# Patient Record
Sex: MALE | Race: WHITE
[De-identification: names, ages, dates, MRNs, and addresses within clinical notes are randomized per-mention and may not be internally consistent; named-entity substitution may affect disease eponyms.]

---

## 2021-12-04 ENCOUNTER — HOSPITAL ENCOUNTER (EMERGENCY)
Dept: HOSPITAL 97 - ER | Age: 31
Discharge: HOME | End: 2021-12-04
Payer: SELF-PAY

## 2021-12-04 VITALS — OXYGEN SATURATION: 100 % | TEMPERATURE: 97.9 F

## 2021-12-04 VITALS — SYSTOLIC BLOOD PRESSURE: 116 MMHG | DIASTOLIC BLOOD PRESSURE: 54 MMHG

## 2021-12-04 DIAGNOSIS — F17.210: ICD-10-CM

## 2021-12-04 DIAGNOSIS — R10.10: Primary | ICD-10-CM

## 2021-12-04 LAB
ALBUMIN SERPL BCP-MCNC: 3.3 G/DL (ref 3.4–5)
ALP SERPL-CCNC: 190 U/L (ref 45–117)
ALT SERPL W P-5'-P-CCNC: 154 U/L (ref 12–78)
AST SERPL W P-5'-P-CCNC: 71 U/L (ref 15–37)
BUN BLD-MCNC: 8 MG/DL (ref 7–18)
GLUCOSE SERPLBLD-MCNC: 101 MG/DL (ref 74–106)
HCT VFR BLD CALC: 43.5 % (ref 39.6–49)
LIPASE SERPL-CCNC: 74 U/L (ref 73–393)
LYMPHOCYTES # SPEC AUTO: 1 K/UL (ref 0.7–4.9)
PMV BLD: 7.9 FL (ref 7.6–11.3)
POTASSIUM SERPL-SCNC: 3.7 MMOL/L (ref 3.5–5.1)
RBC # BLD: 4.84 M/UL (ref 4.33–5.43)

## 2021-12-04 PROCEDURE — 80076 HEPATIC FUNCTION PANEL: CPT

## 2021-12-04 PROCEDURE — 83690 ASSAY OF LIPASE: CPT

## 2021-12-04 PROCEDURE — 96374 THER/PROPH/DIAG INJ IV PUSH: CPT

## 2021-12-04 PROCEDURE — 82565 ASSAY OF CREATININE: CPT

## 2021-12-04 PROCEDURE — 74177 CT ABD & PELVIS W/CONTRAST: CPT

## 2021-12-04 PROCEDURE — 36415 COLL VENOUS BLD VENIPUNCTURE: CPT

## 2021-12-04 PROCEDURE — 99284 EMERGENCY DEPT VISIT MOD MDM: CPT

## 2021-12-04 PROCEDURE — 96375 TX/PRO/DX INJ NEW DRUG ADDON: CPT

## 2021-12-04 PROCEDURE — 85025 COMPLETE CBC W/AUTO DIFF WBC: CPT

## 2021-12-04 PROCEDURE — 80048 BASIC METABOLIC PNL TOTAL CA: CPT

## 2021-12-04 NOTE — RAD REPORT
EXAM DESCRIPTION:  CT - Abdomen   Pelvis W Contrast - 12/4/2021 12:36 pm

 

CLINICAL HISTORY:  Abdominal pain

 

COMPARISON:  none.

 

TECHNIQUE:  Computed axial tomography of the abdomen pelvis was obtained. 100 cc Isovue-300 was admin
istered intravenously. Oral contrast was not requested which limits evaluation of bowel.

 

All CT scans are performed using dose optimization technique as appropriate and may include automated
 exposure control or mA/KV adjustment according to patient size.

 

FINDINGS:  Mild fatty infiltration of the liver

 

Spleen, pancreas, adrenal and kidneys appear unremarkable.

 

There is no evidence of diverticulitis. Normal appendix

 

7 millimeter sclerosis right femoral neck. 14 millimeter sclerosis left acetabulum.

 

Bladder is distended

 

IMPRESSION:  Sclerosis right femoral neck and left acetabulum is nonspecific. These may represent bon
e islands. Blastic metastases can also have this appearance. This should be correlated clinically. Fo
llowup x-ray in 3 months recommended to assess stability.

 

Bladder distention
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
## 2021-12-04 NOTE — EDPHYS
Physician Documentation                                                                           

 [facility]                                                                 

Name: Shawn Rocha                                                                               

Age: 31 yrs                                                                                       

Sex: Male                                                                                         

: 1990                                                                                   

MRN: [id number]                                                                                   

Arrival Date: 2021                                                                          

Time: 11:33                                                                                       

Account#: [id number]                                                                            

Bed 14                                                                                            

Private MD:                                                                                       

ED Physician Boo Salinas                                                                         

HPI:                                                                                              

                                                                                             

17:09 This 31 yrs old Male presents to ER via Ambulatory with complaints of Swollen Glands,   kb  

      Abdominal Pain.                                                                             

17:09 The patient presents with abdominal pain. Onset: The symptoms/episode began/occurred 2  kb  

      day(s) ago. The symptoms do not radiate. Associated signs and symptoms: none. The           

      symptoms are described as constant. Modifying factors: The symptoms are alleviated by       

      nothing, the symptoms are aggravated by pressure. Severity of pain: At its worst the        

      pain was moderate in the emergency department the pain is unchanged. The patient has        

      not experienced similar symptoms in the past. The patient has not recently seen a           

      physician. Pt reports LUQ pain that started 2 days ago. Concerned about abscess because     

      he had one on the right side that felt similar.                                             

                                                                                                  

Historical:                                                                                       

- Allergies:                                                                                      

11:43 No Known Allergies;                                                                     vg1 

- Home Meds:                                                                                      

11:43 None [Active];                                                                          vg1 

- PMHx:                                                                                           

11:43 Hep C;                                                                                  vg1 

- PSHx:                                                                                           

11:43 Abscess Drain from RUQ;                                                                 vg1 

                                                                                                  

- Immunization history:: Client reports receiving the 2nd dose of the Covid vaccine.              

- Social history:: Smoking status: Patient reports the use of cigarette tobacco                   

  products, smokes one-half pack cigarettes per day.                                              

                                                                                                  

                                                                                                  

ROS:                                                                                              

17:07 Constitutional: Negative for fever, chills, and weight loss.                            kb  

17:07 Abdomen/GI: Positive for abdominal pain, Negative for nausea, vomiting, and diarrhea.       

17:07 All other systems are negative.                                                             

                                                                                                  

Exam:                                                                                             

17:07 Constitutional:  This is a well developed, well nourished patient who is awake, alert,  kb  

      and in no acute distress. Head/Face:  Normocephalic, atraumatic. ENT:  Moist Mucous         

      membranes Cardiovascular:  Regular rate and rhythm with a normal S1 and S2.  No             

      gallops, murmurs, or rubs.  No pulse deficits. Respiratory:  Respirations even and          

      unlabored. No increased work of breathing, no retractions or nasal flaring. Skin:           

      Warm, dry with normal turgor.  Normal color. MS/ Extremity:  Pulses equal, no cyanosis.     

       Neurovascular intact.  Full, normal range of motion. Neuro:  Awake and alert, GCS 15,      

      oriented to person, place, time, and situation. Moves all extremities. Normal gait.         

      Psych:  Awake, alert, with orientation to person, place and time.  Behavior, mood, and      

      affect are within normal limits.                                                            

17:07 Abdomen/GI: Inspection: abdomen appears normal, Bowel sounds: normal, Palpation: soft,      

      in all quadrants, moderate abdominal tenderness, in the left upper quadrant.                

                                                                                                  

Vital Signs:                                                                                      

11:41  / 98; Pulse 89; Resp 16; Temp 97.9; Pulse Ox 100% ; Weight 62.6 kg; Height 5 ft. vg1 

      9 in. (175.26 cm); Pain 6/10;                                                               

12:43  / 54; Pulse 77; Resp 18; Pulse Ox 100% ; Pain 8/10;                              jh6 

11:41 Body Mass Index 20.38 (62.60 kg, 175.26 cm)                                             vg1 

                                                                                                  

MDM:                                                                                              

11:47 Patient medically screened.                                                             kb  

17:07 Data reviewed: vital signs, nurses notes. Data interpreted: Pulse oximetry: on room air kb  

      is 100 %. Interpretation: normal. Counseling: I had a detailed discussion with the          

      patient and/or guardian regarding: the historical points, exam findings, and any            

      diagnostic results supporting the discharge/admit diagnosis, lab results, radiology         

      results, the need for outpatient follow up, a family practitioner, to return to the         

      emergency department if symptoms worsen or persist or if there are any questions or         

      concerns that arise at home. ED course: Discussed incidental finding of sclerosis and       

      pt given copy of report. Pt educated to follow up with PCP for further evaluation.          

                                                                                                  

                                                                                             

12:00 Order name: Basic Metabolic Panel                                                       kb  

                                                                                             

12:00 Order name: CBC with Diff                                                               kb  

                                                                                             

12:00 Order name: Hepatic Function                                                            kb  

                                                                                             

12:00 Order name: Lipase                                                                      kb  

                                                                                             

12:00 Order name: Basic Metabolic Panel; Complete Time: 13:13                                 EDMS

                                                                                             

12:01 Order name: CBC with Automated Diff; Complete Time: 12:37                               EDMS

                                                                                             

12:00 Order name: IV Saline Lock; Complete Time: 12:23                                        kb  

                                                                                             

12:00 Order name: Labs collected and sent; Complete Time: 12:23                               kb  

                                                                                             

12:00 Order name: CT Abd/Pelvis - IV Contrast Only; Complete Time: 12:54                      kb  

                                                                                             

12:01 Order name: Liver (Hepatic) Function; Complete Time: 13:13                              EDMS

                                                                                             

12:01 Order name: Lipase; Complete Time: 13:13                                                EDMS

                                                                                                  

Administered Medications:                                                                         

13:50 CANCELLED (Patient Refused): morphine 4 mg IVP once; RASS on ADMIN: Combtv4, Very       kb  

      Agttd3, Agttd2, Rstlss1, AlertClm0, Drwsy-1, Lt Sdtn-2, Mod Sdtn-3, Dp Sdtn-4,              

      UnArsble-5                                                                                  

14:00 Drug: Zofran (Ondansetron) 4 mg Route: IVP; Site: right antecubital;                    [facility] 

14:10 Follow up: Response: No adverse reaction                                                [facility] 

14:00 Drug: Ketorolac 15 mg Route: IVP; Site: right antecubital;                              [facility] 

14:10 Follow up: Response: No adverse reaction                                                [facility] 

                                                                                                  

                                                                                                  

Disposition:                                                                                      

17:59 Co-signature as Attending Physician, Boo Salinas MD I agree with the assessment and     rn  

      plan of care. Attestation: The patient's history, exam findings, diagnostics, and a         

      summary of any interventions or procedures was reviewed in detail with Isabel MARTINEZ.                                                                                      

                                                                                                  

Disposition Summary:                                                                              

21 13:49                                                                                    

Discharge Ordered                                                                                 

      Location: Home                                                                          kb  

      Condition: Stable                                                                       kb  

      Diagnosis                                                                                   

        - Upper abdominal pain, unspecified                                                   kb  

      Followup:                                                                               kb  

        - With: Emergency Department                                                               

        - When: As needed                                                                          

        - Reason: Worsening of condition                                                           

      Followup:                                                                               kb  

        - With: Private Physician                                                                  

        - When: 2 - 3 days                                                                         

        - Reason: Recheck today's complaints, Continuance of care, Re-evaluation by your           

      physician                                                                                   

      Discharge Instructions:                                                                     

        - Discharge Summary Sheet                                                             kb  

        - Abdominal Pain, Adult, Easy-to-Read                                                 kb  

      Forms:                                                                                      

        - Medication Reconciliation Form                                                      kb  

        - Thank You Letter                                                                    kb  

        - Antibiotic Education                                                                kb  

        - Prescription Opioid Use                                                             kb  

      Prescriptions:                                                                              

        - Diclofenac Sodium 75 mg Oral tablet,delayed release (DR/EC)                              

            - take 1 tablet by ORAL route 2 times per day As needed; 30 tablet; Refills: 0,   kb  

      Product Selection Permitted                                                                 

Signatures:                                                                                       

Dispatcher [employer]                           Isabel Hernandez FNP-C FNP-Boo Hemphill MD MD rn Garcia, Victoria, RN RN   [facility]                                                  

Courtney Murrieta RN                   RN   jh6                                                  

                                                                                                  

Corrections: (The following items were deleted from the chart)                                    

13:50 13:33 morphine 4 mg IVP once; RASS on ADMIN: Combtv4, Very Agttd3, Agttd2, Rstlss1,     kb  

      AlertClm0, Drwsy-1, Lt Sdtn-2, Mod Sdtn-3, Dp Sdtn-4, UnArsble-5 ordered. kb                

                                                                                                  

**************************************************************************************************

## 2022-01-01 ENCOUNTER — HOSPITAL ENCOUNTER (EMERGENCY)
Dept: HOSPITAL 97 - ER | Age: 32
Discharge: LEFT BEFORE DISCHARGE | End: 2022-01-01
Payer: SELF-PAY

## 2022-01-01 DIAGNOSIS — Z02.89: Primary | ICD-10-CM

## 2022-01-01 NOTE — ER
Nurse's Notes                                                                                     

 [facility]                                                                 

Name: Shawn Rocha                                                                               

Age: 31 yrs                                                                                       

Sex: Male                                                                                         

: 1990                                                                                   

MRN: [id number]                                                                                   

Arrival Date: 2022                                                                          

Time: 03:05                                                                                       

Account#: [id number]                                                                            

Bed Waiting                                                                                       

Private MD:                                                                                       

Diagnosis:                                                                                        

                                                                                                  

ED Course:                                                                                        

                                                                                             

03:05 Patient arrived in ED.                                                                    

04:56 Patient's name was called from ER lobby. No response. Unable to locate patient. Will    bb  

      disposition as left without being seen by a provider.                                       

                                                                                                  

Administered Medications:                                                                         

No medications were administered                                                                  

                                                                                                  

                                                                                                  

Outcome:                                                                                          

04:56 Patient left the ED.                                                                    bb  

                                                                                                  

Signatures:                                                                                       

Kim Bajwa RN                     RN   bb                                                   

Saray Perez                                                                                    

                                                                                                  

**************************************************************************************************

## 2022-01-01 NOTE — XMS REPORT
Continuity of Care Document

                           Created on:2022



Patient:SUSY ALFARO

Sex:Male

:1990

External Reference #:[id number]





Demographics







                          Address                   [address]



                                                    [place] [address]

 

                          Home Phone                [phone]

 

                          Mobile Phone              [phone]

 

                          Email Address             [email or web address]

 

                          Preferred Language        en

 

                          Marital Status            Unknown

 

                          Druze Affiliation     Unknown

 

                          Race                      Unknown

 

                          Additional Race(s)        White

 

                          Ethnic Group              Not  or 









Author







                          Organization              [facility]

t

 

                          Address                   [address]



                                                    [place] [address]

 

                          Phone                     [phone]









Support







                Name            Relationship    Address         Phone

 

                Naveen           Sibling         Unavailable     [phone]









Care Team Providers







                    Name                Role                Phone

 

                    Pcp,  Does Not Have A Primary Care Physician [phone]

 

                    Therapy,  Covid Infusion Attending Clinician Unavailable

 

                    Stan ARNDT,  AYESHA        Attending Clinician [phone]

 

                    AYESHA PIERCE           Attending Clinician Unavailable









Problems

This patient has no known problems.



Allergies, Adverse Reactions, Alerts







       Allergy Allergy Status Severity Reaction(s) Onset  Inactive Treating Comm

ents 

Source



       Name   Type                        Date   Date   Clinician        

 

       NO KNOWN Drug   Active                                           [facility]



       ALLERGIE Class                                                   ity of



       S                                                              [facility]







Social History







           Social Habit Start Date Stop Date  Quantity   Comments   Source

 

           Sex Assigned At 1990                       [facility]



           Birth      00:00:00   00:00:00                         [facility]









                Smoking Status  Start Date      Stop Date       Source

 

                Unknown if ever smoked                                 [facility]







Medications







       Ordered Filled Start  Stop   Current Ordering Indication Dosage Frequency

 Signature

                    Comments            Components          Source



     Medication Medication Date Date Medication? Clinician                (SIG) 

          



     Name Name                                                   

 

     casirivimab      2021- No        [id number] 1200mg      1,200 mg,    

       [facility]



     -imdevimab                                Subcutaneo           it

y of



     (REGEN-COV      23:45: 22:33                          us, ONCE,           T

exas



     (EUA))      00   :00                           1 dose, On           Medical



     injection                                         [facility]



     (CO-FORMULA                                         21           



     TION) 1,200                                         at 1745,           



     mg                                           Routine           







Vital Signs







             Vital Name   Observation Time Observation Value Comments     Source

 

             Diastolic blood 2021 23:18:00 77 mm[Hg]                 Unive

rsity of



             pressure                                            [facility]

 

             Heart rate   2021 23:18:00 89 /min                   [facility]

 

             Body temperature 2021 23:18:00 37.22 Roxana                 [facility]

 

             Respiratory rate 2021 23:18:00 20 /min                   [facility]

 

             Oxygen saturation in 2021 23:18:00 99 /min                   

[facility]



             Arterial blood by                                        [facility]

ped[facility]



             Pulse oximetry                                        [place]

 

             Systolic blood 2021 23:18:00 131 mm[Hg]                Univer

sity of



             pressure                                            [facility]

 

             Body height  2021 22:30:00 170.2 cm                  [facility]

 

             Body weight  2021 22:30:00 61.689 kg                 [facility]

 

             BMI          2021 22:30:00 21.30 kg/m2               [facility]







Procedures

This patient has no known procedures.



Encounters







        Start   End     Encounter Admission Attending Care    Care    Encounter 

Source



        Date/Time Date/Time Type    Type    Clinicians Facility Department ID   

   

 

        2021 Nurse           Therapy, Adc Covid Infusion [facility]  

  [phone] 

[id number]                                [facility]



        16:00:00 17:00:00 Visit           Armand Pierce [id number]   

      cem [facility] [id number]         [facility]

s



                                                SURGICAL [id number]         Med

ical



                                                CENTER  [address]

 

        2021 Outpatient NORMA PIERCE  [facility]    [id number]

087 [facility]



        16:00:00 16:00:00                 ARMAND priest [facility]







Results

This patient has no known results.

## 2022-01-02 ENCOUNTER — HOSPITAL ENCOUNTER (EMERGENCY)
Dept: HOSPITAL 97 - ER | Age: 32
Discharge: HOME | End: 2022-01-02
Payer: SELF-PAY

## 2022-01-02 VITALS — DIASTOLIC BLOOD PRESSURE: 80 MMHG | TEMPERATURE: 98.3 F | OXYGEN SATURATION: 100 % | SYSTOLIC BLOOD PRESSURE: 138 MMHG

## 2022-01-02 DIAGNOSIS — R10.9: Primary | ICD-10-CM

## 2022-01-02 DIAGNOSIS — R59.0: ICD-10-CM

## 2022-01-02 LAB
ALBUMIN SERPL BCP-MCNC: 2.6 G/DL (ref 3.4–5)
ALP SERPL-CCNC: 415 U/L (ref 45–117)
ALT SERPL W P-5'-P-CCNC: 138 U/L (ref 12–78)
AST SERPL W P-5'-P-CCNC: 102 U/L (ref 15–37)
BUN BLD-MCNC: 11 MG/DL (ref 7–18)
GLUCOSE SERPLBLD-MCNC: 91 MG/DL (ref 74–106)
HCT VFR BLD CALC: 36.5 % (ref 39.6–49)
LIPASE SERPL-CCNC: 135 U/L (ref 73–393)
LYMPHOCYTES # SPEC AUTO: 0.7 K/UL (ref 0.7–4.9)
PMV BLD: 7.7 FL (ref 7.6–11.3)
POTASSIUM SERPL-SCNC: 3.8 MMOL/L (ref 3.5–5.1)
RBC # BLD: 4 M/UL (ref 4.33–5.43)

## 2022-01-02 PROCEDURE — 99284 EMERGENCY DEPT VISIT MOD MDM: CPT

## 2022-01-02 PROCEDURE — 70491 CT SOFT TISSUE NECK W/DYE: CPT

## 2022-01-02 PROCEDURE — 80048 BASIC METABOLIC PNL TOTAL CA: CPT

## 2022-01-02 PROCEDURE — 80076 HEPATIC FUNCTION PANEL: CPT

## 2022-01-02 PROCEDURE — 85025 COMPLETE CBC W/AUTO DIFF WBC: CPT

## 2022-01-02 PROCEDURE — 96375 TX/PRO/DX INJ NEW DRUG ADDON: CPT

## 2022-01-02 PROCEDURE — 36415 COLL VENOUS BLD VENIPUNCTURE: CPT

## 2022-01-02 PROCEDURE — 83690 ASSAY OF LIPASE: CPT

## 2022-01-02 PROCEDURE — 96374 THER/PROPH/DIAG INJ IV PUSH: CPT

## 2022-01-02 PROCEDURE — 74177 CT ABD & PELVIS W/CONTRAST: CPT

## 2022-01-02 NOTE — EDPHYS
Physician Documentation                                                                           

 [facility]                                                                 

Name: Shawn Rocha                                                                               

Age: 31 yrs                                                                                       

Sex: Male                                                                                         

: 1990                                                                                   

MRN: [id number]                                                                                   

Arrival Date: 2022                                                                          

Time: 12:15                                                                                       

Account#: [id number]                                                                            

Bed DIS4                                                                                          

Private MD:                                                                                       

ED Physician Jose Manuel Camacho                                                                       

HPI:                                                                                              

                                                                                             

13:15 This 31 yrs old Male presents to ER via Ambulatory with complaints of Throat Swelling.  jmm 

13:15 The patient presents with abdominal pain. Onset: The symptoms/episode began/occurred.   jmm 

      The symptoms do not radiate. Associated signs and symptoms: Pertinent positives:            

      nausea. The symptoms are described as achy. Modifying factors: The symptoms are             

      alleviated by nothing, the symptoms are aggravated by nothing. The patient has              

      experienced similar episodes in the past, chronically.                                      

                                                                                                  

Historical:                                                                                       

- Allergies:                                                                                      

12:50 No Known Allergies;                                                                     iw  

- Home Meds:                                                                                      

12:50 None [Active];                                                                          iw  

- PMHx:                                                                                           

12:50 HEP C;                                                                                  iw  

14:31 Drug abuse;                                                                             iw  

- PSHx:                                                                                           

12:50 Abscess Drain from RUQ;                                                                 iw  

                                                                                                  

- Immunization history:: Client reports receiving the 2nd dose of the Covid vaccine.              

- Social history:: Smoking status: Patient reports the use of cigarette tobacco                   

  products.                                                                                       

                                                                                                  

                                                                                                  

ROS:                                                                                              

13:15 Constitutional: Negative for fever, chills, and weight loss, Eyes: Negative for injury, jmm 

      pain, redness, and discharge, ENT: Negative for injury, pain, and discharge, Neck:          

      Negative for injury, pain, and swelling, Cardiovascular: Negative for chest pain,           

      palpitations, and edema, Respiratory: Negative for shortness of breath, cough,              

      wheezing, and pleuritic chest pain.                                                         

13:15 Skin: Negative for injury, rash, and discoloration, Neuro: Negative for headache,           

      weakness, numbness, tingling, and seizure, Psych: Negative for depression, anxiety,         

      suicide ideation, homicidal ideation, and hallucinations, Allergy/Immunology: Negative      

      for hives, rash, and allergies.                                                             

13:15 Abdomen/GI: Positive for abdominal pain, nausea.                                            

13:15 All other systems are negative.                                                             

                                                                                                  

Exam:                                                                                             

13:15 Constitutional:  This is a well developed, well nourished patient who is awake, alert,  jmm 

      and in no acute distress. Head/Face:  atraumatic. Eyes:  EOMI, no conjunctival erythema     

      appreciated ENT:  Moist Mucus Membranes Neck:  Trachea midline, Supple Chest/axilla:        

      Normal chest wall appearance and motion.   Cardiovascular:  Regular rate and rhythm.        

      No edema appreciated Respiratory:  Normal respirations, no respiratory distress             

      appreciated                                                                                 

13:15 Back:  Normal ROM Skin:  General appearance color normal MS/ Extremity:  Moves all          

      extremities, no obvious deformities appreciated, no edema noted to the lower                

      extremities  Neuro:  Awake and alert, normal gait Psych:  Behavior is normal, Mood is       

      normal, Patient is cooperative and pleasant                                                 

13:15 Abdomen/GI: Inspection: abdomen appears normal, Bowel sounds: normal, Palpation: soft,      

      mild abdominal tenderness, in all quadrants.                                                

                                                                                                  

Vital Signs:                                                                                      

12:48  / 80; Pulse 100; Resp 16; Temp 98.3; Pulse Ox 100% on R/A; Weight 62.14 kg;      iw  

      Height 5 ft. 8 in. (172.72 cm);                                                             

12:48 Body Mass Index 20.83 (62.14 kg, 172.72 cm)                                             iw  

                                                                                                  

MDM:                                                                                              

13:22 Patient medically screened.                                                             petr 

14:38 Data reviewed: vital signs, nurses notes. Counseling: I had a detailed discussion with  petr 

      the patient and/or guardian regarding: the historical points, exam findings, and any        

      diagnostic results supporting the discharge/admit diagnosis, lab results, radiology         

      results, the need for outpatient follow up, to return to the emergency department if        

      symptoms worsen or persist or if there are any questions or concerns that arise at          

      home. ED course: Is alert and nontoxic in appearance in the ED. Pain is decreased in        

      the ED. Patient advised to follow gastroenterology for further evaluation. Lymph nodes      

      could be secondary to immunocompromise state. Patient is otherwise given strict return      

      precautions. Patient understood and agrees plan of care..                                   

                                                                                                  

                                                                                             

13:15 Order name: Basic Metabolic Panel; Complete Time: 13:48                                 [facility] 

                                                                                             

13:15 Order name: CBC with Diff; Complete Time: 13:48                                         [facility] 

                                                                                             

13:15 Order name: Hepatic Function; Complete Time: 13:48                                      [facility] 

                                                                                             

13:15 Order name: Lipase; Complete Time: 13:48                                                [facility] 

                                                                                             

13:15 Order name: CT Soft Tissue Neck W/contr; Complete Time: 14:32                           [facility] 

                                                                                             

13:15 Order name: CT Abd/Pelvis - IV Contrast Only; Complete Time: 14:32                      [facility] 

                                                                                             

13:15 Order name: IV Saline Lock; Complete Time: 13:24                                        [facility] 

                                                                                             

13:15 Order name: Labs collected and sent; Complete Time: 13:24                               [facility] 

                                                                                                  

Administered Medications:                                                                         

14:26 Drug: morphine 4 mg Route: IVP; Site: left forearm;                                     iw  

14:50 Follow up: Response: No adverse reaction                                                iw  

14:26 Drug: Zofran (Ondansetron) 4 mg Route: IVP; Site: left forearm;                         iw  

14:50 Follow up: Response: No adverse reaction                                                iw  

                                                                                                  

                                                                                                  

Disposition:                                                                                      

18:53 Co-signature as Attending Physician, Jose Manuel Camacho MD I agree with the assessment and   kdr 

      plan of care.                                                                               

                                                                                                  

Disposition Summary:                                                                              

22 14:39                                                                                    

Discharge Ordered                                                                                 

      Location: Home                                                                          [facility] 

      Condition: Stable                                                                       [facility] 

      Diagnosis                                                                                   

        - Abdominal pain, unspecified                                                         [facility] 

        - Localized enlarged lymph nodes                                                      [facility] 

      Followup:                                                                               [facility] 

        - With: Lake Combs MD                                                                    

        - When: 2 - 3 days                                                                         

        - Reason: Recheck today's complaints, Continuance of care, Re-evaluation by your           

      physician                                                                                   

      Discharge Instructions:                                                                     

        - Discharge Summary Sheet                                                             [facility] 

        - Abdominal Pain, Adult                                                               jm 

        - Lymphadenopathy                                                                     [facility] 

      Forms:                                                                                      

        - Medication Reconciliation Form                                                      [facility] 

        - Thank You Letter                                                                    [facility] 

        - Antibiotic Education                                                                [facility] 

        - Prescription Opioid Use                                                             [facility] 

      Prescriptions:                                                                              

        - ondansetron 4 mg Oral tablet,disintegrating                                              

            - place 1 tablet by TRANSLINGUAL route every 4-6 hours; 20 tablet; Refills: 0,    [facility] 

      Product Selection Permitted                                                                 

        - Pepcid 20 mg Oral Tablet                                                                 

            - take 1 tablet by ORAL route every 12 hours for 10 days; 20 tablet; Refills: 0,  [facility] 

      Product Selection Permitted                                                                 

        - Augmentin 875-125 mg Oral Tablet                                                         

            - take 1 tablet by ORAL route every 12 hours for 10 days; 20 tablet; Refills: 0,  [facility] 

      Product Selection Permitted                                                                 

        - dicyclomine 20 mg Oral Tablet                                                            

            - take 1 tablet by ORAL route 4 times per day; 30 tablet; Refills: 0, Product     [facility] 

      Selection Permitted                                                                         

Signatures:                                                                                       

Dispatcher [employer]                           Jose Manuel Stafford MD MD kdr Mickail, Joel, PA PA   [facility]                                                  

Alysha Patterson RN                     RN   iw                                                   

                                                                                                  

**************************************************************************************************

## 2022-01-02 NOTE — RAD REPORT
EXAM DESCRIPTION:  CT - Soft Tissue Neck W/Contr

 

CLINICAL HISTORY:  thorat swelling

 

COMPARISON:  No comparisonsAbdomen   Pelvis W Contrast dated 1/2/2022

 

TECHNIQUE  All CT scans are performed using dose optimization technique as appropriate and may includ
e automated exposure control or mA/KV adjustment according to patient size.

 

FINDINGS:  Bilateral enlarged submental lymph nodes. Enlarged cervical chain lymph nodes noted as wel
l. Example, there is a left level 2 cervical chain lymph node measuring 14 millimeters. A left submen
harmeet lymph node measures 15 millimeters. A right lower cervical chain/supraclavicular lymph node measu
res 15 millimeters.

 

Nasopharyngeal tissues are normal in appearance. Fossa Rosenmller are normal.

 

Parapharyngeal fat triangles are symmetric. Tongue base structures are normal.

 

Epiglottis and aryepiglottic folds are normal. Piriform sinuses are well aerated.

 

The vocal cords are normal in appearance.

 

Salivary glands are normal in appearance.

 

Upper lung fields are clear. Included intracranial contents are unremarkable.

 

 

IMPRESSION:  Bilateral cervical chain lymphadenopathy which may refer either infection or inflammatio
n versus a lymphoproliferative process.

## 2022-01-02 NOTE — ER
Nurse's Notes                                                                                     

 [facility]                                                                 

Name: Shawn Rocha                                                                               

Age: 31 yrs                                                                                       

Sex: Male                                                                                         

: 1990                                                                                   

MRN: [id number]                                                                                   

Arrival Date: 2022                                                                          

Time: 12:15                                                                                       

Account#: [id number]                                                                            

Bed DIS4                                                                                          

Private MD:                                                                                       

Diagnosis: Abdominal pain, unspecified;Localized enlarged lymph nodes                             

                                                                                                  

Presentation:                                                                                     

                                                                                             

12:48 Chief complaint: Patient states: swelling to left side of neck started a week ago, is   iw  

      having chills, pain with swallowing, getting bigger , has been septic in past from and      

      abscess under his ribs. Coronavirus screen: At this time, the client does not indicate      

      any symptoms associated with coronavirus-19. Ebola Screen: Patient negative for fever       

      greater than or equal to 101.5 degrees Fahrenheit, and additional compatible Ebola          

      Virus Disease symptoms Patient denies exposure to infectious person. Patient denies         

      travel to an Ebola-affected area in the 21 days before illness onset. No symptoms or        

      risks identified at this time. Initial Sepsis Screen: Does the patient meet any 2           

      criteria? HR > 90 bpm. Does the patient have a suspected source of infection?. Risk         

      Assessment: Do you want to hurt yourself or someone else? Patient reports no desire to      

      harm self or others. Onset of symptoms was 2021.                               

12:48 Method Of Arrival: Ambulatory                                                           iw  

12:48 Acuity: TODD 3                                                                           iw  

                                                                                                  

Historical:                                                                                       

- Allergies:                                                                                      

12:50 No Known Allergies;                                                                     iw  

- Home Meds:                                                                                      

12:50 None [Active];                                                                          iw  

- PMHx:                                                                                           

12:50 HEP C;                                                                                  iw  

14:31 Drug abuse;                                                                             iw  

- PSHx:                                                                                           

12:50 Abscess Drain from RUQ;                                                                 iw  

                                                                                                  

- Immunization history:: Client reports receiving the 2nd dose of the Covid vaccine.              

- Social history:: Smoking status: Patient reports the use of cigarette tobacco                   

  products.                                                                                       

                                                                                                  

                                                                                                  

Screenin:42 Abuse screen: Denies threats or abuse. Denies injuries from another. Nutritional        iw  

      screening: No deficits noted. Tuberculosis screening: No symptoms or risk factors           

      identified. Fall Risk IV access (20 points).                                                

                                                                                                  

Assessment:                                                                                       

13:41 General: Appears in no apparent distress. Behavior is calm, cooperative. Pain:          iw  

      Complains of pain in neck. Neuro: Level of Consciousness is awake, alert, obeys             

      commands, Oriented to person, place, time, situation, Moves all extremities. Full           

      function. Cardiovascular: Patient's skin is warm and dry. Respiratory: Respiratory          

      effort is even, unlabored, Respiratory pattern is regular, symmetrical. Derm: Skin is       

      intact, is healthy with good turgor.                                                        

                                                                                                  

Vital Signs:                                                                                      

12:48  / 80; Pulse 100; Resp 16; Temp 98.3; Pulse Ox 100% on R/A; Weight 62.14 kg;      iw  

      Height 5 ft. 8 in. (172.72 cm);                                                             

12:48 Body Mass Index 20.83 (62.14 kg, 172.72 cm)                                             iw  

                                                                                                  

ED Course:                                                                                        

12:15 Patient arrived in ED.                                                                  mr  

12:28 Francisco He PA is PHCP.                                                              [facility] 

12:28 Jose Manuel Camacho MD is Attending Physician.                                              [facility] 

12:50 Triage completed.                                                                       iw  

12:50 Arm band placed on.                                                                     iw  

13:25 Initial lab(s) drawn, by me, sent to lab. Inserted saline lock: 20 gauge in left        kj1 

      forearm, using aseptic technique. Blood collected.                                          

13:41 Alysha Patterson, RN is Primary Nurse.                                                   iw  

13:41 Patient has correct armband on for positive identification.                             iw  

14:12 CT Soft Tissue Neck W/contr In Process Unspecified.                                     EDMS

14:12 CT Abd/Pelvis - IV Contrast Only In Process Unspecified.                                EDMS

14:38 Lake Combs MD is Referral Physician.                                                  [facility] 

15:08 No provider procedures requiring assistance completed. Patient did not have IV access   iw  

      during this emergency room visit.                                                           

                                                                                                  

Administered Medications:                                                                         

14:26 Drug: morphine 4 mg Route: IVP; Site: left forearm;                                     iw  

14:50 Follow up: Response: No adverse reaction                                                iw  

14:26 Drug: Zofran (Ondansetron) 4 mg Route: IVP; Site: left forearm;                         iw  

14:50 Follow up: Response: No adverse reaction                                                iw  

                                                                                                  

                                                                                                  

Outcome:                                                                                          

14:39 Discharge ordered by MD.                                                                jmm 

15:08 Discharged to home ambulatory, with family.                                             iw  

15:08 Condition: good                                                                             

15:08 Discharge instructions given to patient, Instructed on discharge instructions, follow       

      up and referral plans. Demonstrated understanding of instructions, follow-up care.          

15:09 Patient left the ED.                                                                    iw  

                                                                                                  

Signatures:                                                                                       

Dispatcher [employer]                           EDMS                                                 

Francisco He PA                       PA   Alia Siu                                 mr                                                   

Alysha Patterson, VALERIA                     RN   iw                                                   

Ema Murillo                              kj1                                                  

                                                                                                  

**************************************************************************************************

## 2022-01-02 NOTE — RAD REPORT
EXAM DESCRIPTION:  CTAbdomen   Pelvis W Contrast - 1/2/2022 2:12 pm

 

CLINICAL HISTORY:

abdominal pain

 

COMPARISON:  Abdomen   Pelvis W Contrast dated 12/4/2021

 

TECHNIQUE:  CT of the abdomen and pelvis was performed.

 

All CT scans are performed using dose optimization technique as appropriate and may include automated
 exposure control or mA/KV adjustment according to patient size.

 

FINDINGS:  Lower chest: Small lower lung pulmonary nodules, the largest measuring 4 millimeters in th
e medial aspect of the right lower lobe. These are most certainly benign.

Liver: No acute abnormality or suspicious lesions.

Biliary: No biliary ductal dilatation.

Stomach: No significant focal abnormality.

Duodenum: No significant focal abnormality.

Pancreas: No significant abnormality.

Spleen: Borderline splenomegaly.

Adrenal: No suspicious lesions.

Kidney/ureter: No hydronephrosis. No renal calculi.

Retroperitoneum: No retroperitoneal adenopathy.

Vascular: No aneurysm.

Bowel: No significant focal abnormality. Normal appendix.

Peritoneum: No ascites or free air.

Bladder: Grossly unremarkable.

Reproductive: No adnexal masses.

Bones: No acute fracture.

Other: n/a

 

IMPRESSION:  No acute intra-abdominal or pelvic finding. Normal appendix.

## 2022-01-03 ENCOUNTER — HOSPITAL ENCOUNTER (EMERGENCY)
Dept: HOSPITAL 97 - ER | Age: 32
Discharge: HOME | End: 2022-01-03
Payer: SELF-PAY

## 2022-01-03 VITALS — OXYGEN SATURATION: 100 % | DIASTOLIC BLOOD PRESSURE: 59 MMHG | SYSTOLIC BLOOD PRESSURE: 109 MMHG

## 2022-01-03 VITALS — TEMPERATURE: 99.3 F

## 2022-01-03 DIAGNOSIS — R07.9: Primary | ICD-10-CM

## 2022-01-03 DIAGNOSIS — F17.210: ICD-10-CM

## 2022-01-03 LAB
ALBUMIN SERPL BCP-MCNC: 2.4 G/DL (ref 3.4–5)
ALP SERPL-CCNC: 446 U/L (ref 45–117)
ALT SERPL W P-5'-P-CCNC: 115 U/L (ref 12–78)
AST SERPL W P-5'-P-CCNC: 89 U/L (ref 15–37)
BUN BLD-MCNC: 11 MG/DL (ref 7–18)
GLUCOSE SERPLBLD-MCNC: 142 MG/DL (ref 74–106)
HCT VFR BLD CALC: 33.8 % (ref 39.6–49)
INR BLD: 1.19
LYMPHOCYTES # SPEC AUTO: 0.4 K/UL (ref 0.7–4.9)
MAGNESIUM SERPL-MCNC: 2 MG/DL (ref 1.8–2.4)
NT-PROBNP SERPL-MCNC: 190 PG/ML (ref ?–125)
PMV BLD: 8.1 FL (ref 7.6–11.3)
POTASSIUM SERPL-SCNC: 3.9 MMOL/L (ref 3.5–5.1)
RBC # BLD: 3.71 M/UL (ref 4.33–5.43)
TROPONIN (EMERG DEPT USE ONLY): < 0.02 NG/ML (ref 0–0.04)

## 2022-01-03 PROCEDURE — 96374 THER/PROPH/DIAG INJ IV PUSH: CPT

## 2022-01-03 PROCEDURE — 80076 HEPATIC FUNCTION PANEL: CPT

## 2022-01-03 PROCEDURE — 85610 PROTHROMBIN TIME: CPT

## 2022-01-03 PROCEDURE — 96361 HYDRATE IV INFUSION ADD-ON: CPT

## 2022-01-03 PROCEDURE — 84484 ASSAY OF TROPONIN QUANT: CPT

## 2022-01-03 PROCEDURE — 83735 ASSAY OF MAGNESIUM: CPT

## 2022-01-03 PROCEDURE — 99284 EMERGENCY DEPT VISIT MOD MDM: CPT

## 2022-01-03 PROCEDURE — 83880 ASSAY OF NATRIURETIC PEPTIDE: CPT

## 2022-01-03 PROCEDURE — 85025 COMPLETE CBC W/AUTO DIFF WBC: CPT

## 2022-01-03 PROCEDURE — 80048 BASIC METABOLIC PNL TOTAL CA: CPT

## 2022-01-03 PROCEDURE — 86308 HETEROPHILE ANTIBODY SCREEN: CPT

## 2022-01-03 PROCEDURE — 71045 X-RAY EXAM CHEST 1 VIEW: CPT

## 2022-01-03 PROCEDURE — 36415 COLL VENOUS BLD VENIPUNCTURE: CPT

## 2022-01-03 PROCEDURE — 93005 ELECTROCARDIOGRAM TRACING: CPT

## 2022-01-03 NOTE — XMS REPORT
Continuity of Care Document

                           Created on:January 3, 2022



Patient:SUSY ALFARO

Sex:Male

:1990

External Reference #:[id number]





Demographics







                          Address                   [address]



                                                    [place] [address]

 

                          Home Phone                [phone]

 

                          Mobile Phone              [phone]

 

                          Email Address             [email or web address]

 

                          Preferred Language        en

 

                          Marital Status            Unknown

 

                          Yazidi Affiliation     Unknown

 

                          Race                      Unknown

 

                          Additional Race(s)        White

 

                          Ethnic Group              Not  or 









Author







                          Organization              [facility]

t

 

                          Address                   [address]



                                                    [place] [address]

 

                          Phone                     [phone]









Support







                Name            Relationship    Address         Phone

 

                Naveen           Sibling         Unavailable     [phone]









Care Team Providers







                    Name                Role                Phone

 

                    Pcp,  Does Not Have A Primary Care Physician [phone]

 

                    Therapy,  Covid Infusion Attending Clinician Unavailable

 

                    Stan ARNDT,  AYESHA        Attending Clinician [phone]

 

                    AYESHA PIERCE           Attending Clinician Unavailable









Problems

This patient has no known problems.



Allergies, Adverse Reactions, Alerts







       Allergy Allergy Status Severity Reaction(s) Onset  Inactive Treating Comm

ents 

Source



       Name   Type                        Date   Date   Clinician        

 

       NO KNOWN Drug   Active                                           [facility]



       ALLERGIE Class                                                   ity of



       S                                                              [facility]







Social History







           Social Habit Start Date Stop Date  Quantity   Comments   Source

 

           Sex Assigned At 1990                       [facility]



           Birth      00:00:00   00:00:00                         [facility]









                Smoking Status  Start Date      Stop Date       Source

 

                Unknown if ever smoked                                 [facility]







Medications







       Ordered Filled Start  Stop   Current Ordering Indication Dosage Frequency

 Signature

                    Comments            Components          Source



     Medication Medication Date Date Medication? Clinician                (SIG) 

          



     Name Name                                                   

 

     casirivimab      2021- No        [id number] 1200mg      1,200 mg,    

       [facility]



     -imdevimab                                Subcutaneo           it

y of



     (REGEN-COV      23:45: 22:33                          us, ONCE,           T

exas



     (EUA))      00   :00                           1 dose, On           Medical



     injection                                         [facility]



     (CO-FORMULA                                         21           



     TION) 1,200                                         at 1745,           



     mg                                           Routine           







Vital Signs







             Vital Name   Observation Time Observation Value Comments     Source

 

             Diastolic blood 2021 23:18:00 77 mm[Hg]                 Un[facility]

rsity of



             pressure                                            [facility]

 

             Heart rate   2021 23:18:00 89 /min                   [facility]

 

             Body temperature 2021 23:18:00 37.22 Roxana                 [facility]

 

             Respiratory rate 2021 23:18:00 20 /min                   [facility]

 

             Oxygen saturation in 2021 23:18:00 99 /min                   

[facility]



             Arterial blood by                                        [facility]

[facility]



             Pulse oximetry                                        [place]

 

             Systolic blood 2021 23:18:00 131 mm[Hg]                Univer

sity of



             pressure                                            [facility]

 

             Body height  2021 22:30:00 170.2 cm                  [facility]

 

             Body weight  2021 22:30:00 61.689 kg                 [facility]

 

             BMI          2021 22:30:00 21.30 kg/m2               [facility]







Procedures

This patient has no known procedures.



Encounters







        Start   End     Encounter Admission Attending Care    Care    Encounter 

Source



        Date/Time Date/Time Type    Type    Clinicians Facility Department ID   

   

 

        2021 Nurse           Therapy, [facility] Cov[facility] Infusion [facility]  

  [phone] 

[id number]                                [facility]



        16:00:00 17:00:00 Visit           Armand Pierce [id number]   

      cem [facility] [id number]         [facility]

s



                                                SURGICAL [phone]         Med

ical



                                                CENTER  [address]             [address]

 

        2021 Outpatient NORMA PIERCE  [facility]    [id number]

087 [facility]



        16:00:00 16:00:00                 ARMAND priest [facility]







Results

This patient has no known results.

## 2022-01-03 NOTE — EDPHYS
Physician Documentation                                                                           

 [facility]                                                                 

Name: Shawn Rocha                                                                               

Age: 31 yrs                                                                                       

Sex: Male                                                                                         

: 1990                                                                                   

MRN: [id number]                                                                                   

Arrival Date: 2022                                                                          

Time: 15:35                                                                                       

Account#: [id number]                                                                            

Bed 10                                                                                            

Private MD:                                                                                       

ED Physician Sunil Peng                                                                      

HPI:                                                                                              

                                                                                             

17:19 This 31 yrs old Male presents to ER via Ambulatory with complaints of Abdominal Pain.   pm1 

17:19 The patient presents with abdominal pain in the upper abdomen. Onset: The               pm1 

      symptoms/episode began/occurred 3 day(s) ago. The symptoms do not radiate. Associated       

      signs and symptoms: Pertinent positives: chest pain, Pertinent negatives: nausea,           

      vomiting, and diarrhea, dysuria, fever, shortness of breath. The symptoms are described     

      as sharp. Modifying factors: The symptoms are alleviated by nothing, the symptoms are       

      aggravated by nothing. Severity of pain: in the emergency department the pain is            

      actually worse. The patient has experienced similar episodes in the past, a few times.      

      The patient has been recently seen at the [facility] Emergency       

      Department, yesterday, for similar complaints labs were performed, CT scan was              

      performed, was given a prescription for antibiotics, was given a prescription for pain      

      medications.                                                                                

                                                                                                  

Historical:                                                                                       

- Allergies:                                                                                      

15:59 No Known Allergies;                                                                     iw  

- Home Meds:                                                                                      

15:59 None [Active];                                                                          iw  

- PMHx:                                                                                           

15:59 HEP C; drug abuse;                                                                      iw  

- PSHx:                                                                                           

15:59 Abscess Drain from RUQ;                                                                 iw  

                                                                                                  

- Immunization history:: Adult Immunizations up to date, Client reports receiving the             

  2nd dose of the Covid vaccine.                                                                  

- Social history:: Smoking status: Patient reports the use of cigarette tobacco                   

  products, smokes one-half pack cigarettes per day.                                              

                                                                                                  

                                                                                                  

ROS:                                                                                              

17:19 Constitutional: Negative for fever, chills, and weight loss.                            pm1 

17:19 Respiratory: Negative for shortness of breath, cough, wheezing, and pleuritic chest         

      pain.                                                                                       

17:19 Back: Negative for injury and pain, MS/Extremity: Negative for injury and deformity,        

      Skin: Negative for injury, rash, and discoloration, Neuro: Negative for headache,           

      weakness, numbness, tingling, and seizure.                                                  

17:19 Cardiovascular: Positive for chest pain, Negative for palpitations.                         

17:19 Abdomen/GI: Positive for abdominal pain, of the right upper quadrant and left upper         

      quadrant, Negative for nausea, vomiting, and diarrhea, constipation.                        

17:19 All other systems are negative.                                                             

                                                                                                  

Exam:                                                                                             

17:19 Constitutional:  This is a well developed, well nourished patient who is awake, alert,  pm1 

      and in no acute distress. Head/Face:  Normocephalic, atraumatic. Chest/axilla:  Normal      

      chest wall appearance and motion.  Nontender with no deformity.  No lesions are             

      appreciated. Cardiovascular:  Regular rate and rhythm with a normal S1 and S2.  No          

      gallops, murmurs, or rubs.  Normal PMI, no JVD.  No pulse deficits. Respiratory:  Lungs     

      have equal breath sounds bilaterally, clear to auscultation and percussion.  No rales,      

      rhonchi or wheezes noted.  No increased work of breathing, no retractions or nasal          

      flaring.                                                                                    

17:19 Back:  No spinal tenderness.  No costovertebral tenderness.  Full range of motion.          

      Skin:  Warm, dry with normal turgor.  Normal color with no rashes, no lesions, and no       

      evidence of cellulitis. MS/ Extremity:  Pulses equal, no cyanosis.  Neurovascular           

      intact.  Full, normal range of motion.                                                      

17:19 Abdomen/GI: Inspection: abdomen appears normal, Palpation: abdomen is soft and              

      non-tender, in all quadrants.                                                               

17:19 Neuro: Exam negative for acute changes, Orientation: is normal, Mentation: is normal,       

      Motor: is normal, moves all fours.                                                          

                                                                                                  

Vital Signs:                                                                                      

16:01  / 69; Pulse 118; Resp 20; Temp 99.3; Pulse Ox 100% ; Weight 62.14 kg; Height 5   iw  

      ft. 8 in. (172.72 cm); Pain 8/10;                                                           

19:54  / 56; Pulse 86; Resp 18; Pulse Ox 99% ; Pain 8/10;                               al4 

20:59  / 59; Pulse 87; Resp 18; Pulse Ox 100% ;                                         al4 

16:01 Body Mass Index 20.83 (62.14 kg, 172.72 cm)                                             iw  

                                                                                                  

MDM:                                                                                              

17:10 ED course: Patient was seen in the ER yesterday with abdominal work up, which included  pm1 

      CT abd/pelvis and labs. Patient no acute findings yesterday, elevated LFTs with history     

      of hepatitis C and drug abuse. Patient reports continued abdominal pain and chest pain.     

      Will repeat blood work and add cardiac evaluation.                                          

17:26 Patient medically screened.                                                             pm1 

20:20 Data reviewed: vital signs.                                                             pm1 

20:33 ED course: Patient was discharged home with Bentyl, Pepcid, and Augmentin which are     pm1 

      appropriate medications for the patient to go home with. Patient with history of drug       

      abuse, therefore I will not prescribe the patient any narcotics.                            

                                                                                                  

                                                                                             

17:07 Order name: Basic Metabolic Panel                                                       pm1 

                                                                                             

17:07 Order name: CBC with Diff                                                               pm1 

                                                                                             

17:07 Order name: LFT's                                                                       pm1 

                                                                                             

17:07 Order name: Magnesium; Complete Time: 18:13                                             pm1 

                                                                                             

17:07 Order name: NT PRO-BNP; Complete Time: 18:13                                            pm1 

                                                                                             

17:07 Order name: PT-INR; Complete Time: 18:13                                                pm1 

                                                                                             

17:07 Order name: Troponin (emerg Dept Use Only); Complete Time: 18:13                        pm1 

                                                                                             

17:07 Order name: XRAY Chest (1 view); Complete Time: 18:13                                   pm1 

                                                                                             

17:07 Order name: Mono Screen Profile; Complete Time: 18:39                                   pm1 

                                                                                             

17:08 Order name: Basic Metabolic Panel; Complete Time: 18:13                                 EDMS

                                                                                             

17:08 Order name: CBC with Automated Diff; Complete Time: 18:13                               EDMS

                                                                                             

17:08 Order name: Liver (Hepatic) Function; Complete Time: 18:13                              EDMS

                                                                                             

17:07 Order name: EKG; Complete Time: 17:08                                                   pm1 

                                                                                             

17:07 Order name: EKG - Nurse/Tech; Complete Time: 20:30                                      pm1 

                                                                                             

17:07 Order name: IV Saline Lock; Complete Time: 18:18                                        pm1 

                                                                                             

17:07 Order name: Labs collected and sent; Complete Time: 18:18                               pm1 

                                                                                             

17:07 Order name: O2 Per Protocol; Complete Time: 20:07                                       pm1 

                                                                                             

17:07 Order name: O2 Sat Monitoring; Complete Time: 20:07                                     pm1 

                                                                                                  

Administered Medications:                                                                         

20:06 Drug: NS 0.9% 1000 ml Route: IV; Rate: 1000 ml; Site: right antecubital;                al4 

21:22 Follow up: Response: No adverse reaction; IV Status: Completed infusion                 al4 

20:06 Drug: Ketorolac 30 mg Route: IVP; Site: right antecubital;                              al4 

21:22 Follow up: Response: No adverse reaction                                                al4 

20:59 Drug: GI Cocktail without Donnatal - (Maalox Suspension 30 ml, Lidocaine Liquid 2 % 15  al4 

      ml) Route: PO;                                                                              

21:22 Follow up: Response: No adverse reaction                                                al4 

                                                                                                  

                                                                                                  

Disposition:                                                                                      

                                                                                             

08:48 Co-signature as Attending Physician, Sunil Peng MD I agree with the assessment and  telma 

      plan of care.                                                                               

                                                                                                  

Disposition Summary:                                                                              

22 20:35                                                                                    

Discharge Ordered                                                                                 

      Location: Home                                                                          pm1 

      Problem: new                                                                            pm1 

      Symptoms: have improved                                                                 pm1 

      Condition: Stable                                                                       pm1 

      Diagnosis                                                                                   

        - Abdominal pain, unspecified                                                         pm1 

        - Chest pain, unspecified                                                             pm1 

      Followup:                                                                               pm1 

        - With: Emergency Department                                                               

        - When: As needed                                                                          

        - Reason: Worsening of condition                                                           

      Followup:                                                                               pm1 

        - With: Private Physician                                                                  

        - When: 2 - 3 days                                                                         

        - Reason: Recheck today's complaints, Continuance of care, Re-evaluation by your           

      physician                                                                                   

      Discharge Instructions:                                                                     

        - Discharge Summary Sheet                                                             pm1 

        - Abdominal Pain, Adult                                                               pm1 

        - Nonspecific Chest Pain, Adult                                                       pm1 

      Forms:                                                                                      

        - Medication Reconciliation Form                                                      pm1 

        - Thank You Letter                                                                    pm1 

        - Antibiotic Education                                                                pm1 

        - Prescription Opioid Use                                                             pm1 

Signatures:                                                                                       

Dispatcher [employer]                           Sunil Bradshaw MD MD cha Williams, Irene, RN RN iw Marinas, Patrick, NP                    NP   pm1                                                  

Kyle Gupta                            al4                                                  

                                                                                                  

**************************************************************************************************

## 2022-01-03 NOTE — RAD REPORT
EXAM DESCRIPTION:  RAD - Chest Single View - 1/3/2022 6:00 pm

 

CLINICAL HISTORY:  CHEST PAIN

Chest pain.

 

COMPARISON:  No comparisons

 

FINDINGS:  Portable technique limits examination quality.

 

Mild bilateral interstitial lung markings are seen suggesting a viral pneumonitis. The heart is joao
l in size. No displaced fractures.
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
## 2022-01-04 NOTE — EKG
Test Date:    2022-01-03               Test Time:    20:30:34

Technician:   ALL                                    

                                                     

MEASUREMENT RESULTS:                                       

Intervals:                                           

Rate:         75                                     

CO:           110                                    

QRSD:         76                                     

QT:           360                                    

QTc:          402                                    

Axis:                                                

P:            64                                     

CO:           110                                    

QRS:          78                                     

T:            -4                                     

                                                     

INTERPRETIVE STATEMENTS:                                       

                                                     

Sinus rhythm with short CO

Septal infarct, age undetermined

T wave abnormality, consider inferior ischemia

Abnormal ECG

No previous ECG available for comparison



Electronically Signed On 01-04-22 11:14:09 CST by Charlie Kat

## 2022-04-15 ENCOUNTER — HOSPITAL ENCOUNTER (EMERGENCY)
Dept: HOSPITAL 97 - ER | Age: 32
Discharge: ANOTHER HOSPITAL | End: 2022-04-15
Payer: SELF-PAY

## 2022-04-15 VITALS — DIASTOLIC BLOOD PRESSURE: 106 MMHG | SYSTOLIC BLOOD PRESSURE: 158 MMHG

## 2022-04-15 VITALS — TEMPERATURE: 98 F

## 2022-04-15 VITALS — OXYGEN SATURATION: 100 %

## 2022-04-15 DIAGNOSIS — F17.210: ICD-10-CM

## 2022-04-15 DIAGNOSIS — S06.4X0A: Primary | ICD-10-CM
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
## 2022-04-15 LAB
ALBUMIN SERPL BCP-MCNC: 4.1 G/DL (ref 3.4–5)
ALP SERPL-CCNC: 103 U/L (ref 45–117)
ALT SERPL W P-5'-P-CCNC: 201 U/L (ref 12–78)
AST SERPL W P-5'-P-CCNC: 100 U/L (ref 15–37)
BUN BLD-MCNC: 15 MG/DL (ref 7–18)
GLUCOSE SERPLBLD-MCNC: 99 MG/DL (ref 74–106)
HCT VFR BLD CALC: 45.5 % (ref 39.6–49)
INR BLD: 1.04
LYMPHOCYTES # SPEC AUTO: 2.7 K/UL (ref 0.7–4.9)
PMV BLD: 8 FL (ref 7.6–11.3)
POTASSIUM SERPL-SCNC: 3.8 MMOL/L (ref 3.5–5.1)
RBC # BLD: 5.07 M/UL (ref 4.33–5.43)

## 2022-04-15 PROCEDURE — 99285 EMERGENCY DEPT VISIT HI MDM: CPT

## 2022-04-15 PROCEDURE — 70450 CT HEAD/BRAIN W/O DYE: CPT

## 2022-04-15 PROCEDURE — 85610 PROTHROMBIN TIME: CPT

## 2022-04-15 PROCEDURE — 96367 TX/PROPH/DG ADDL SEQ IV INF: CPT

## 2022-04-15 PROCEDURE — 72125 CT NECK SPINE W/O DYE: CPT

## 2022-04-15 PROCEDURE — 80053 COMPREHEN METABOLIC PANEL: CPT

## 2022-04-15 PROCEDURE — 36415 COLL VENOUS BLD VENIPUNCTURE: CPT

## 2022-04-15 PROCEDURE — 93005 ELECTROCARDIOGRAM TRACING: CPT

## 2022-04-15 PROCEDURE — 96375 TX/PRO/DX INJ NEW DRUG ADDON: CPT

## 2022-04-15 PROCEDURE — 76377 3D RENDER W/INTRP POSTPROCES: CPT

## 2022-04-15 PROCEDURE — 85025 COMPLETE CBC W/AUTO DIFF WBC: CPT

## 2022-04-15 PROCEDURE — 70486 CT MAXILLOFACIAL W/O DYE: CPT

## 2022-04-15 PROCEDURE — 96365 THER/PROPH/DIAG IV INF INIT: CPT
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
## 2022-04-15 NOTE — XMS REPORT
Continuity of Care Document

                            Created on:April 15, 2022



Patient:SUSY ALFARO

Sex:Male

:1990

External Reference #:[id number]





Demographics







                          Address                   [address]



                                                    [place] [address]

 

                          Home Phone                [phone]

 

                          Mobile Phone              [phone]

 

                          Email Address             [address]

 

                          Preferred Language        en

 

                          Marital Status            Unknown

 

                          Denominational Affiliation     Unknown

 

                          Race                      Unknown

 

                          Additional Race(s)        White

 

                          Ethnic Group              Not  or 









Author







                          Organization              [facility]

t

 

                          Address                   [address]



                                                    [place] [address]

 

                          Phone                     [phone]









Support







                Name            Relationship    Address         Phone

 

                Naveen           Sibling         Unavailable     [phone]









Care Team Providers







                    Name                Role                Phone

 

                    Pcp,  Does Not Have A Primary Care Physician [phone]

 

                    Jelani MORAN           Attending Clinician [phone]

 

                    STEFANIE RAMIREZ       Attending Clinician Unavailable

 

                    Sadaf MOYER,  B    Attending Clinician [phone]

 

                    Dalton ARNDT,  C   Attending Clinician [phone]

 

                    Stefanie Ramirez MD    Attending Clinician [phone]

 

                    Kate Wharton MD   Attending Clinician [phone]

 

                    Therapy,  Covid Infusion Attending Clinician Unavailable

 

                    Stan ARNDT,  AYESHA        Attending Clinician [phone]

 

                    AYESHA PIERCE           Attending Clinician Unavailable

 

                    Doctor Unassigned,  Name Attending Clinician Unavailable

 

                    KATE WHARTON      Admitting Clinician Unavailable

 

                    Kate Wharton MD   Admitting Clinician [phone]









Problems







       Condition Condition Condition Status Onset  Resolution Last   Treating Co

mments 

Source



       Name   Details Category        Date   Date   Treatment Clinician        



                                                 Date                 

 

       Lip lesion Lip lesion Disease Active                              U

nivers



                                   1-11                               ity of



                                   00:00:                             [address]

 

       Syphilis, Syphilis, Disease Active                              Uni

vers



       secondary secondary               -07                               ity 

of



                                   00:00:                             [address]

 

       Chronic Chronic Disease Active                              [facility]



       hepatitis hepatitis               -07                               ity 

of



       B virus B virus               00:00:                             [place]



       infection infection               [address] [place] [address]

 

       Acute  Acute  Disease Active                              [facility]



       hyponatrem hyponatrem               -07                               it

y of



       ia     ia                   00:00:                             [address]

 

       Jarisch Jarisch Disease Active                              [facility]



       Herxheimer Herxheimer               1-07                               it

y of



       reaction reaction               00:00:                             [place]



                                   00                                 Medical



                                                                      Branch

 

       Chronic Chronic Disease Active                              [facility]



       hepatitis hepatitis                                              ity 

of



       C without C without               00:00:                             [place]

[place]



       hepatic hepatic                                                Medical



       coma   coma                                                    Branch

 

       Reactive Reactive Disease Active                              Unive

rs



       cervical cervical                                              ity of



       lymphadeno lymphadeno               00:00:                             [place]

[place]



       linda  linda                                                 Medical



                                                                      Branch







Allergies, Adverse Reactions, Alerts







       Allergy Allergy Status Severity Reaction(s) Onset  Inactive Treating Comm

ents 

Source



       Name   Type                        Date   Date   Clinician        

 

       NO KNOWN Drug   Active                                           [facility]



       ALLERGIE Class                                                   ity of



       S                                                              [facility]







Social History







           Social Habit Start Date Stop Date  Quantity   Comments   Source

 

           Exposure to                       Not sure              [facility]

 [place]



           SARS-CoV-2 (event)                                             Medica

l Branch

 

           Sex Assigned At 1990                       [facility]



           Birth      00:00:00   00:00:00                         [facility]









                Smoking Status  Start Date      Stop Date       Source

 

                Unknown if ever smoked                                 [facility]







Medications







       Ordered Filled Start  Stop   Current Ordering Indication Dosage Frequency

 Signature

                    Comments            Components          Source



     Medication Medication Date Date Medication? Clinician                (SIG) 

          



     Name Name                                                   

 

     bisacodyL       No             10mg      10 mg,           Unive

rs



     (DULCOLAX)                                Rectal,           ity o

f



     suppository      20:45: 20:55                          ONCE, 1           Te

xas



     10 mg      00   :00                           dose, On           Medical



                                                  [facility]



                                                  22 at           



                                                  1445,           



                                                  Routine           

 

     enoxaparin            Yes            40mg      40 mg,           Unive

rs



     (LOVENOX)                                     Subcutaneo           ity 

of



     injection      15:00:                               us, DAILY,           Te

xas



     40 mg      00                                 First dose           Medical



                                                  on [facility]



                                                  22 at           



                                                  0900,           



                                                  Until           



                                                  Discontinu           



                                                  ed,            



                                                  Routine           

 

     acetaminoph      2022- No        4647 1{tbl}      Take 1           U

nivers



     en-codeine                                tablet by           ity

 of



     300-30 mg      00:00: 05:59                          mouth           [place]



     tablet      00   :00                           every 8           Medical



                                                  (eight)           Branch



                                                  hours as           



                                                  needed for           



                                                  Pain           



                                                  (scale           



                                                  7-10) for           



                                                  up to 7           



                                                  days.           



                                                  Indication           



                                                  s: acute           



                                                  pain           

 

     acetaminoph      2022- No        4647 1{tbl}      Take 1           U

nivers



     en-codeine                                tablet by           ity

 of



     300-30 mg      00:00: 05:59                          mouth           [place]



     tablet      00   :00                           every 8           Medical



                                                  (eight)           Branch



                                                  hours as           



                                                  needed for           



                                                  Pain           



                                                  (scale           



                                                  7-10) for           



                                                  up to 7           



                                                  days.           



                                                  Indication           



                                                  s: acute           



                                                  pain           

 

     acyclovir      2022- No        [id number] 400mg      Take 2           

[facility]



     200 mg                                capsules           ity of



     capsule      00:00: 05:59                          by mouth           [place]



               00   :00                           every 8           Medical



                                                  (eight)           Branch



                                                  hours for           



                                                  5 days.           

 

     acyclovir      2022- No        [id number] 400mg      Take 2           

[facility]



     200 mg                                capsules           ity of



     capsule      00:00: 05:59                          by mouth           [place]



               00   :00                           every 8           Medical



                                                  (eight)           Branch



                                                  hours for           



                                                  5 days.           

 

     acyclovir      2022- No        [id number] 400mg      Take 2           

[facility]



     200 mg                                capsules           ity of



     capsule      00:00: 00:00                          by mouth           [place]



               00   :00                           every 8           Medical



                                                  (eight)           Branch



                                                  hours for           



                                                  7 days.           

 

     acetaminoph      -0      Yes            650mg      650 mg,           Un

neeraj



     en        1-10                               Oral,           ity of



     (TYLENOL)      22:01:                               Q8HPRN,           [place]



     tablet 650      04                                 Starting           Medic

al



     mg                                           on Mon           Branch



                                                  1/10/22 at           



                                                  1601,           



                                                  Until           



                                                  Discontinu           



                                                  ed,            



                                                  Routine,           



                                                  Pain           



                                                  (scale           



                                                  1-3)           

 

     HYDROcodone      -0      Yes            1{tbl}      1 tablet,          

 [facility]



     -acetaminop      1-10                               Oral,           ity of



     hen (NORCO      22:00:                               [facility]PRN,           [facility]

[facility]



     5) 5-325 mg      00                                 Starting           Medi

pedro



     tablet 1                                         on 



     tablet                                         1/10/22 at           



                                                  1600,           



                                                  Until           



                                                  Discontinu           



                                                  ed,            



                                                  Routine,           



                                                  Pain           



                                                  (scale           



                                                  4-6), Pain           



                                                  (scale           



                                                  7-10)           

 

     morpHINE      -0      Yes            4mg       4 mg, Slow           Uni

vers



     injection 4      1-10                               IV Push,           ity 

of



     mg        22:00:                               Q4HPRN,           [place]



               00                                 Starting           Medical



                                                  on Mon           Branch



                                                  1/10/22 at           



                                                  1600,           



                                                  Until           



                                                  Discontinu           



                                                  ed,            



                                                  Routine,           



                                                  can give w           



                                                  norco if           



                                                  neccesary           



                                                  for            



                                                  breakthrou           



                                                  gh pain           

 

     glycerin/mi      -0      Yes            225mL      225 mL,           Un

neeraj



     neral oil      1-10                               Rectal,           ity of



     (AGLO      21:58:                               PRN,           [place]



     ENEMA)      57                                 Starting           Medical



     (COMPOUNDED                                         on 



     ) Enem 225                                         1/10/22 at           



     mL                                           1558,           



                                                  Until           



                                                  Discontinu           



                                                  ed,            



                                                  Routine,           



                                                  Constipati           



                                                  on             



                                                  unresolved           



                                                  by oral           



                                                  medication           



                                                  s              

 

     magnesium            Yes            30mL      30 mL,           Univer

s



     hydroxide      -10                               Oral, BID,           ity 

of



     (MILK OF      02:00:                               First dose           [facility]

as



     MAGNESIA)      00                                 on [place]           Medical



     400 mg/5 mL                                         22 at           Bra

[facility]



     suspension                                         2000,           



     30 mL                                         Until           



                                                  Discontinu           



                                                  ed,            



                                                  Routine           

 

     bisacodyL      2022- No             10mg      10 mg,           Unive

rs



     (DULCOLAX)      1-10 01-10                          Oral,           ity of



     tablet 10      00:45: 14:11                          DAILY, 2           [facility]

as



     mg        00   :00                           doses,           Medical



                                                  First dose           Branch



                                                  on Sun           



                                                  22 at           



                                                  1845, Last           



                                                  dose on           



                                                  Mon            



                                                  1/10/22 at           



                                                  0900,           



                                                  Routine           

 

     acyclovir            Yes            400mg      400 mg,           Univ

ers



     (ZOVIRAX)      09                               Oral, Q8H,           ity 

of



     capsule 400      12:00:                               First dose           

[place]



     mg        00                                 on [facility]



                                                  22 at           Branch



                                                  0600,           



                                                  Until           



                                                  Discontinu           



                                                  ed, ASAP           

 

     doxycycline       No             100mg      100 mg,           U

nivers



     hyclate      11                          Oral,           ity of



     (Vibramycin      12:00: 08:08                          Q12HA2,           Te

xas



     ) capsule      00   :27                           First dose           Medi

pedro



     100 mg                                         on [facility]



                                                  22 at           



                                                  0600,           



                                                  Until           



                                                  Discontinu           



                                                  ed,            



                                                  ASAP<br>Re           



                                                  ason for           



                                                  Anti-Infec           



                                                  tive:           



                                                  Documented           



                                                  Infection<           



                                                  br>Documen           



                                                  weston            



                                                  Infection           



                                                  Site:           



                                                  Other<br>O           



                                                  ther site:           



                                                  syphillis<           



                                                  br>Duratio           



                                                  n of           



                                                  Therapy:           



                                                  10 days           

 

     nicotine            Yes            1{patch      1 Patch,           Un

neeraj



     (NICODERM)      -09                     }         Topical,           ity o

f



     21 mg/24 hr      01:00:                               Administer           

[place]



     patch 1      00                                 over 24           Medical



     Patch                                         Hours,           Branch



                                                  Q24H,           



                                                  First dose           



                                                  on Sat           



                                                  22 at           



                                                  1900,           



                                                  Until           



                                                  Discontinu           



                                                  ed,            



                                                  Routine           

 

     penicillin      -2022- No             310       3,000,000           U

nivers



     GK 3       01-09                          Units (3           ity of



     million      03:30: 05:37                          Million           [place]



     units in NS      00   :29                           Units), IV           Me

dical



     50 mL IV                                         Piggyback,           Branc

h



     infusion                                         Q4H ABX,           



     (CNR)                                         First dose           



                                                  on Fri           



                                                  22 at           



                                                  2130,           



                                                  Until           



                                                  Discontinu           



                                                  ed,            



                                                  Administer           



                                                  over 60           



                                                  Minutes,           



                                                  50             



                                                  mL<br>Reas           



                                                  on for           



                                                  Anti-Infec           



                                                  tive:           



                                                  Empiric           



                                                  Therapy           



                                                  for            



                                                  Suspected           



                                                  Infection<           



                                                  br>Empiric           



                                                  Therapy           



                                                  Site:           



                                                  Blood<br>D           



                                                  uration of           



                                                  therapy: 7           



                                                  days           

 

     bisacodyL       No             5mg       5 mg,           Univer

s



     (DULCOLAX)                                Oral,           ity of



     tablet 5 mg      02:45: 13:52                          DAILY, 2           [facility]

[facility]



               00   :00                           doses,           Medical



                                                  First dose           [facility]



                                                  on 22 at           



                                                  2045, Last           



                                                  dose on           



                                                  Sat 22           



                                                  at 0900,           



                                                  Routine           

 

     lactated       No             1000mL      at 100           Univ

ers



     ringers IV      -09                          mL/hr,           ity of



     infusion      00:30: 00:29                          1,000 mL,           [facility]

as



     1,000 mL      00   :00                           IV             Medical



                                                  Infusion,           [facility]



                                                  CONTINUOUS           



                                                  , Starting           



                                                  on 22 at           



                                                  1830,           



                                                  Until Sat           



                                                  22 at           



                                                  1829, ASAP           

 

     hydrOXYzine            Yes            25mg      25 mg,           Univ

ers



     (ATARAX)                                     Oral,           ity of



     tablet 25      23:27:                               Q6HPRN,           [place]



     mg        50                                 Starting           Medical



                                                  on Fri           [facility]



                                                  22 at           



                                                  1727,           



                                                  Until           



                                                  Discontinu           



                                                  ed,            



                                                  Routine,           



                                                  Anxiety           

 

     FENTanyl PF       No             50ug      50 mcg,           Un

neeraj



     (SUBLIMAZE                                Slow IV           ity o

f



     (PF))      23:20: 00:14                          Push,           [place]



     injection      00   :00                           ONCE, 1           Medical



     50 mcg                                         dose, On           [facility]



                                                  Fri 22           



                                                  at 1730,           



                                                  Routine           

 

     piperacilli      2022- No             4.5g      4.5 g, IV           

[facility]



     n-tazobacta                                Piggyback,           i

ty of



     m (ZOSYN)      22:45: 23:03                          Q6H ABX,           [facility]

as



     4.5 g in      00   :49                           First dose           Medic

al



     NaCl 0.9%                                         on Fri           [facility]



     (NS) 100 mL                                         22 at           



     MINI-BAG                                         1645,           



                                                  Until           



                                                  Discontinu           



                                                  ed,            



                                                  Administer           



                                                  over 30           



                                                  Minutes,           



                                                  100            



                                                  mL<br>Reas           



                                                  on for           



                                                  Anti-Infec           



                                                  tive:           



                                                  Documented           



                                                  Infection<           



                                                  br>Documen           



                                                  weston            



                                                  Infection           



                                                  Site:           



                                                  Abdominal<           



                                                  br>Duratio           



                                                  n of           



                                                  Therapy: 7           



                                                  days           

 

     sennosides-            Yes            1{tbl}      1 tablet,          

 [facility]



     docusate                                     Oral,           ity of



     sodium      21:45:                               DAILY,           [place]



     (SENOKOT-S)      00                                 First dose           Me

dical



     8.6-50 mg                                         on 



     per tablet                                         22 at           



     1 tablet                                         1545,           



                                                  Until           



                                                  Discontinu           



                                                  ed,            



                                                  Routine           

 

     polyethylen            Yes            17g       17 g,           Unive

rs



     e glycol      07                               Oral, BID,           ity o

f



     3350 powder      21:45:                               First dose           

[place]



     17 g      00                                 on Fri           [facility]



                                                  22 at           [facility]



                                                  1545,           



                                                  Until           



                                                  Discontinu           



                                                  ed,            



                                                  Routine           

 

     morpHINE      2022- No             6mg       6 mg, Slow           Un

neeraj



     injection 6      10                          IV Push,           ity

 of



     mg        21:31: 21:58                          Q3HPRN,           [place]



               05   :01                           Starting           Medical



                                                  on Fri           [facility]



                                                  22 at           



                                                  1531,           



                                                  Until Mon           



                                                  1/10/22 at           



                                                  1558,           



                                                  Routine,           



                                                  Pain           



                                                  (scale           



                                                  4-6), Pain           



                                                  (scale           



                                                  7-10), can           



                                                  give w           



                                                  norco if           



                                                  neccesary           



                                                  for            



                                                  breakthrou           



                                                  gh pain           

 

     ondansetron            Yes            4mg       4 mg, Slow           

[facility]



     (ZOFRAN                                     IV Push,           ity of



     (PF))      21:30:                               Q6HPRN,           [place]



     injection 4      02                                 Starting           Medi

pedro



     mg                                           on Fri           [facility]



                                                  22 at           



                                                  1530,           



                                                  Until           



                                                  Discontinu           



                                                  ed,            



                                                  Routine,           



                                                  Nausea and           



                                                  Vomiting           



                                                  (N/V)           

 

     morpHINE      2022- No                       PRN,           [facility]



     injection                                Starting           ity o

f



               19:16: 21:29                          on Fri           [place]



               10   :46                           22 at           Medical



                                                  1316,           [facility]anch



                                                  Until 22 at           



                                                  1529,           



                                                  Routine           

 

     tc        2022- No        [id number] [facility]      10             [facility] [place] [facility]



     [id number]                                millicurie           i

ty of



     amy       16:15: 17:45                          ,              [place]



     injection      00   :00                           Intravenou           Medi

pedro



     10                                           s, ONCE, 1           Branch



     millicurie                                         dose, On           



                                                  Fri 22           



                                                  at 1015,           



                                                  Routine           

 

     penicillin      2022- No             2.410      2.4            Unive

rs



     g                                   [place]           ity of



     benzathine      06:15: 08:32                          Units,           [place]

[place]



     (BICILLIN      00   :00                           Intramuscu           Medi

pedro



     L-A)                                         lar, ONCE,           [facility]



     injection                                         1 dose, On           



     2.4 Million                                         Fri 22           



     Units                                         at 0015,           



                                                  ASAP<br>Re           



                                                  ason for           



                                                  Anti-Infec           



                                                  tive:           



                                                  Documented           



                                                  Infection<           



                                                  br>Documen           



                                                  weston            



                                                  Infection           



                                                  Site:           



                                                  Blood<br>D           



                                                  uration of           



                                                  Therapy:           



                                                  Other (see           



                                                  Comments)           

 

     HYDROcodone      2022- No             1{tbl}      1 tablet,         

  [facility]



     -acetaminop      1-07 01-10                          Oral, Q6H,           i

ty of



     hen (NORCO      02:45: 21:58                          First dose           

[place]



     5) 5-325 mg      00   :01                           on Thu           Medica

l



     tablet 1                                         22 at           [place]



     tablet                                         ,           



                                                  Until           



                                                  Discontinu           



                                                  ed,            



                                                  Routine           

 

     morpHINE      2022- No             5mg       5 mg, Slow           Un

neeraj



     injection 5                                IV Push,           ity

 of



     mg        02:32: 21:31                          Q3HPRN,           [place]



               20   :17                           Starting           Medical



                                                  on u           [facility]



                                                  22 at           



                                                  203,           



                                                  Until 22 at           



                                                  1531,           



                                                  Routine,           



                                                  Pain           



                                                  (scale           



                                                  4-6), Pain           



                                                  (scale           



                                                  7-10), can           



                                                  give w           



                                                  norco if           



                                                  neccesary           



                                                  for            



                                                  breakthrou           



                                                  gh pain           

 

     ibuprofen            Yes            600mg      600 mg,           Univ

ers



     (IBU)                                     Oral,           ity of



     tablet 600      02:30:                               Q8HPRN,           [place]

[place]



     mg        45                                 Starting           Medical



                                                  on u           [facility]



                                                  22 at           



                                                  2030,           



                                                  Until           



                                                  Discontinu           



                                                  ed,            



                                                  Routine,           



                                                  Temp >           



                                                  38.5 C           

 

     lidocaine      2022- No             10mL      10 mL,           Unive

rs



     PF 2%                                Subcutaneo           ity of



     (XYLOCAINE-      01:15: 01:15                          us, ONCE           T

[facility]



     MPF)      00   :00                           NOW, 1           Medical



     injection                                         dose, On           [facility]



     10 mL                                         u 22           



                                                  at 1915,           



                                                  Routine           

 

     morpHINE      2022- No             4mg       4 mg, Slow           Un

neeraj



     injection 4      07                          IV Push,           ity

 of



     mg        18:47: 02:31                          Q4HPRN,           [place]



               23   :29                           Starting           Medical



                                                  on u           [facility]



                                                  22 at           



                                                  1247,           



                                                  Until Thu           



                                                  22 at           



                                                  2031,           



                                                  Routine,           



                                                  Pain           



                                                  (scale           



                                                  7-10)           

 

     acetaminoph      2022- No             650mg      650 mg,           U

nivers



     en        1-06 01-10                          Oral,           ity of



     (TYLENOL)      18:47: 22:01                          Q8HPRN,           [place]

[place]



     tablet 650      00   :15                           Starting           Medic

al



     mg                                           on u           [facility]



                                                  22 at           



                                                  1247,           



                                                  Until Mon           



                                                  1/10/22 at           



                                                  1601,           



                                                  Routine,           



                                                  Pain           



                                                  (scale           



                                                  1-3), Temp           



                                                  > 38.5 C           

 

     morpHINE      2022- No             2mg       2 mg, Slow           Un

neeraj



     injection 2                                IV Push,           ity

 of



     mg        14:53: 18:47                          Q4HPRN, [address] [place] [address]



               08   :58                           doses,           Medical



                                                  Starting           [facility]



                                                  on u           



                                                  22 at           



                                                  0853,           



                                                  Until Thu           



                                                  22 at           



                                                  1247,           



                                                  Routine,           



                                                  Pain           



                                                  (scale           



                                                  7-10)           

 

     heparin      2022- No             5000U      5,000           [facility]



     (porcine)      1-06 01-10                          Units,           ity of



     injection      14:00: 21:59                          Subcutaneo           T

[facility]



     5,000 Units      00   :38                           us, Q12H,           Med

ical



                                                  First dose           [facility]



                                                  on u           



                                                  22 at           



                                                  0800,           



                                                  Until           



                                                  Discontinu           



                                                  ed,            



                                                  Routine           

 

     ibuprofen      2022- No             400mg      400 mg,           Uni

vers



     (IBU)                                Oral,           ity of



     tablet 400      10:14: 18:47                          Q6HPRN,           [facility]

as



     mg        06   :58                           Starting           Medical



                                                  on u           [facility]anch



                                                  22 at           



                                                  0414,           



                                                  Until Thu           



                                                  22 at           



                                                  1247,           



                                                  Routine,           



                                                  Pain           



                                                  (scale           



                                                  1-3), Temp           



                                                  > 38.5 C           

 

     ampicillin-      2022- No             3g        3 g, IV           Un

neeraj



     sulbactam                                Piggyback,           ity

 of



     (UNASYN) 3      09:30: 04:58                          Q6H ABX,           [facility]

[facility]



     g in NaCl      00   :28                           First dose           Medi

pedro



     0.9% (NS)                                         on Thu           [facility]



     100 mL                                         22 at           



     MINI-BAG                                         0330,           



                                                  Until           



                                                  Discontinu           



                                                  ed,            



                                                  Administer           



                                                  over 30           



                                                  Minutes,           



                                                  100            



                                                  mL<br>Reas           



                                                  on for           



                                                  Anti-Infec           



                                                  tive:           



                                                  Empiric           



                                                  Therapy           



                                                  for            



                                                  Suspected           



                                                  Infection<           



                                                  br>Empiric           



                                                  Therapy           



                                                  Site:           



                                                  HEENT<br>D           



                                                  uration of           



                                                  therapy: 7           



                                                  days           

 

     traMADoL       No             50mg      50 mg,           Univer

s



     (ULTRAM)                                Oral,           ity of



     tablet 50      08:25: 02:31                          [id number],           [place]

s



     mg        50   :29                           Starting           Medical



                                                  on Thu           Branch



                                                  22 at           



                                                  0225,           



                                                  Until Thu           



                                                  22 at           



                                                  2031,           



                                                  Routine,           



                                                  Pain           



                                                  (scale           



                                                  4-6)           

 

     ibuprofen      2022- No             600mg      600 mg,           Uni

vers



     (IBU)                                Oral,           ity of



     tablet 600      04:00: 02:53                          ONCE, 1           Mahesh

as



     mg        00   :00                           dose, On           Medical



                                                  Wed 22           Branch



                                                  at 2200,           



                                                  ASAP           

 

     vancomycin      2022- No             1000mg      1,000 mg,          

 Univers



     (VANCOCIN)                                IV             ity of



     1,000 mg in      03:00: 03:53                          [place]



     NaCl 0.9%      00   :00                           ONCE, 1           Medical



     (NS) 250 mL                                         dose, On           [facility]



     VIAL-MATE                                         Wed 22           



     IV                                           at 2100,           



     piggyback                                         Administer           



                                                  over 60           



                                                  Minutes,           



                                                  250            



                                                  mL<br>Reas           



                                                  on for           



                                                  Anti-Infec           



                                                  tive:           



                                                  Empiric           



                                                  Therapy           



                                                  for            



                                                  Suspected           



                                                  Infection<           



                                                  br>Empiric           



                                                  Therapy           



                                                  Site:           



                                                  Blood<br>D           



                                                  uration of           



                                                  therapy:           



                                                  72 hours           

 

     cefTRIAXone       No             1000mg      1,000 mg,         

  Univers



     (ROCEPHIN)                                IV             ity of



     1,000 mg in      03:00: 03:11                          Piggyback,          

 [place]



     NaCl 0.9%      00   :00                           ONCE, 1           Medical



     (NS) 50 mL                                         dose, On           [facility]

h



     MINI-BAG                                         Wed 22           



                                                  at 2100,           



                                                  Administer           



                                                  over 30           



                                                  Minutes,           



                                                  50             



                                                  mL<br&gt;R           



                                                  gaurav for           



                                                  Anti-Infec           



                                                  tive:           



                                                  Empiric           



                                                  Therapy           



                                                  for            



                                                  Suspected           



                                                  Infection<           



                                                  br>Empiric           



                                                  Therapy           



                                                  Site:           



                                                  Blood<br>D           



                                                  uration of           



                                                  therapy:           



                                                  72 hours           

 

     iopamidol      2022- No        [id number] 100mL      100 mL,         

  Univers



     (ISOVUE                                Intravenou           ity o

f



     370-500 mL)      02:17: 02:18                          s, ONCE, 1          

 [place]



     injection      00   :00                           dose, On           Medica

l



     100 mL                                         22           Branch



                                                  at 2030,           



                                                  Routine           

 

     morpHINE      2022- No             4mg       4 mg, Slow           Un

neearj



     injection 4                                IV Push,           ity

 of



     mg        02:15: 01:38                          ONCE, 1           [place]



               00   :00                           dose, On           Medical



                                                  22           Branch



                                                  at 2015,           



                                                  STAT           

 

     NaCl 0.9%      2022- No             1000mL      at 999           Uni

vers



     (NS) IV                                mL/hr, IV           ity of



     infusion      01:15: 01:46                          Infusion,           Amhesh

as



     1,000 mL      00   :00                           ONCE, 1           Medical



                                                  dose, On           Branch



                                                  22           



                                                  at 1915,           



                                                  Routine           

 

     NaCl 0.9%      2022- No             1000mL      at 999           Uni

vers



     (NS) bolus      06                          mL/hr,           ity of



     infusion      23:45: 00:14                          1,000 mL,           Mahesh

as



     1,000 mL      00   :00                           IV             Medical



                                                  Infusion,           Branch



                                                  ONCE, 1           



                                                  dose, On           



                                                  22           



                                                  at 1745,           



                                                  ASAP           

 

     ketorolac      2022- No             30mg      30 mg,           Unive

rs



     (TORADOL)                                Slow IV           ity of



     injection      23:26: 23:27                          Push,           [place]



     30 mg      00   :00                           ONCE, 1           Medical



                                                  dose, On           Branch



                                                  22           



                                                  at 1730,           



                                                  ASAP           

 

     casirivimab      2021- No        [id number] 1200mg      1,200 mg,    

       Univers



     -imdevimab                                Subcutaneo           it

y of



     (REGEN-COV      23:45: 22:33                          us, ONCE,           T

exas



     (EUA))      00   :00                           1 dose, On           Medical



     injection                                         Thu            Branch



     (CO-FORMULA                                         21           



     TION) 1,200                                         at 1745,           



     mg                                           Routine           







Vital Signs







             Vital Name   Observation Time Observation Value Comments     Source

 

             Systolic blood 2022 18:00:00 113 mm[Hg]                Univer

sity of



             pressure                                            [facility] [place] [facility]

 

             Diastolic blood 2022 18:00:00 69 mm[Hg]                 Unive

rsity of



             pressure                                            [facility] [place] [facility]

 

             Heart rate   2022 18:00:00 83 /min                   Universi

ty of



                                                                 [facility] [place] [facility]

 

             Body temperature 2022 18:00:00 35.83 Roxana                 Univ

ersity of



                                                                 [facility] [place] [facility]

 

             Oxygen saturation in 2022 18:00:00 99 /min                   

University of



             Arterial blood by                                        [place] [facility]

pedro



             Pulse oximetry                                        Branch

 

             Respiratory rate 2022 09:55:00 16 /min                   Univ

ersity of



                                                                 [facility] [place] [facility]

 

             Body height  2022 07:45:00 170.2 cm                  Universi

ty of



                                                                 [facility] [place] [facility]

 

             Body weight  2022 07:45:00 65.318 kg                 Universi

ty of



                                                                 [facility] [place] [facility]

 

             BMI          2022 07:45:00 22.55 kg/m2               Universi

ty of



                                                                 [facility] [place] [facility]

 

             Systolic blood 2021 23:18:00 131 mm[Hg]                Univer

sity of



             pressure                                            [facility] [place] [facility]

 

             Diastolic blood 2021 23:18:00 77 mm[Hg]                 Unive

rsity of



             pressure                                            [facility] [place] [facility]

 

             Heart rate   2021 23:18:00 89 /min                   Universi

ty of



                                                                 [facility] [place] [facility]

 

             Body temperature 2021 23:18:00 37.22 Roxana                 Univ

ersity of



                                                                 [facility] [place] [facility]

 

             Respiratory rate 2021 23:18:00 20 /min                   Univ

ersity of



                                                                 [facility] [place] [facility]

 

             Oxygen saturation in 2021 23:18:00 99 /min                   

University of



             Arterial blood by                                        [place] Medi

pedro



             Pulse oximetry                                        Branch

 

             Body height  2021 22:30:00 170.2 cm                  Universi

ty of



                                                                 [facility] [place] [facility]

 

             Body weight  2021 22:30:00 61.689 kg                 Universi

ty of



                                                                 [facility] [place] [facility]

 

             BMI          2021 22:30:00 21.30 kg/m2               Universi

ty of



                                                                 [facility] [place] [facility]







Procedures







                Procedure       Date / Time     Performing Clinician Source



                                Performed                       

 

                XR KUB          2022 08:53:00 Mya Domingo o

[facility]

 

                GC & CHLAMYDIA AMPLIFIED 2022 16:21:00 Mya Domingo

[facility]

 

                GC & CHLAMYDIA AMPLIFIED 2022 16:19:00 DomingoMya  Uni

[facility] [place]



                ASSAY                                           [facility]

 

                COMP. METABOLIC PANEL 2022 12:36:00 Sanamniraj Kyung  [facility]



                ([id number])                                         [facility] [place]

 

                GC & CHLAMYDIA AMPLIFIED 2022 07:24:00 DomingoEldonah  [facility]

 

                HSV 1&2, VZV NAAT 2022 07:24:00 Mya Domingo  [facility]

 

                US ABDOMEN LIMITED 2022 15:45:14 [facility]

 

                COMP. METABOLIC PANEL 2022 11:36:00 [facility]



                ([id number])                         [facility]

 

                CBC WITH DIFF   2022 11:36:00 [facility]

 

                HEPATITIS B VIRUS (HBV) 2022 11:36:00 [facility]



                BY QUANTITATIVE NAAT                 [facility]

 

                XR ABDOMEN 2 VW 2022 01:05:00 [facility]

 

                HB ECG ROUTINE & RHYTHM 2022 23:18:53 [facility]



                STRIP                           [facility]

 

                NM HEPATOBILIARY W 2022 20:00:00 [facility]



                INTERVENTION                    [facility]

 

                FERRITIN SERUM  2022 11:07:00 [facility]

 

                IRON            2022 11:07:00 [facility]

 

                BILI UNCONJUGATED/BILI 2022 11:07:00 [facility]



                CONJUG                          [facility]

 

                COMP. METABOLIC PANEL 2022 11:07:00 [facility]



                ([id number])                         [facility]

 

                CBC WITH DIFF   2022 11:07:00 Brea   [place]

f [facility]

 

                CEREBROSPINAL FLUID 2022 03:31:00 Gurjit Lux [facility]



                PROTEIN                                         [facility]

 

                CEREBROSPINAL FLUID 2022 03:31:00 Gurjit Lux [facility]



                GLUCOSE                                         [facility]

 

                BODY FLUID DIRECT COUNT 2022 03:31:00 Gurjit Lux [facility]

 

                CSF/ SHUNT CULTURE 2022 03:31:00 Gurjit Lux [facility]

 

                FUNGUS (ROUTINE) CULTURE 2022 03:31:00 Gurjit Lux [facility]

[facility]

 

                CSF CULTURE     2022 03:31:00 Gurjit Lux [facility]

 

                TRANSTHORACIC ECHO (TTE) 2022 21:37:48 [facility]

 

                CMV BY PCR      2022 15:25:00 Gurjit Lux [facility]

 

                GALV ONLY - SYPHILIS 2022 10:00:00 Gurjit Lux [facility]



                IGG/IGM                                         [facility]

 

                HEPATITIS B SURFACE 2022 10:00:00 Gurjit Lux [facility]



                ANTIBODY                                        [facility]

 

                RPR (QUANTITATIVE) 2022 10:00:00 Gurjit Lux [facility]

 

                MRSA / MSSA SCREEN BY 2022 09:25:00 Gurjit Lux [facility]



                PCR, NARES                                      [facility]

 

                THROAT CULTURE  2022 09:16:00 Gurjit Lux [facility]

 

                HAPTOGLOBIN, SERUM 2022 09:13:00 [facility]

 

                C-REACTIVE PROTEIN 2022 09:13:00 Gurjit Lux [facility]

 

                HEPATIC FUNCTION PANEL 2022 09:13:00 Gurjit Lux [facility]



                ([id number]) (ALB,T.PRO,BILI                                 [facility] 

Branch



                T,BU/BC,ALT,AST,ALK PHOS)                                 

 

                BASIC METABOLIC PANEL 2022 09:13:00 Gurjit Lux [facility]



                (NA, K, CL, CO2, GLUCOSE,                                 Medica

l Branch



                BUN, CREATININE, CA)                                 

 

                SEDIMENTATION RATE 2022 09:13:00 Gurjit Lux [facility]

 

                DIFF CONSULT    2022 09:13:00 [facility]

 

                CBC WITH DIFF   2022 09:13:00 [facility]

 

                D-DIMER         2022 09:13:00 Gurjit Lux [facility]

 

                HEPATITIS B SURFACE 2022 09:13:00 Gurjit Lux [facility]



                ANTIGEN                                         [facility]

 

                HCV ANTIBODY    2022 09:13:00 [facility]

 

                HEPATITIS A VIRUS 2022 09:13:00 Gurjit Lux [facility]



                ANTIBODY IGM                                    [facility]

 

                HEPATITIS B CORE ANTIBODY 2022 09:13:00 Gurjit Lux [facility]



                IGM                                             [facility]

 

                HEPATITIS C VIRUS (HCV) 2022 09:13:00 Gurjit Lux [facility]



                BY QUANTITATIVE NAAT                                 [facility]

 

                QUANTIFERON-TB ASSAY 2022 09:13:00 Gurjit Lux [facility]

 

                TYPHUS FEVER AB, IGM 2022 09:13:00 Gurjit Lux [facility]

 

                URINE DRUG (IMMUNOASSAY) 2022 09:00:00 Gurjit Lux [facility]



                - COMPREHENSIVE DRUG                                 [facility]



                SCREEN                                          

 

                URINE DRUG (LCMSMS) - 2022 09:00:00 Gurjit Lux [facility]



                SYNTHETIC OPIATES PANEL                                 Medical 

Branch

 

                CT ABDOMEN PELVIS W 2022 02:22:48 Abdiel Talavera [facility]



                CONTRAST                                        [facility]

 

                CT SOFT TISSUE NECK W 2022 02:22:48 Abdiel Talavera [facility]



                CONTRAST                                        [facility]

 

                CT CHEST PULMONARY 2022 02:22:48 Abdiel Talavera [facility]



                ANGIOGRAM                                       [facility]

 

                US GALL BLADDER 2022 01:24:40 Abdiel Talavera [facility]

 

                XR CHEST 1 VW   2022 00:32:07 Abdiel Talavera [facility]

 

                URINALYSIS      2022 23:30:00 Abdiel Talavera [facility]

 

                URINE CULTURE   2022 23:30:00 Abdiel Talavera [facility]

 

                HB ECG ROUTINE & RHYTHM 2022 23:28:50 Abdiel Talavera [facility]

 

                ASSIGNMENT OF BENEFITS 2022 23:13:59 Doctor Unassigned, Un

[facility]



                                                [place]ivereno         [facility]

 

                LACTIC ACID WHOLE BLOOD 2022 23:08:00 Abdiel Talavera [facility]

 

                BLOOD CULTURE SCREEN 2022 23:07:00 Abdiel [facility]a [facility]

[facility]

 

                CREATINE KINASE 2022 23:07:00 Gurjit Lux [facility]

 

                LIPASE          2022 23:07:00 Garry Anne [place] o

f [facility]

 

                TROPONIN I      2022 23:07:00 Abdiel Talavera [facility]

 

                THYROID STIMULATING 2022 23:07:00 Gurjit Lux [facility]



                HORMONE                                         [facility]

 

                COMP. METABOLIC PANEL 2022 23:07:00 Abdiel [facility]a [facility]

[facility]



                ([id number])                                         [facility]

 

                ACETAMINOPHEN   2022 23:07:00 Gurjit Lux [facility]

 

                ETHANOL         2022 23:07:00 Gurjit Lux [facility]

 

                CBC WITH DIFF   2022 23:07:00 Abdiel Talavera [facility]

 

                EBV-MONONUCLEOSIS SCREEN 2022 23:07:00 Garry Anne [facility]

 

                N-TERMINAL PRO-BNP 2022 23:07:00 Gurjit Lux [facility]

 

                HIV 1/2 AG-AB WITH REFLEX 2022 23:07:00 Gurjit Lux

[facility]

 

                RAPID INFLUENZA A/B 2022 23:06:00 Abdiel Talavera [facility]

 

                COVID-19 (ID NOW RAPID 2022 23:06:00 Abdiel Talavera [facility]

[facility] [place]



                TESTING)                                        [facility] [place]

 

                LAB ONLY COVID  2022 23:06:00 Abdiel Talavera [facility]



                INTERPRETATION                                  [facility]

 

                CONSENT/REFUSAL FOR 2022 22:25:40 Doctor Unassigned, [facility]



                DIAGNOSIS AND TREATMENT                 [place]o         [facility]

 

                NOTICE OF PRIVACY 2022 22:24:32 Doctor Unassigned, [facility]



                PRACTICES                       [place]         [facility]

 

                IMMTRAC2 CONSENT 2021 06:01:00 [facility]



                                                [place]         [facility]







Encounters







        Start   End     Encounter Admission Attending Care    Care    Encounter 

Source



        Date/Time Date/Time Type    Type    Clinicians Facility Department ID   

   

 

        2022 Transition         ANUEL Palomares  [id number]

[id number] [facility]



        00:00:00 00:00:00 of Care         Criss APPIAH   350.1.13.10        

 [facility]   4.2.7.2.686         [place]

s



                                                        [phone]         Medi

pedro



                                                        [address]             Branch

 

        2022 Inpatient X       [facility]    MEGAN     [id number]

[id number] Univers



        16:45:00 16:48:00                                                 [facility]

 

        2022 [facility]         Abdiel Talavera  1.2.840.

[id number] 

Univers



        16:45:00 16:48:00 Encounter         Aakash Hoffman   350.1.1

3.10         [facility] 4.2.7.2.686  

       Chad Valera         [phone]  

       Medical



                                                        096             Branch

 

        2021 Nurse           Therapy, [facility]  

  1.2.840.114 

[id number]                                Univers



        16:00:00 17:00:00 Visit           Armand Pierce 350.1.13.10   

      ity of



                                                [place] 4.2.7.2.686         [place]

s



                                                SURGICAL [phone]         Med

ical



                                                CENTER  [address]             Branch

 

        2021 Outpatient R       STAN  [facility]    [id number]

[id number] Univers



        16:00:00 16:00:00                 ARMAND                           ity of



                                                                        [facility]

 

        2021 Orders          Doctor  CATARINO    [id number]

21 Univers



        00:00:00 00:00:00 Only            Unassigned, GRANT   350.1.13.10       

  ity of



                                        [place] \[facility]\"" 4.2.7.2.686         Mahesh

as



                                                        [phone]         [address]







Results







           Test Description Test Time  Test Comments Results    Result Comments 

Source









                    Blood Culture - Peripheral # 1 2022 00:01:47 









                      Test Item  Value      Reference Range Interpretation Comme

nts









             Blood Culture-Aerobic (test No organisms isolated No growth        

         Previous 

preliminary



             code = 17928-3)                                        verified res

ult was



                                                                 Culture In Prog

ress on



                                                                 2022 at 210

1



                                                                 CSTPrevious pre

liminary



                                                                 verified result

 was No



                                                                 growth at 24 ho

urs on



                                                                 2022 at 180

1



                                                                 CSTPrevious pre

liminary



                                                                 verified result

 was No



                                                                 growth at 48 ho

urs on



                                                                 2022 at 180

1



                                                                 CSTPrevious pre

liminary



                                                                 verified result

 was No



                                                                 growth at 72 ho

urs on



                                                                 2022 at 180

1 CST

 

             Blood Culture-Anaerobic No organisms isolated No growth            

     Previous preliminary



             (test code = 78486-5)                                        verifi

ed result was



                                                                 Culture In Prog

ress on



                                                                 2022 at 210

1



                                                                 CSTPrevious pre

liminary



                                                                 verified result

 was No



                                                                 growth at 24 ho

urs on



                                                                 2022 at 180

1



                                                                 CSTPrevious pre

liminary



                                                                 verified result

 was No



                                                                 growth at 48 ho

urs on



                                                                 2022 at 180

1



                                                                 CSTPrevious pre

liminary



                                                                 verified result

 was No



                                                                 growth at 72 ho

urs on



                                                                 2022 at 180

1 CST

 

             Lab Interpretation (test Normal                                 



             code = 76752-1)                                        



[facility] [id number] 
00:01:47





             Test Item    Value        Reference Range Interpretation Comments

 

             Blood Culture-Aerobic No organisms No growth                 Previo

us



             (test code = 17928-3) isolated                               prelim

inary



                                                                 verified result



                                                                 was Culture In



                                                                 Progress on



                                                                 2022 at 210

1



                                                                 CSTPrevious



                                                                 preliminary



                                                                 verified result



                                                                 was No growth a

t



                                                                 24 hours on



                                                                 2022 at 180

1



                                                                 CSTPrevious



                                                                 preliminary



                                                                 verified result



                                                                 was No growth a

t



                                                                 48 hours on



                                                                 2022 at 180

1



                                                                 CSTPrevious



                                                                 preliminary



                                                                 verified result



                                                                 was No growth a

t



                                                                 72 hours on



                                                                 2022 at 180

1



                                                                 CST

 

             Blood        No organisms No growth                 Previous



             Culture-Anaerobic isolated                               preliminar

y



             (test code = 00148-5)                                        verifi

ed result



                                                                 was Culture In



                                                                 Progress on



                                                                 2022 at 210

1



                                                                 CSTPrevious



                                                                 preliminary



                                                                 verified result



                                                                 was No growth a

t



                                                                 24 hours on



                                                                 2022 at 180

1



                                                                 CSTPrevious



                                                                 preliminary



                                                                 verified result



                                                                 was No growth a

t



                                                                 48 hours on



                                                                 2022 at 180

1



                                                                 CSTPrevious



                                                                 preliminary



                                                                 verified result



                                                                 was No growth a

t



                                                                 72 hours on



                                                                 2022 at 180

1



                                                                 CST

 

             Lab Interpretation Normal                                 



             (test code = 04702-4)                                        



[facility] NAAT
2022 21:17:20





             Test Item    Value        Reference Range Interpretation Comments

 

             HCV Quantitative NAAT <1.00        Not Detected log              



             - log IU/mL (test code              IU/mL                     



             = 84988-0)                                          

 

             HCV Quantitative NAAT <10          Not Detected              



             - IU/mL (test code =              IU/mL                     



             83030-1)                                            

 

             HCV Quantitative Detected, not Not Detected A            



             Interpretation (test Quantifiable                           



             code = 9587243153)                                        

 

             SKYLER (test code = SKYLER) The Aptima HCV Quant                         

  



                          Dx assay is an                           



                          FDA-approved real-time                           



                          transcription-mediated                           



                          amplification (TMA)                           



                          test used for both                           



                          detection and                           



                          quantitation of                           



                          hepatitis C virus                           



                          (HCV) RNA in human                           



                          serum and plasma from                           



                          HCV-infected                           



                          individuals. ?It is                           



                          intended for use as an                           



                          aid in the diagnosis                           



                          of active HCV                           



                          infection and the                           



                          management of                           



                          HCV-infected patients                           



                          undergoing HCV                           



                          antiviral drug                           



                          therapy. ?It is not                           



                          approved for use as a                           



                          screening test for the                           



                          presence of HCV RNA in                           



                          blood or blood                           



                          products. The                           



                          quantitative range of                           



                          this assay is 1.00 -                           



                          8.00 log IU/mL or 10 -                           



                          100,000,000 IU/mL. An                           



                          interpretation of "Not                           



                          Detected" does not                           



                          rule out the presence                           



                          of inhibitors in the                           



                          patient specimen or                           



                          HCV RNA concentration                           



                          below the level of                           



                          detection of the test.                           



                          ?Care should be taken                           



                          when interpreting any                           



                          single viral load                           



                          determination.                           



                          Detected, not                           



                          Quantifiable: HCV RNA                           



                          detected, but at a                           



                          level below 10 IU/mL                           



                          (1.0 log IU/mL). ?HCV                           



                          RNA concentration is                           



                          below the lower limit                           



                          of quantitation of the                           



                          assay. Indeterminate:                           



                          Error indicated in the                           



                          generation of the                           



                          result. ?Please submit                           



                          a new specimen for                           



                          repeat testing if                           



                          clinically indicated.                           

 

             Lab Interpretation Abnormal                               



             (test code = 91229-4)                                        



[facility] [id number] 19:50:52





             Test Item    Value        Reference Range Interpretation Comments

 

             Nil (test code =              IU/mL                     



             62430-3)                                            

 

             TB1 minus Nil (test              IU/mL                     



             code = 64241-4)                                        

 

             TB2 minus Nil (test              IU/mL                     



             code = 3784233918)                                        

 

             Mitogen minus Nil              IU/mL                     



             (test code =                                        



             98277-8)                                            

 

             QFT Gold Plus Negative     Negative                  



             Result (test code =                                        



             14239-4)                                            

 

             SKYLER (test code = The QuantiFERON? TB Gold                          

 



             SKYLER)         Plus (in Tube) assay is                           



                          intended for use as an aid                           



                          in diagnosis of TB                           



                          infection. A qualitative                           



                          result (i.e., Negative,                           



                          Positive, or                           



                          Indeterminate) is based on                           



                          interpretation of the four                           



                          values, Nil, TB1 minus                           



                          Nil, TB2 minus Nil, and                           



                          Mitogen minus Nil. ?The                           



                          Nil value represents                           



                          nonspecific reactivity                           



                          produced by the patient                           



                          specimen. ?The TB1 minus                           



                          Nil value indicates the                           



                          interferon-gamma response                           



                          of CD4+ T lymphocytes,                           



                          specifically stimulated by                           



                          the TB1 antigens. ?The TB2                           



                          minus Nil value indicates                           



                          interferon-gamma response                           



                          of both CD4+ and CD8+ T                           



                          lymphocytes, stimulated by                           



                          TB2 antigens. ?The Mitogen                           



                          minus Nil value serves as                           



                          the positive control,                           



                          demonstrating the                           



                          successful responsiveness                           



                          of the T lymphocytes in                           



                          patient specimen. A                           



                          negative result suggests                           



                          that M. tuberculosis                           



                          infection is unlikely.                           



                          ?However, in patients with                           



                          high suspicion of                           



                          exposure, a negative test                           



                          should be repeated on a                           



                          new sample. A positive                           



                          result indicates an                           



                          interferon-gamma response                           



                          to M. tuberculosis                           



                          antigens, suggesting                           



                          infection with M.                           



                          tuberculosis. Positive                           



                          results in patients at                           



                          low-risk for tuberculosis                           



                          should be interpreted with                           



                          caution and repeat testing                           



                          on a new sample is                           



                          advised. ?If repeat                           



                          testing is positive,                           



                          treatment may be                           



                          indicated. ?Consult                           



                          Infectious Disease                           



                          Services for further                           



                          recommendations. False                           



                          positive results may occur                           



                          in patients with prior                           



                          infection with M. marinum,                           



                          M. szulgai, or M.                           



                          k[place]ii. For an                           



                          indeterminate result, the                           



                          likelihood of determining                           



                          infection with M.                           



                          tuberculosis cannot be                           



                          determined. ?If clinically                           



                          indicated, repeat testing                           



                          on a new sample is                           



                          advised. For further                           



                          information, refer to                           



                          http://www.cdc.gov/mmwr/pd                           



                          f/rr/kd3715.pdf and                           



                          https://doi.org/10.1093/ci                           



                          d/vss872.                              



[facility] (02215)2022 
13:01:28





             Test Item    Value        Reference Range Interpretation Comments

 

             NA (test code = 135 mmol/L   135-145                   



             1225104169)                                         

 

             K (test code = 4.4 mmol/L   3.5-5.0                   



             7496265096)                                         

 

             CL (test code = 102 mmol/L                       



             0975258834)                                         

 

             CO2 TOTAL (test code = 29 mmol/L    23-31                     



             9192800059)                                         

 

             AGAP (test code =              2-16                      



             0627194955)                                         

 

             BUN (test code = 10 mg/dL     7-23                      



             0260059924)                                         

 

             GLUCOSE (test code = 86 mg/dL                         



             2534259494)                                         

 

             CREATININE (test code = 0.67 mg/dL   0.60-1.25                 



             1908818933)                                         

 

             TOTAL BILI (test code = 2.1 mg/dL    0.1-1.1      H            



             3374545360)                                         

 

             CALCIUM (test code = 8.2 mg/dL    8.6-10.6     L            



             7204507750)                                         

 

             T PROTEIN (test code = 7.4 g/dL     6.3-8.2                   



             9354394953)                                         

 

             ALBUMIN (test code = 3.2 g/dL     3.5-5.0      L            



             8720693391)                                         

 

             ALK PHOS (test code = 443 U/L             H            



             4697170974)                                         

 

             ALTv (test code = 120 U/L      5-50         H            



             1742-6)                                             

 

             AST(SGOT) (test code = 107 U/L      13-40        H            



             [id number])                                         

 

             eGFR (test code =              mL/min/1.73m2              



             1856198339)                                         

 

             SKYLER (test code = SKYLER) Association of                           



                          Glomerular Filtration                           



                          Rate (GFR) and Staging                           



                          of Kidney Disease*                           



                          +---------------------                           



                          --+-------------------                           



                          --+-------------------                           



                          ------+| GFR                           



                          (mL/min/1.73 m2) ?|                           



                          With Kidney Damage ?|                           



                          ?Without Kidney                           



                          Damage+---------------                           



                          --------+-------------                           



                          --------+-------------                           



                          ------------+| ?>90 ?                           



                          ? ? ? ? ? ? ? ?|                           



                          ?Stage one ? ? ? ? ?|                           



                          ? Normal ? ? ? ? ? ? ?                           



                          ?+--------------------                           



                          ---+------------------                           



                          ---+------------------                           



                          -------+| ?60-89 ? ? ?                           



                          ? ? ? ? ?| ?Stage two                           



                          ? ? ? ? ?| ? Decreased                           



                          GFR ? ? ? ?                            



                          +---------------------                           



                          --+-------------------                           



                          --+-------------------                           



                          ------+| ?30-59 ? ? ?                           



                          ? ? ? ? ?| ?Stage                           



                          three ? ? ? ?| ? Stage                           



                          three ? ? ? ? ?                           



                          +---------------------                           



                          --+-------------------                           



                          --+-------------------                           



                          ------+| ?15-29 ? ? ?                           



                          ? ? ? ? ?| ?Stage four                           



                          ? ? ? ? | ? Stage four                           



                          ? ? ? ? ?                              



                          ?+--------------------                           



                          ---+------------------                           



                          ---+------------------                           



                          -------+| ?<15 (or                           



                          dialysis) ? ?| ?Stage                           



                          five ? ? ? ? | ? Stage                           



                          five ? ? ? ? ?                           



                          ?+--------------------                           



                          ---+------------------                           



                          ---+------------------                           



                          -------+ *Each stage                           



                          assumes the associated                           



                          GFR level has been in                           



                          effect for at least                           



                          three months. ?Stages                           



                          1 to 5, with or                           



                          without kidney                           



                          disease, indicate                           



                          chronic kidney                           



                          disease. Notes:                           



                          Determination of                           



                          stages one and two                           



                          (with eGFR                             



                          >59mL/min/1.73 m2)                           



                          requires estimation of                           



                          kidney damage for at                           



                          least three months as                           



                          defined by structural                           



                          or functional                           



                          abnormalities of the                           



                          kidney, manifested by                           



                          either:Pathological                           



                          abnormalities or                           



                          Markers of kidney                           



                          damage (including                           



                          abnormalities in the                           



                          composition of the                           



                          blood or urine or                           



                          abnormalities in                           



                          imaging tests).                           

 

             Lab Interpretation Abnormal                               



             (test code = 87309-5)                                        



[id number] 04:58:33





             Test Item    Value        Reference Range Interpretation Comments

 

             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

ETIVE INFORMATION:



             (test code = 5325-6)                                        Typhus 

Fever Antibody, IgM



                                                                 ?Less than 1:64

 ......



                                                                 Negative-No sig

nificant



                                                                 level of ? ? ? 

? ? ? ? ? ? ?



                                                                 ? ?IgM antibody

 detected.



                                                                 ?1:64 or greate

r .....



                                                                 Positive-Presen

ce of IgM



                                                                 antibody ? ? ? 

? ? ? ? ? ? ?



                                                                 ? ?detected, wh

ich may



                                                                 indicate a ? ? 

? ? ? ? ? ? ?



                                                                 ? ? ?current or

 recent



                                                                 infection; ? ? 

? ? ? ? ? ? ?



                                                                 ? ? ?however, l

ow levels of



                                                                 IgM  ? ? ? ? ? 

? ? ? ? ? ?



                                                                 ?antibodies may

 occasionally



                                                                 persist ? ? ? ?

 ? ? ? ? ? ?



                                                                 ? ?for more minda

n 12 months



                                                                 ? ? ? ? ? ? ? ?

 ? ? ?



                                                                 ?post-infection

. Antibody



                                                                 reactivity to R

ickettsia



                                                                 typhi antigen s

hould be



                                                                 considered grou

p-reactive



                                                                 for the Typhus 

Fever group,



                                                                 which includes 

Rickettsia



                                                                 prowazekii.  Se

roconversion



                                                                 between acute a

nd



                                                                 convalescent se

ra is



                                                                 considered stro

ng evidence



                                                                 of recent infec

tion. The



                                                                 best evidence i

s a



                                                                 significant telma

nge (fourfold



                                                                 difference in t

iter) on two



                                                                 appropriately t

imed



                                                                 specimens, wher

e both tests



                                                                 are done in the

 same



                                                                 laboratory at t

he same time.



                                                                 Acute-phase spe

cimens are



                                                                 collected durin

g the first



                                                                 week of illness

 and



                                                                 convalescent-ph

ase samples



                                                                 are generally o

btained 2-4



                                                                 weeks after res

olution of



                                                                 illness. Ideall

y these



                                                                 samples should 

be tested



                                                                 simultaneously 

at the same



                                                                 facility. If th

e sample



                                                                 submitted was c

ollected



                                                                 during the actu

e-phase of



                                                                 illness, submit

 a marked



                                                                 convalescent sa

mple within



                                                                 25 days for rosendo

red



                                                                 testing.Perform

ed By: [id number] [place] [address]



                                                                 [id number]

 Director:



                                                                 Lurdes France MD



                                                                 [Automated mess

age] The



                                                                 system which ge

nerated this



                                                                 result transmit

[place] reference



                                                                 range: <1:64. T

he reference



                                                                 range was not u

sed to



                                                                 interpret this 

result as



                                                                 normal/abnormal

.



[facility] NAAT
2022 22:42:32





             Test Item    Value        Reference Range Interpretation Comments

 

             HBV Quantitative NAAT              Not Detected log              



             - log IU/mL (test code              IU/mL                     



             = [id number])                                        

 

             HBV Quantitative NAAT              Not Detected              



             IU/ml (test code =              IU/mL                     



             96275-1)                                            

 

             HBV Quantitative Detected     Not Detected A            



             Interpretation (test                                        



             code = 29610-3)                                        

 

             SKYLER (test code = SKYLER) The Aptima HBV Quant                         

  



                          assay is an                            



                          FDA-approved in vitro                           



                          nucleic acid                           



                          amplification test for                           



                          the quantitation of                           



                          hepatitis B virus                           



                          (HBV) DNA in human                           



                          plasma and serum. ?It                           



                          is intended for use as                           



                          an aid in the                           



                          management of patients                           



                          with chronic HBV                           



                          infections undergoing                           



                          HBV antiviral drug                           



                          therapy. ?It is not                           



                          approved for use as a                           



                          screening test for the                           



                          presence of HBV DNA in                           



                          blood or blood                           



                          products or as a                           



                          diagnostic test to                           



                          confirm the presence                           



                          of HBV infection. The                           



                          quantitative range of                           



                          this assay is 1.00 -                           



                          9.00 log IU/mL or 10 -                           



                          1,000,000,000 IU/mL.                           



                          An interpretation of                           



                          "Not Detected" does                           



                          not rule out the                           



                          presence of inhibitors                           



                          in the patient                           



                          specimen or HBV DNA                           



                          concentration below                           



                          the level of detection                           



                          of the test. ?Care                           



                          should be taken when                           



                          interpreting any                           



                          single viral load                           



                          determination.                           



                          Detected, not                           



                          Quantifiable: HBV DNA                           



                          detected, but at a                           



                          level below 10 IU/mL                           



                          (1.0 log IU/mL). ?HBV                           



                          DNA concentration is                           



                          below the lower limit                           



                          of quantitation of the                           



                          assay. Indeterminate:                           



                          Error indicated in the                           



                          generation of the                           



                          result. ?Please submit                           



                          a new specimen for                           



                          repeat testing if                           



                          clinically indicated.                           

 

             Lab Interpretation Abnormal                               



             (test code = 05306-1)                                        



[facility] METABOLIC PANEL ([id number])2022 
12:29:46





             Test Item    Value        Reference Range Interpretation Comments

 

             NA (test code = 131 mmol/L   135-145      L            



             0188524641)                                         

 

             K (test code = 4.1 mmol/L   3.5-5.0                   



             9808103895)                                         

 

             CL (test code = 98 mmol/L                        



             5518998222)                                         

 

             CO2 TOTAL (test code = 27 mmol/L    23-31                     



             1262729710)                                         

 

             AGAP (test code =              2-16                      



             1722101139)                                         

 

             BUN (test code = 9 mg/dL      7-23                      



             5857207797)                                         

 

             GLUCOSE (test code = 90 mg/dL                         



             7470111033)                                         

 

             CREATININE (test code = 0.79 mg/dL   0.60-1.25                 



             2856244761)                                         

 

             TOTAL BILI (test code = 2.3 mg/dL    0.1-1.1      H            



             8641917793)                                         

 

             CALCIUM (test code = 8.1 mg/dL    8.6-10.6     L            



             3553910287)                                         

 

             T PROTEIN (test code = 7.7 g/dL     6.3-8.2                   



             2671418613)                                         

 

             ALBUMIN (test code = 3.3 g/dL     3.5-5.0      L            



             8777612027)                                         

 

             ALK PHOS (test code = 508 U/L             H            



             1534306057)                                         

 

             ALTv (test code = 138 U/L      5-50         H            



             1742-6)                                             

 

             AST(SGOT) (test code = 121 U/L      13-40        H            



             7357734428)                                         

 

             eGFR (test code =              mL/min/1.73m2              



             4313820193)                                         

 

             SKYLER (test code = SKYLER) Association of                           



                          Glomerular Filtration                           



                          Rate (GFR) and Staging                           



                          of Kidney Disease*                           



                          +---------------------                           



                          --+-------------------                           



                          --+-------------------                           



                          ------+| GFR                           



                          (mL/min/1.73 m2) ?|                           



                          With Kidney Damage ?|                           



                          ?Without Kidney                           



                          Damage+---------------                           



                          --------+-------------                           



                          --------+-------------                           



                          ------------+| ?>90 ?                           



                          ? ? ? ? ? ? ? ?|                           



                          ?Stage one ? ? ? ? ?|                           



                          ? Normal ? ? ? ? ? ? ?                           



                          ?+--------------------                           



                          ---+------------------                           



                          ---+------------------                           



                          -------+| ?60-89 ? ? ?                           



                          ? ? ? ? ?| ?Stage two                           



                          ? ? ? ? ?| ? Decreased                           



                          GFR ? ? ? ?                            



                          +---------------------                           



                          --+-------------------                           



                          --+-------------------                           



                          ------+| ?30-59 ? ? ?                           



                          ? ? ? ? ?| ?Stage                           



                          three ? ? ? ?| ? Stage                           



                          three ? ? ? ? ?                           



                          +---------------------                           



                          --+-------------------                           



                          --+-------------------                           



                          ------+| ?15-29 ? ? ?                           



                          ? ? ? ? ?| ?Stage four                           



                          ? ? ? ? | ? Stage four                           



                          ? ? ? ? ?                              



                          ?+--------------------                           



                          ---+------------------                           



                          ---+------------------                           



                          -------+| ?<15 (or                           



                          dialysis) ? ?| ?Stage                           



                          five ? ? ? ? | ? Stage                           



                          five ? ? ? ? ?                           



                          ?+--------------------                           



                          ---+------------------                           



                          ---+------------------                           



                          -------+ *Each stage                           



                          assumes the associated                           



                          GFR level has been in                           



                          effect for at least                           



                          three months. ?Stages                           



                          1 to 5, with or                           



                          without kidney                           



                          disease, indicate                           



                          chronic kidney                           



                          disease. Notes:                           



                          Determination of                           



                          stages one and two                           



                          (with eGFR                             



                          >59mL/min/1.73 m2)                           



                          requires estimation of                           



                          kidney damage for at                           



                          least three months as                           



                          defined by structural                           



                          or functional                           



                          abnormalities of the                           



                          kidney, manifested by                           



                          either:Pathological                           



                          abnormalities or                           



                          Markers of kidney                           



                          damage (including                           



                          abnormalities in the                           



                          composition of the                           



                          blood or urine or                           



                          abnormalities in                           



                          imaging tests).                           

 

             Lab Interpretation Abnormal                               



             (test code = 73157-5)                                        



[facility] WITH [id number] 12:04:01





             Test Item    Value        Reference Range Interpretation Comments

 

             WBC (test code =              See_Comment                [Automated



             9690-2)                                             message] The sy

stem



                                                                 which generated



                                                                 this result



                                                                 transmitted



                                                                 reference range

:



                                                                 4.20 - 10.70



                                                                 10*3/?L. The



                                                                 reference range

 was



                                                                 not used to



                                                                 interpret this



                                                                 result as



                                                                 normal/abnormal

.

 

             RBC (test code =              See_Comment  L             [Automated



             949-8)                                              message] The sy

stem



                                                                 which generated



                                                                 this result



                                                                 transmitted



                                                                 reference range

:



                                                                 4.26 - 5.52



                                                                 10*6/?L. The



                                                                 reference range

 was



                                                                 not used to



                                                                 interpret this



                                                                 result as



                                                                 normal/abnormal

.

 

             HGB (test code = 9.7 g/dL     12.2-16.4    L            



             718-7)                                              

 

             HCT (test code = 29.2 %       38.4-49.3    L            



             4544-3)                                             

 

             MCV (test code = 89.6 fL      81.7-95.6                 



             787-2)                                              

 

             MCH (test code = 29.8 pg      26.1-32.7                 



             785-6)                                              

 

             MCHC (test code = 33.2 g/dL    31.2-35.0                 



             786-4)                                              

 

             RDW-SD (test code = 55.9 fL      38.5-51.6    H            



             70182-8)                                            

 

             RDW-CV (test code = 17.0 %       12.1-15.4    H            



             788-0)                                              

 

             PLT (test code =              See_Comment  H             [Automated



             777-3)                                              message] The sy

stem



                                                                 which generated



                                                                 this result



                                                                 transmitted



                                                                 reference range

:



                                                                 150 - 328 10*3/

?L.



                                                                 The reference r

leslie



                                                                 was not used to



                                                                 interpret this



                                                                 result as



                                                                 normal/abnormal

.

 

             MPV (test code = 9.5 fL       9.8-13.0     L            



             18598-5)                                            

 

             NRBC/100 WBC (test              See_Comment                [Automat

ed



             code = 2749499446)                                        message] 

The system



                                                                 which generated



                                                                 this result



                                                                 transmitted



                                                                 reference range

:



                                                                 0.0 - 10.0 /100



                                                                 WBCs. The refer

ence



                                                                 range was not u

sed



                                                                 to interpret th

is



                                                                 result as



                                                                 normal/abnormal

.

 

             NRBC x10^3 (test code <0.01        See_Comment                [Auto

mated



             = [id number])                                        message] The s

ystem



                                                                 which generated



                                                                 this result



                                                                 transmitted



                                                                 reference range

:



                                                                 10*3/?L. The



                                                                 reference range

 was



                                                                 not used to



                                                                 interpret this



                                                                 result as



                                                                 normal/abnormal

.

 

             GRAN MAT (NEUT) % 77.6 %                                 



             (test code = 770-8)                                        

 

             IMM GRAN % (test code 0.90 %                                 



             = 7381510117)                                        

 

             LYMPH % (test code = 9.7 %                                  



             736-9)                                              

 

             MONO % (test code = 9.5 %                                  



             5905-5)                                             

 

             EOS % (test code = 1.8 %                                  



             713-8)                                              

 

             BASO % (test code = 0.5 %                                  



             706-2)                                              

 

             GRAN MAT x10^3(ANC) 5.99 10*3/uL 1.99-6.95                 



             (test code =                                        



             2436955956)                                         

 

             IMM GRAN x10^3 (test 0.07 10*3/uL 0.00-0.06    H            



             code = 8116586545)                                        

 

             LYMPH x10^3 (test code 0.75 10*3/uL 1.09-3.23    L            



             = 731-0)                                            

 

             MONO x10^3 (test code 0.73 10*3/uL 0.36-1.02                 



             = 742-7)                                            

 

             EOS x10^3 (test code = 0.14 10*3/uL 0.06-0.53                 



             711-2)                                              

 

             BASO x10^3 (test code 0.04 10*3/uL 0.01-0.09                 



             = 704-7)                                            

 

             Lab Interpretation Abnormal                               



             (test code = 36991-1)                                        



[id number]





             Test Item    Value        Reference Range Interpretation Comments

 

             Specimen Tested Plasma                                 



             (test code =                                        



             0654471871)                                         

 

             CMV PCR - log <2.5         See_Comment                [Automated



             IU/mL (test                                         message] The



             code = 87289-0)                                        system which



                                                                 generated this



                                                                 result



                                                                 transmitted



                                                                 reference range

:



                                                                 <2.5 log IU/mL.



                                                                 The reference



                                                                 range was not



                                                                 used to interpr

et



                                                                 this result as



                                                                 normal/abnormal

.

 

             CMV PCR - IU/mL <300         See_Comment                [Automated



             (test code =                                        message] The



             69320-5)                                            system which



                                                                 generated this



                                                                 result



                                                                 transmitted



                                                                 reference range

:



                                                                 <300 IU/mL. The



                                                                 reference range



                                                                 was not used to



                                                                 interpret this



                                                                 result as



                                                                 normal/abnormal

.

 

             CMV PCR - log <2.7         See_Comment                [Automated



             copies/mL (test                                        message] The



             code = 74075-9)                                        system which



                                                                 generated this



                                                                 result



                                                                 transmitted



                                                                 reference range

:



                                                                 <2.7 log



                                                                 copies/mL. The



                                                                 reference range



                                                                 was not used to



                                                                 interpret this



                                                                 result as



                                                                 normal/abnormal

.

 

             CMV PCR -    <516         See_Comment                [Automated



             copies/mL (test                                        message] The



             code = 34089-5)                                        system which



                                                                 generated this



                                                                 result



                                                                 transmitted



                                                                 reference range

:



                                                                 <516 copies/mL.



                                                                 The reference



                                                                 range was not



                                                                 used to interpr

et



                                                                 this result as



                                                                 normal/abnormal

.

 

             SKYLER (test code Test Information:                           



             = SKYLER)       Cytomegalovirus (CMV)                           



                          DNA, Quantitation. The                           



                          quantitative range of                           



                          this assay is 2.5 -                           



                          6.5 log IU/mL (300 -                           



                          3,000,000 IU/mL) ?or                           



                          2.7 - 6.7 log                           



                          copies/mL (516 -                           



                          5,160,000 copies/mL).                           



                          ?One IU/mL of CMV DNA                           



                          is approximately 1.72                           



                          copies/mL.  ?A                           



                          negative result (less                           



                          than 2.5 log IU/mL or                           



                          less than 300 IU/mL)                           



                          does not rule out the                           



                          presence of PCR                           



                          inhibitors in the                           



                          patient specimen or                           



                          CMV DNA concentrations                           



                          below the level of                           



                          detection by the                           



                          assay. Inhibition may                           



                          also lead to                           



                          underestimation of                           



                          viral quantitation.                           



                          ?The limit of                           



                          quantification for                           



                          this DNA assay is 2.5                           



                          log IU/mL (300 IU/mL)                           



                          or 2.7 log copies/mL                           



                          (516 copies/mL). ?If                           



                          the assay DID NOT                           



                          DETECT the virus, the                           



                          test result will be                           



                          reported as "<2.5 log                           



                          IU/mL (<300 IU/mL)"                           



                          and "<2.7 log                           



                          copies/mL (<516                           



                          copies/mL)." ?If the                           



                          assay DETECTED the                           



                          presence of the virus                           



                          but was not able to                           



                          accurately quantify                           



                          the number of copies,                           



                          the test result will                           



                          be reported as                           



                          "Detected, not                           



                          quantifiable."                           



                          Indeterminate: ?Unable                           



                          to generate a valid                           



                          test result on this                           



                          specimen. ?Please                           



                          submit a new specimen                           



                          for repeat testing if                           



                          clinically indicated.                           



                          This is a                              



                          laboratory-developed                           



                          test using a                           



                           labeled                           



                          ASR (Analyte Specific                           



                          Reagent) as the                           



                          reagent providing the                           



                          specificity of the                           



                          assay. ?This test was                           



                          developed and its                           



                          performance                            



                          characteristics                           



                          determined by [facility]                           



                          Clinical Microbiology                           



                          Laboratory. It has not                           



                          been cleared or                           



                          approved by the [place]                           



                          Food and Drug                           



                          Administration;                           



                          however, the FDA has                           



                          determined that such                           



                          clearance or approval                           



                          is not necessary. This                           



                          test is used for                           



                          clinical purposes. It                           



                          should not be regarded                           



                          as investigational or                           



                          for research. This                           



                          laboratory is                           



                          certified under the                           



                          Clinical Laboratory                           



                          Improvement Amendments                           



                          of 1988 (CLIA-88) as                           



                          qualified to perform                           



                          high complexity                           



                          clinical laboratory                           



                          testing.                               



[id number] 19[facility]59:34





             Test Item    Value        Reference Range Interpretation Comments

 

             HAPTOGLOB (test code = 3936698978) 234 mg/dL           H     

       

 

             Lab Interpretation (test code = Abnormal                           

    



             65268-3)                                            



[id number] 17:58:12





             Test Item    Value        Reference Range Interpretation Comments

 

             FERRITIN (test code = 135.0 ng/mL  18.0-464.0                



             [id number])                                         

 

             SKYLER (test code = SKYLER) Biotin has been                           



                          reported to cause a                           



                          negative bias,                           



                          interpret results                           



                          relative to                            



                          patient's use of                           



                          biotin.                                

 

             Lab Interpretation (test Normal                                 



             code = 55858-9)                                        



[facility]





             Test Item    Value        Reference Range Interpretation Comments

 

             IRON (test code = 6088883595) 50 ug/dL                       

  

 

             Lab Interpretation (test code = Normal                             

    



             23501-3)                                            



[facility]. METABOLIC PANEL (29586)2022 
11:54:31





             Test Item    Value        Reference Range Interpretation Comments

 

             NA (test code = 132 mmol/L   135-145      L            



             [id number])                                         

 

             K (test code = 3.6 mmol/L   3.5-5.0                   



             2831873869)                                         

 

             CL (test code = 101 mmol/L                       



             1254574216)                                         

 

             CO2 TOTAL (test code = 24 mmol/L    23-31                     



             1855030007)                                         

 

             AGAP (test code =              2-16                      



             1714616457)                                         

 

             BUN (test code = 8 mg/dL      7-23                      



             1258631496)                                         

 

             GLUCOSE (test code = 115 mg/dL           H            



             8846484414)                                         

 

             CREATININE (test code = 0.75 mg/dL   0.60-1.25                 



             1007558476)                                         

 

             TOTAL BILI (test code = 1.8 mg/dL    0.1-1.1      H            



             7271032579)                                         

 

             CALCIUM (test code = 7.9 mg/dL    8.6-10.6     L            



             0991307007)                                         

 

             T PROTEIN (test code = 7.2 g/dL     6.3-8.2                   



             6640889537)                                         

 

             ALBUMIN (test code = 3.1 g/dL     3.5-5.0      L            



             0854956391)                                         

 

             ALK PHOS (test code = 471 U/L             H            



             5534415970)                                         

 

             ALTv (test code = 128 U/L      5-50         H            



             1742-6)                                             

 

             AST(SGOT) (test code = 104 U/L      13-40        H            



             0838062356)                                         

 

             eGFR (test code =              mL/min/1.73m2              



             2620666240)                                         

 

             SKYLER (test code = SKYLER) Association of                           



                          Glomerular Filtration                           



                          Rate (GFR) and Staging                           



                          of Kidney Disease*                           



                          +---------------------                           



                          --+-------------------                           



                          --+-------------------                           



                          ------+| GFR                           



                          (mL/min/1.73 m2) ?|                           



                          With Kidney Damage ?|                           



                          ?Without Kidney                           



                          Damage+---------------                           



                          --------+-------------                           



                          --------+-------------                           



                          ------------+| ?>90 ?                           



                          ? ? ? ? ? ? ? ?|                           



                          ?Stage one ? ? ? ? ?|                           



                          ? Normal ? ? ? ? ? ? ?                           



                          ?+--------------------                           



                          ---+------------------                           



                          ---+------------------                           



                          -------+| ?60-89 ? ? ?                           



                          ? ? ? ? ?| ?Stage two                           



                          ? ? ? ? ?| ? Decreased                           



                          GFR ? ? ? ?                            



                          +---------------------                           



                          --+-------------------                           



                          --+-------------------                           



                          ------+| ?30-59 ? ? ?                           



                          ? ? ? ? ?| ?Stage                           



                          three ? ? ? ?| ? Stage                           



                          three ? ? ? ? ?                           



                          +---------------------                           



                          --+-------------------                           



                          --+-------------------                           



                          ------+| ?15-29 ? ? ?                           



                          ? ? ? ? ?| ?Stage four                           



                          ? ? ? ? | ? Stage four                           



                          ? ? ? ? ?                              



                          ?+--------------------                           



                          ---+------------------                           



                          ---+------------------                           



                          -------+| ?<15 (or                           



                          dialysis) ? ?| ?Stage                           



                          five ? ? ? ? | ? Stage                           



                          five ? ? ? ? ?                           



                          ?+--------------------                           



                          ---+------------------                           



                          ---+------------------                           



                          -------+ *Each stage                           



                          assumes the associated                           



                          GFR level has been in                           



                          effect for at least                           



                          three months. ?Stages                           



                          1 to 5, with or                           



                          without kidney                           



                          disease, indicate                           



                          chronic kidney                           



                          disease. Notes:                           



                          Determination of                           



                          stages one and two                           



                          (with eGFR                             



                          >59mL/min/1.73 m2)                           



                          requires estimation of                           



                          kidney damage for at                           



                          least three months as                           



                          defined by structural                           



                          or functional                           



                          abnormalities of the                           



                          kidney, manifested by                           



                          either:Pathological                           



                          abnormalities or                           



                          Markers of kidney                           



                          damage (including                           



                          abnormalities in the                           



                          composition of the                           



                          blood or urine or                           



                          abnormalities in                           



                          imaging tests).                           

 

             Lab Interpretation Abnormal                               



             (test code = 68354-7)                                        



[id number] 
11:54:31





             Test Item    Value        Reference Range Interpretation Comments

 

             BILI CONJ (test code = 0260412996) 0.1 mg/dL    0.0-0.3            

       

 

             BILI UNCON (test code = 5431416919) 0.5 mg/dL    0.1-1.1           

        

 

             Lab Interpretation (test code = Normal                             

    



             40633-6)                                            



[facility] WITH [id number] 11:27:05





             Test Item    Value        Reference Range Interpretation Comments

 

             WBC (test code =              See_Comment                [Automated



             6190-2)                                             message] The sy

stem



                                                                 which generated



                                                                 this result



                                                                 transmitted



                                                                 reference range

:



                                                                 4.20 - 10.70



                                                                 10*3/?L. The



                                                                 reference range

 was



                                                                 not used to



                                                                 interpret this



                                                                 result as



                                                                 normal/abnormal

.

 

             RBC (test code =              See_Comment  L             [Automated



             589-8)                                              message] The sy

stem



                                                                 which generated



                                                                 this result



                                                                 transmitted



                                                                 reference range

:



                                                                 4.26 - 5.52



                                                                 10*6/?L. The



                                                                 reference range

 was



                                                                 not used to



                                                                 interpret this



                                                                 result as



                                                                 normal/abnormal

.

 

             HGB (test code = 9.6 g/dL     12.2-16.4    L            



             718-7)                                              

 

             HCT (test code = 29.3 %       38.4-49.3    L            



             4544-3)                                             

 

             MCV (test code = 89.1 fL      81.7-95.6                 



             787-2)                                              

 

             MCH (test code = 29.2 pg      26.1-32.7                 



             785-6)                                              

 

             MCHC (test code = 32.8 g/dL    31.2-35.0                 



             786-4)                                              

 

             RDW-SD (test code = 55.4 fL      38.5-51.6    H            



             63017-0)                                            

 

             RDW-CV (test code = 17.0 %       12.1-15.4    H            



             788-0)                                              

 

             PLT (test code =              See_Comment  H             [Automated



             777-3)                                              message] The sy

stem



                                                                 which generated



                                                                 this result



                                                                 transmitted



                                                                 reference range

:



                                                                 150 - 328 10*3/

?L.



                                                                 The reference r

leslie



                                                                 was not used to



                                                                 interpret this



                                                                 result as



                                                                 normal/abnormal

.

 

             MPV (test code = 9.3 fL       9.8-13.0     L            



             50339-3)                                            

 

             NRBC/100 WBC (test              See_Comment                [Automat

ed



             code = 0339975392)                                        message] 

The system



                                                                 which generated



                                                                 this result



                                                                 transmitted



                                                                 reference range

:



                                                                 0.0 - 10.0 /100



                                                                 WBCs. The refer

ence



                                                                 range was not u

sed



                                                                 to interpret th

is



                                                                 result as



                                                                 normal/abnormal

.

 

             NRBC x10^3 (test code <0.01        See_Comment                [Auto

mated



             = 8343370270)                                        message] The s

ystem



                                                                 which generated



                                                                 this result



                                                                 transmitted



                                                                 reference range

:



                                                                 10*3/?L. The



                                                                 reference range

 was



                                                                 not used to



                                                                 interpret this



                                                                 result as



                                                                 normal/abnormal

.

 

             GRAN MAT (NEUT) % 66.5 %                                 



             (test code = 770-8)                                        

 

             IMM GRAN % (test code 1.70 %                                 



             = 6818728666)                                        

 

             LYMPH % (test code = 14.9 %                                 



             736-9)                                              

 

             MONO % (test code = 14.3 %                                 



             5905-5)                                             

 

             EOS % (test code = 1.9 %                                  



             713-8)                                              

 

             BASO % (test code = 0.7 %                                  



             706-2)                                              

 

             GRAN MAT x10^3(ANC) 4.99 10*3/uL 1.99-6.95                 



             (test code =                                        



             9683841116)                                         

 

             IMM GRAN x10^3 (test 0.13 10*3/uL 0.00-0.06    H            



             code = 3676370409)                                        

 

             LYMPH x10^3 (test code 1.12 10*3/uL 1.09-3.23                 



             = 731-0)                                            

 

             MONO x10^3 (test code 1.07 10*3/uL 0.36-1.02    H            



             = 742-7)                                            

 

             EOS x10^3 (test code = 0.14 10*3/uL 0.06-0.53                 



             711-2)                                              

 

             BASO x10^3 (test code 0.05 10*3/uL 0.01-0.09                 



             = 704-7)                                            

 

             Lab Interpretation Abnormal                               



             (test code = 79863-1)                                        



[id number]26





             Test Item    Value        Reference Range Interpretation Comments

 

             CRP (test code = 2703328270) 8.4 mg/dL    <0.8         H           

 

 

             Lab Interpretation (test code = Abnormal                           

    



             16274-9)                                            



[facility] [id number] 20:05:03





             Test Item    Value        Reference Range Interpretation Comments

 

             T. pallidum Particle Reactive     Nonreactive  A            



             Agglutination (test code =                                        



             5699561864)                                         

 

             SKYLER (test code = SKYLER) Based on IgG/IgM,                           



                          RPR, and TPPA                           



                          results, probable                           



                          active T. pallidum                           



                          infection. ?                           

 

             Lab Interpretation (test Abnormal                               



             code = 24635-0)                                        



[facility] 19[facility]56[facility]12





             Test Item    Value        Reference Range Interpretation Comments

 

             RPR (Quantitative) (test code = 1:256        Nonreactive  A        

    



             37374-2)                                            

 

             Lab Interpretation (test code = Abnormal                           

    



             37526-7)                                            



[facility] [id number] 19:03:09
LEUKOCYTES ARE UNREMARKABLE.  FEW REACTIVE LYMPHOCYTES IDENTIFIED. MILD 
NORMOCYTIC NORMOCHROMIC ANEMIA. PLATELETS ARE UNREMARKABLE.[facility] Worth[facility] ONLY - SYPHILIS [id number] 16:58:56





             Test Item    Value        Reference Range Interpretation Comments

 

             Syphilis IgG/IgM (test Reactive     Non-reactive A            



             code = 67840-0)                                        

 

             SKYLER (test code = SKYLER)  Non-reactive - No                           



                          serologic evidence                           



                          of T. pallidum                           



                          infection. Cannot                           



                          exclude incubating                           



                          or early syphilis.                           



                          Submit a second                           



                          specimen in 2-4                           



                          weeks if syphilis is                           



                          clinically                             



                          suspected. Equivocal                           



                          - Further testing to                           



                          follow. Reactive -                           



                          Further testing to                           



                          follow.                                

 

             Lab Interpretation (test Abnormal                               



             code = 41268-7)                                        



[facility] [id number] 15:42:39





             Test Item    Value        Reference Range Interpretation Comments

 

             HCV Ab (test code = Positive                               



             54903-3)                                            

 

             HCV                                                 



             Semi-Quantitative                                        



             (test code =                                        



             30241-3)                                            

 

              APRI (test code =                                        



             3637194755)                                         

 

             SKYLER (test code = Positive for HCV antibody                         

  



             SKYLER)         with a high signal to                           



                          cutoff ratio (s/c).                           



                          ?Supplementary test for                           



                          Hepatitis C Virus RNA                           



                          Real-Time PCR is                           



                          recommended if clinically                           



                          indicated. ?If any                           



                          questions, please contact                           



                          Clinical Chemistry                           



                          Director on call at                           



                          [phone].APRI score <                           



                          0.5: Suggestive of little                           



                          to no fibrosisAPRI score >                           



                          1.5: Suggestive of                           



                          moderate to severe                           



                          fibrosisAPRI score > 2.0:                           



                          Highly suggestive of                           



                          cirrhosis.                             



[facility] WITH [id number] 14:43:40





             Test Item    Value        Reference Range Interpretation Comments

 

             WBC (test code =              See_Comment                [Automated



             6690-2)                                             message] The sy

stem



                                                                 which generated



                                                                 this result



                                                                 transmitted



                                                                 reference range

:



                                                                 4.20 - 10.70



                                                                 10*3/?L. The



                                                                 reference range

 was



                                                                 not used to



                                                                 interpret this



                                                                 result as



                                                                 normal/abnormal

.

 

             RBC (test code =              See_Comment  L             [Automated



             789-8)                                              message] The sy

stem



                                                                 which generated



                                                                 this result



                                                                 transmitted



                                                                 reference range

:



                                                                 4.26 - 5.52



                                                                 10*6/?L. The



                                                                 reference range

 was



                                                                 not used to



                                                                 interpret this



                                                                 result as



                                                                 normal/abnormal

.

 

             HGB (test code = 11.8 g/dL    12.2-16.4    L            



             718-7)                                              

 

             HCT (test code = 34.7 %       38.4-49.3    L            



             4544-3)                                             

 

             MCV (test code = 89.0 fL      81.7-95.6                 



             787-2)                                              

 

             MCH (test code = 30.3 pg      26.1-32.7                 



             785-6)                                              

 

             MCHC (test code = 34.0 g/dL    31.2-35.0                 



             786-4)                                              

 

             RDW-SD (test code = 57.9 fL      38.5-51.6    H            



             12463-8)                                            

 

             RDW-CV (test code = 18.0 %       12.1-15.4    H            



             788-0)                                              

 

             PLT (test code =              See_Comment                [Automated



             777-3)                                              message] The sy

stem



                                                                 which generated



                                                                 this result



                                                                 transmitted



                                                                 reference range

:



                                                                 150 - 328 10*3/

?L.



                                                                 The reference r

leslie



                                                                 was not used to



                                                                 interpret this



                                                                 result as



                                                                 normal/abnormal

.

 

             MPV (test code = 10.8 fL      9.8-13.0                  



             47125-3)                                            

 

             NRBC/100 WBC (test              See_Comment                [Automat

ed



             code = 4432218944)                                        message] 

The system



                                                                 which generated



                                                                 this result



                                                                 transmitted



                                                                 reference range

:



                                                                 0.0 - 10.0 /100



                                                                 WBCs. The refer

ence



                                                                 range was not u

sed



                                                                 to interpret th

is



                                                                 result as



                                                                 normal/abnormal

.

 

             NRBC x10^3 (test code <0.01        See_Comment                [Auto

mated



             = 8743125546)                                        message] The s

ystem



                                                                 which generated



                                                                 this result



                                                                 transmitted



                                                                 reference range

:



                                                                 10*3/?L. The



                                                                 reference range

 was



                                                                 not used to



                                                                 interpret this



                                                                 result as



                                                                 normal/abnormal

.

 

             GRAN MAT (NEUT) % 68.8 %                                 



             (test code = 770-8)                                        

 

             IMM GRAN % (test code 1.20 %                                 



             = 0190232036)                                        

 

             LYMPH % (test code = 16.6 %                                 



             736-9)                                              

 

             MONO % (test code = 11.0 %                                 



             5905-5)                                             

 

             EOS % (test code = 1.6 %                                  



             713-8)                                              

 

             BASO % (test code = 0.8 %                                  



             706-2)                                              

 

             GRAN MAT x10^3(ANC) 5.06 10*3/uL 1.99-6.95                 



             (test code =                                        



             3826448269)                                         

 

             IMM GRAN x10^3 (test 0.09 10*3/uL 0.00-0.06    H            



             code = 2282596277)                                        

 

             LYMPH x10^3 (test code 1.22 10*3/uL 1.09-3.23                 



             = 731-0)                                            

 

             MONO x10^3 (test code 0.81 10*3/uL 0.36-1.02                 



             = 742-7)                                            

 

             EOS x10^3 (test code = 0.12 10*3/uL 0.06-0.53                 



             711-2)                                              

 

             BASO x10^3 (test code 0.06 10*3/uL 0.01-0.09                 



             = 704-7)                                            

 

             Lab Interpretation Abnormal                               



             (test code = 56004-5)                                        



[facility] [id number] 
11:33:57





             Test Item    Value        Reference Range Interpretation Comments

 

             HBsAB (test code = Negative                               



             9409891645)                                         

 

             HBsAb                     mIU/mL                    



             Semi-Quantitative                                        



             (test code =                                        



             8107393602)                                         

 

             SKYLER (test code = Interpretation:                           



             SKYLER)         ?Hepatitis B Surface                           



                          Antibody ? ?  ? ?                           



                          Negative - Patient is                           



                          considered to be not                           



                          immune to infection with                           



                          HBV. ? ? Positive -                           



                          Anti-HBs detected at                           



                          greater than or equal to                           



                          12 mIU/mL. ?Patient is                           



                          considered to be immune                           



                          to infection with HBV. ?                           



[facility] [id number] 
10:57:13





             Test Item    Value        Reference Range Interpretation Comments

 

             HBCM         Negative                               



             Semi-Quantitative                                        



             (test code =                                        



             40158-6)                                            

 

             SKYLER (test code = Biotin has been reported                          

 



             SKYLER)         to cause a negative bias,                           



                          interpret results                           



                          relative to patient's use                           



                          of biotin.                             



[facility] [id number] 
10:57:13





             Test Item    Value        Reference Range Interpretation Comments

 

             HAVM         Negative                               



             Semi-Quantitative                                        



             (test code =                                        



             19356-9)                                            

 

             SKYLER (test code = HAVAb IgM Interpretative                          

 



             SKYLER)         Information: Reactive                           



                          greater than or equal to                           



                          1.2 Biotin has been                           



                          reported to cause a                           



                          negative bias, interpret                           



                          results relative to                           



                          patient's use of biotin.                           



[facility] B SURFACE [id number] 10:52:05





             Test Item    Value        Reference Range Interpretation Comments

 

             HBsAg Semi-Quantitative (test code = Positive     Negative     A   

         



             5195-3)                                             

 

             Lab Interpretation (test code = Abnormal                           

    



             88725-8)                                            



[facility]ED[facility] [id number] 10:30:25





             Test Item    Value        Reference Range Interpretation Comments

 

             ESR (test code =              See_Comment  H             [Automated

 message]



             4204854826)                                         The system [email or web address]



                                                                 generated this 

result



                                                                 transmitted ref

erence



                                                                 range: 0 - 10 m

m/HR.



                                                                 The reference r

leslie



                                                                 was not used to



                                                                 interpret this 

result



                                                                 as normal/abnor

mal.

 

             Lab Interpretation (test Abnormal                               



             code = 63751-4)                                        



[facility] WITH [id number] 10:27:44





             Test Item    Value        Reference Range Interpretation Comments

 

             HIV          Negative     Negative                  



             Semi-quantitative                                        



             (test code =                                        



             88824-5)                                            

 

             SKYLER (test code = Non-reactive for HIV-1                           



             SKYLER)         antigen and HIV-1/HIV-2                           



                          antibodies. ?No                           



                          laboratory evidence of                           



                          HIV infection. ?Repeat in                           



                          2-4 weeks if acute HIV                           



                          infection is suspected.                           



[facility] (ALB,T.PRO,BILI
T,BU/BC,ALT,AST,ALK PHOS)2022 10:06:02





             Test Item    Value        Reference Range Interpretation Comments

 

             TOTAL BILI (test code = 4111818572) 2.1 mg/dL    0.1-1.1      H    

        

 

             BILI UNCON (test code = 2645065130) 0.5 mg/dL    0.1-1.1           

        

 

             BILI CONJ (test code = 6390641534) 0.4 mg/dL    0.0-0.3      H     

       

 

             T PROTEIN (test code = 3653822841) 7.7 g/dL     6.3-8.2            

       

 

             ALBUMIN (test code = 8832784424) 3.3 g/dL     3.5-5.0      L       

     

 

             ALK PHOS (test code = 2831280048) 544 U/L             H      

      

 

             ALTv (test code = 1742-6) 150 U/L      5-50         H            

 

             AST(SGOT) (test code = 2314136392) 153 U/L      13-40        H     

       

 

             Lab Interpretation (test code = Abnormal                           

    



             47688-7)                                            



[facility] METABOLIC PANEL (NA, K, CL, CO2, 
GLUCOSE, BUN, CREATININE, CA)2022 10:06:02





             Test Item    Value        Reference Range Interpretation Comments

 

             NA (test code = 136 mmol/L   135-145                   



             8544602035)                                         

 

             K (test code = 4.2 mmol/L   3.5-5.0                   



             0612334474)                                         

 

             CL (test code = 106 mmol/L                       



             8657256493)                                         

 

             CO2 TOTAL (test code = 23 mmol/L    23-31                     



             5151674926)                                         

 

             AGAP (test code =              2-16                      



             6481474801)                                         

 

             BUN (test code = 10 mg/dL     7-23                      



             3207237855)                                         

 

             GLUCOSE (test code = 74 mg/dL                         



             9455964390)                                         

 

             CREATININE (test code = 0.78 mg/dL   0.60-1.25                 



             9408034913)                                         

 

             CALCIUM (test code = 7.9 mg/dL    8.6-10.6     L            



             7810159742)                                         

 

             eGFR (test code =              mL/min/1.73m2              



             6079956567)                                         

 

             SKYLER (test code = SKYLER) Association of                           



                          Glomerular Filtration                           



                          Rate (GFR) and Staging                           



                          of Kidney Disease*                           



                          +---------------------                           



                          --+-------------------                           



                          --+-------------------                           



                          ------+| GFR                           



                          (mL/min/1.73 m2) ?|                           



                          With Kidney Damage ?|                           



                          ?Without Kidney                           



                          Damage+---------------                           



                          --------+-------------                           



                          --------+-------------                           



                          ------------+| ?>90 ?                           



                          ? ? ? ? ? ? ? ?|                           



                          ?Stage one ? ? ? ? ?|                           



                          ? Normal ? ? ? ? ? ? ?                           



                          ?+--------------------                           



                          ---+------------------                           



                          ---+------------------                           



                          -------+| ?60-89 ? ? ?                           



                          ? ? ? ? ?| ?Stage two                           



                          ? ? ? ? ?| ? Decreased                           



                          GFR ? ? ? ?                            



                          +---------------------                           



                          --+-------------------                           



                          --+-------------------                           



                          ------+| ?30-59 ? ? ?                           



                          ? ? ? ? ?| ?Stage                           



                          three ? ? ? ?| ? Stage                           



                          three ? ? ? ? ?                           



                          +---------------------                           



                          --+-------------------                           



                          --+-------------------                           



                          ------+| ?15-29 ? ? ?                           



                          ? ? ? ? ?| ?Stage four                           



                          ? ? ? ? | ? Stage four                           



                          ? ? ? ? ?                              



                          ?+--------------------                           



                          ---+------------------                           



                          ---+------------------                           



                          -------+| ?<15 (or                           



                          dialysis) ? ?| ?Stage                           



                          five ? ? ? ? | ? Stage                           



                          five ? ? ? ? ?                           



                          ?+--------------------                           



                          ---+------------------                           



                          ---+------------------                           



                          -------+ *Each stage                           



                          assumes the associated                           



                          GFR level has been in                           



                          effect for at least                           



                          three months. ?Stages                           



                          1 to 5, with or                           



                          without kidney                           



                          disease, indicate                           



                          chronic kidney                           



                          disease. Notes:                           



                          Determination of                           



                          stages one and two                           



                          (with eGFR                             



                          >59mL/min/1.73 m2)                           



                          requires estimation of                           



                          kidney damage for at                           



                          least three months as                           



                          defined by structural                           



                          or functional                           



                          abnormalities of the                           



                          kidney, manifested by                           



                          either:Pathological                           



                          abnormalities or                           



                          Markers of kidney                           



                          damage (including                           



                          abnormalities in the                           



                          composition of the                           



                          blood or urine or                           



                          abnormalities in                           



                          imaging tests).                           

 

             Lab Interpretation Abnormal                               



             (test code = 36229-9)                                        



[facility] Worth[facility]-[id number] 09:54:02





             Test Item    Value        Reference    Interpretation Comments



                                       Range                     

 

             D-DIMER (test code =              See_Comment  H             [Autom

ated



             8298004162)                                         message] The



                                                                 system which



                                                                 generated this



                                                                 result



                                                                 transmitted



                                                                 reference range

:



                                                                 <0.50 ?g/mL



                                                                 (FEU). The



                                                                 reference range



                                                                 was not used to



                                                                 interpret this



                                                                 result as



                                                                 normal/abnormal

.

 

             SKYLER (test code = This test may be                           



             SKYLER)         used in conjunction                           



                          with a clinical                           



                          pretest probability                           



                          (PTP) assessment                           



                          model to exclude                           



                          venous                                 



                          thromboembolism                           



                          (VTE) in patients                           



                          suspected of deep                           



                          venous thrombosis                           



                          (DVT) and pulmonary                           



                          embolism (PE)  A                           



                          D-Dimer value less                           



                          than 0.50 ?g/ml                           



                          (FEU) has a negative                           



                          predicative value of                           



                          96 to 100% (95%                           



                          CI)and 97 to 100%                           



                          (95% CI) as an aid                           



                          in the diagnosis of                           



                          deep vein thrombosis                           



                          (DVT) and pulmonary                           



                          embolism when there                           



                          is low or moderate                           



                          pretest probability                           



                          of PE or DVT.                           



                          D-Dimer values are                           



                          expressed in initial                           



                          fibrinogen                             



                          equivalent units                           



                          (FEU)" The assay                           



                          results should be                           



                          used with other                           



                          information,                           



                          including the                           



                          clinical context, in                           



                          forming a diagnosis.                           



                                                                 

 

             Lab Interpretation Abnormal                               



             (test code =                                        



             93859-1)                                            



[id number]





             Test Item    Value        Reference Range Interpretation Comments

 

             CK (test code = 6382829559) <20                 L            

 

             Lab Interpretation (test code = Abnormal                           

    



             02151-9)                                            



[facility] [id number] 08:58:33





             Test Item    Value        Reference Range Interpretation Comments

 

             TSH (test code =              See_Comment                [Automated

 message]



             5470408320)                                         The system [employer]



                                                                 generated this 

result



                                                                 transmitted ref

erence



                                                                 range: 0.45 - 4

.70



                                                                 mIU/L. The refe

rence



                                                                 range was not u

sed to



                                                                 interpret this 

result



                                                                 as normal/abnor

mal.

 

             Lab Interpretation (test Normal                                 



             code = 35120-7)                                        



[id number] 08:37:09





             Test Item    Value        Reference Range Interpretation Comments

 

             NT-proBNP (test code 373 pg/mL    See_Comment  H             [Autom

ated



             = 6977282128)                                        message] The



                                                                 system which



                                                                 generated this



                                                                 result



                                                                 transmitted



                                                                 reference range

:



                                                                 <=125. The



                                                                 reference range



                                                                 was not used to



                                                                 interpret this



                                                                 result as



                                                                 normal/abnormal

.

 

             SKYLER (test code = SKYLER) Biotin has been                           



                          reported to                            



                          cause a negative                           



                          bias, interpret                           



                          results relative                           



                          to patient's use                           



                          of biotin.                             

 

             Lab Interpretation Abnormal                               



             (test code = 46666-7)                                        



[facility]12





             Test Item    Value        Reference Range Interpretation Comments

 

             ALCOHOL (test code = <10          mg/dL                     



             5369065737)                                         

 

             SKYLER (test code = Toxic Greater than or                           



             SKYLER)         equal to 80 mg/dL. NOTE:                           



                          Whole blood values are                           



                          approximately 10% to 15%                           



                          lower than serum and                           



                          plasma.                                



[facility]:02





             Test Item    Value        Reference Range Interpretation Comments

 

             ACETAMINOP (test code = <10.0        10.0-30.0    L            



             3651653435)                                         

 

             SKYLER (test code = SKYLER) Toxic: Greater than                          

 



                          200 ug/mL @ 4 hour                           



                          post ingestion or                           



                          greater than 50                           



                          ug/mL @ 12 hour post                           



                          ingestion                              

 

             Lab Interpretation (test Abnormal                               



             code = 04738-5)                                        



[facility]35





             Test Item    Value        Reference Range Interpretation Comments

 

             LIPASE (test code = 7557217064) 160 U/L      0-220                 

    

 

             Lab Interpretation (test code = Normal                             

    



             18028-3)                                            



[facility] [id number] 02:03:45





             Test Item    Value        Reference Range Interpretation Comments

 

             EBV Mononucleosis Screen (test code Negative     Negative          

        



             = 0922217135)                                        

 

             Lab Interpretation (test code = Normal                             

    



             11451-8)                                            



[id number] 23:56:57





             Test Item    Value        Reference    Interpretation Comments



                                       Range                     

 

             TROPONIN I (test 0.001 ng/mL  See_Comment                [Automated



             code = 5124432379)                                        message] 

The



                                                                 system which



                                                                 generated this



                                                                 result



                                                                 transmitted



                                                                 reference range

:



                                                                 <=0.034. The



                                                                 reference range



                                                                 was not used to



                                                                 interpret this



                                                                 result as



                                                                 normal/abnormal

.

 

             SKYLER (test code = Reference (Normal)                           



             SKYLER)         Range (defined by                           



                          the 99th percentile                           



                          reference limit): <=                           



                          0.034 ng/mL Note:                           



                          Cardiac troponin                           



                          begins to rise 3-4                           



                          hours after the                           



                          onset of ischemia.                           



                          Repeat in 4-6 hours                           



                          if the sample was                           



                          drawn within 3-4                           



                          hours of the onset                           



                          of the symptom and                           



                          found normal.                           



                          Diagnosis of                           



                          myocardial injury is                           



                          made with acute                           



                          changes in cTn                           



                          concentrations with                           



                          at least one serial                           



                          sample above the                           



                          99th percentile                           



                          upper reference                           



                          limit (URL), taken                           



                          together with the                           



                          patient's clinical                           



                          presentation.                           



                          Biotin has been                           



                          reported to cause a                           



                          negative bias,                           



                          interpret results                           



                          relative to                            



                          patient's use of                           



                          biotin.                                

 

             Lab Interpretation Normal                                 



             (test code =                                        



             93039-6)                                            



[facility]. METABOLIC PANEL ([id number])2022 
23:45:32





             Test Item    Value        Reference Range Interpretation Comments

 

             NA (test code = 133 mmol/L   135-145      L            



             0326291351)                                         

 

             K (test code = 4.3 mmol/L   3.5-5.0                   



             5675607385)                                         

 

             CL (test code = 102 mmol/L                       



             8389765026)                                         

 

             CO2 TOTAL (test code = 23 mmol/L    23-31                     



             9389497606)                                         

 

             AGAP (test code =              2-16                      



             4903488140)                                         

 

             BUN (test code = 14 mg/dL     7-23                      



             3685714955)                                         

 

             GLUCOSE (test code = 112 mg/dL           H            



             8200583159)                                         

 

             CREATININE (test code = 0.74 mg/dL   0.60-1.25                 



             9118637239)                                         

 

             TOTAL BILI (test code = 1.2 mg/dL    0.1-1.1      H            



             3493851254)                                         

 

             CALCIUM (test code = 7.9 mg/dL    8.6-10.6     L            



             3465138093)                                         

 

             T PROTEIN (test code = 7.4 g/dL     6.3-8.2                   



             2722189954)                                         

 

             ALBUMIN (test code = 3.2 g/dL     3.5-5.0      L            



             3383782840)                                         

 

             ALK PHOS (test code = 602 U/L             H            



             1380813924)                                         

 

             ALTv (test code = 154 U/L      5-50         H            



             1742-6)                                             

 

             AST(SGOT) (test code = 207 U/L      13-40        H            



             2595405974)                                         

 

             eGFR (test code =              mL/min/1.73m2              



             7682396644)                                         

 

             SKYLER (test code = SKYLER) Association of                           



                          Glomerular Filtration                           



                          Rate (GFR) and Staging                           



                          of Kidney Disease*                           



                          +---------------------                           



                          --+-------------------                           



                          --+-------------------                           



                          ------+| GFR                           



                          (mL/min/1.73 m2) ?|                           



                          With Kidney Damage ?|                           



                          ?Without Kidney                           



                          Damage+---------------                           



                          --------+-------------                           



                          --------+-------------                           



                          ------------+| ?>90 ?                           



                          ? ? ? ? ? ? ? ?|                           



                          ?Stage one ? ? ? ? ?|                           



                          ? Normal ? ? ? ? ? ? ?                           



                          ?+--------------------                           



                          ---+------------------                           



                          ---+------------------                           



                          -------+| ?60-89 ? ? ?                           



                          ? ? ? ? ?| ?Stage two                           



                          ? ? ? ? ?| ? Decreased                           



                          GFR ? ? ? ?                            



                          +---------------------                           



                          --+-------------------                           



                          --+-------------------                           



                          ------+| ?30-59 ? ? ?                           



                          ? ? ? ? ?| ?Stage                           



                          three ? ? ? ?| ? Stage                           



                          three ? ? ? ? ?                           



                          +---------------------                           



                          --+-------------------                           



                          --+-------------------                           



                          ------+| ?15-29 ? ? ?                           



                          ? ? ? ? ?| ?Stage four                           



                          ? ? ? ? | ? Stage four                           



                          ? ? ? ? ?                              



                          ?+--------------------                           



                          ---+------------------                           



                          ---+------------------                           



                          -------+| ?<15 (or                           



                          dialysis) ? ?| ?Stage                           



                          five ? ? ? ? | ? Stage                           



                          five ? ? ? ? ?                           



                          ?+--------------------                           



                          ---+------------------                           



                          ---+------------------                           



                          -------+ *Each stage                           



                          assumes the associated                           



                          GFR level has been in                           



                          effect for at least                           



                          three months. ?Stages                           



                          1 to 5, with or                           



                          without kidney                           



                          disease, indicate                           



                          chronic kidney                           



                          disease. Notes:                           



                          Determination of                           



                          stages one and two                           



                          (with eGFR                             



                          >59mL/min/1.73 m2)                           



                          requires estimation of                           



                          kidney damage for at                           



                          least three months as                           



                          defined by structural                           



                          or functional                           



                          abnormalities of the                           



                          kidney, manifested by                           



                          either:Pathological                           



                          abnormalities or                           



                          Markers of kidney                           



                          damage (including                           



                          abnormalities in the                           



                          composition of the                           



                          blood or urine or                           



                          abnormalities in                           



                          imaging tests).                           

 

             Lab Interpretation Abnormal                               



             (test code = 87871-9)                                        



[facility] WITH [id number] 23:28:46





             Test Item    Value        Reference Range Interpretation Comments

 

             WBC (test code =              See_Comment                [Automated



             6690-2)                                             message] The sy

stem



                                                                 which generated



                                                                 this result



                                                                 transmitted



                                                                 reference range

:



                                                                 4.20 - 10.70



                                                                 10*3/?L. The



                                                                 reference range

 was



                                                                 not used to



                                                                 interpret this



                                                                 result as



                                                                 normal/abnormal

.

 

             RBC (test code =              See_Comment  L             [Automated



             789-8)                                              message] The sy

stem



                                                                 which generated



                                                                 this result



                                                                 transmitted



                                                                 reference range

:



                                                                 4.26 - 5.52



                                                                 10*6/?L. The



                                                                 reference range

 was



                                                                 not used to



                                                                 interpret this



                                                                 result as



                                                                 normal/abnormal

.

 

             HGB (test code = 11.3 g/dL    12.2-16.4    L            



             718-7)                                              

 

             HCT (test code = 34.0 %       38.4-49.3    L            



             4544-3)                                             

 

             MCV (test code = 90.9 fL      81.7-95.6                 



             787-2)                                              

 

             MCH (test code = 30.2 pg      26.1-32.7                 



             785-6)                                              

 

             MCHC (test code = 33.2 g/dL    31.2-35.0                 



             786-4)                                              

 

             RDW-SD (test code = 59.5 fL      38.5-51.6    H            



             84477-1)                                            

 

             RDW-CV (test code = 17.9 %       12.1-15.4    H            



             788-0)                                              

 

             PLT (test code =              See_Comment  H             [Automated



             777-3)                                              message] The sy

stem



                                                                 which generated



                                                                 this result



                                                                 transmitted



                                                                 reference range

:



                                                                 150 - 328 10*3/

?L.



                                                                 The reference r

leslie



                                                                 was not used to



                                                                 interpret this



                                                                 result as



                                                                 normal/abnormal

.

 

             MPV (test code = 9.8 fL       9.8-13.0                  



             52636-8)                                            

 

             NRBC/100 WBC (test              See_Comment                [Automat

ed



             code = [id number])                                        message] 

The system



                                                                 which generated



                                                                 this result



                                                                 transmitted



                                                                 reference range

:



                                                                 0.0 - 10.0 /100



                                                                 WBCs. The refer

ence



                                                                 range was not u

sed



                                                                 to interpret th

is



                                                                 result as



                                                                 normal/abnormal

.

 

             NRBC x10^3 (test code <0.01        See_Comment                [Auto

mated



             = [id number])                                        message] The s

ystem



                                                                 which generated



                                                                 this result



                                                                 transmitted



                                                                 reference range

:



                                                                 10*3/?L. The



                                                                 reference range

 was



                                                                 not used to



                                                                 interpret this



                                                                 result as



                                                                 normal/abnormal

.

 

             GRAN MAT (NEUT) % 74.8 %                                 



             (test code = 770-8)                                        

 

             IMM GRAN % (test code 1.20 %                                 



             = [id number])                                        

 

             LYMPH % (test code = 13.3 %                                 



             736-9)                                              

 

             MONO % (test code = 8.4 %                                  



             5905-5)                                             

 

             EOS % (test code = 1.5 %                                  



             713-8)                                              

 

             BASO % (test code = 0.8 %                                  



             706-2)                                              

 

             GRAN MAT x10^3(ANC) 5.58 10*3/uL 1.99-6.95                 



             (test code =                                        



             [id number])                                         

 

             IMM GRAN x10^3 (test 0.09 10*3/uL 0.00-0.06    H            



             code = [id number])                                        

 

             LYMPH x10^3 (test code 0.99 10*3/uL 1.09-3.23    L            



             = 731-0)                                            

 

             MONO x10^3 (test code 0.63 10*3/uL 0.36-1.02                 



             = 742-7)                                            

 

             EOS x10^3 (test code = 0.11 10*3/uL 0.06-0.53                 



             711-2)                                              

 

             BASO x10^3 (test code 0.06 10*3/uL 0.01-0.09                 



             = 704-7)                                            

 

             Lab Interpretation Abnormal                               



             (test code = 53337-0)                                        



[facility]
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
## 2022-04-19 ENCOUNTER — HOSPITAL ENCOUNTER (EMERGENCY)
Dept: HOSPITAL 97 - ER | Age: 32
Discharge: HOME | End: 2022-04-19
Payer: SELF-PAY

## 2022-04-19 VITALS — OXYGEN SATURATION: 99 % | DIASTOLIC BLOOD PRESSURE: 75 MMHG | SYSTOLIC BLOOD PRESSURE: 127 MMHG

## 2022-04-19 VITALS — TEMPERATURE: 98.9 F

## 2022-04-19 DIAGNOSIS — F17.210: ICD-10-CM

## 2022-04-19 DIAGNOSIS — I80.8: Primary | ICD-10-CM

## 2022-04-19 LAB
BUN BLD-MCNC: 7 MG/DL (ref 7–18)
GLUCOSE SERPLBLD-MCNC: 137 MG/DL (ref 74–106)
HCT VFR BLD CALC: 37.7 % (ref 39.6–49)
INR BLD: 1.13
LYMPHOCYTES # SPEC AUTO: 0.4 K/UL (ref 0.7–4.9)
MORPHOLOGY BLD-IMP: (no result)
PMV BLD: 7.8 FL (ref 7.6–11.3)
POTASSIUM SERPL-SCNC: 4.1 MMOL/L (ref 3.5–5.1)
RBC # BLD: 4.25 M/UL (ref 4.33–5.43)

## 2022-04-19 PROCEDURE — 85025 COMPLETE CBC W/AUTO DIFF WBC: CPT

## 2022-04-19 PROCEDURE — 99284 EMERGENCY DEPT VISIT MOD MDM: CPT

## 2022-04-19 PROCEDURE — 80048 BASIC METABOLIC PNL TOTAL CA: CPT

## 2022-04-19 PROCEDURE — 70450 CT HEAD/BRAIN W/O DYE: CPT

## 2022-04-19 PROCEDURE — 36415 COLL VENOUS BLD VENIPUNCTURE: CPT

## 2022-04-19 PROCEDURE — 85610 PROTHROMBIN TIME: CPT

## 2022-04-19 PROCEDURE — 93971 EXTREMITY STUDY: CPT

## 2022-04-19 PROCEDURE — 72125 CT NECK SPINE W/O DYE: CPT

## 2022-04-19 PROCEDURE — 96374 THER/PROPH/DIAG INJ IV PUSH: CPT

## 2022-04-19 PROCEDURE — 96375 TX/PRO/DX INJ NEW DRUG ADDON: CPT

## 2022-04-19 NOTE — XMS REPORT
Continuity of Care Document

                            Created on:2022



Patient:SUSY ALFARO

Sex:Male

:1990

External Reference #:[id number]





Demographics







                          Address                   [address]



                                                    [place] [address]

 

                          Home Phone                [phone]

 

                          Mobile Phone              [phone]

 

                          Email Address             [address]

 

                          Preferred Language        en

 

                          Marital Status            Unknown

 

                          Scientologist Affiliation     Unknown

 

                          Race                      Unknown

 

                          Additional Race(s)        White

 

                          Ethnic Group              Not  or 









Author







                          Organization              [facility]

t

 

                          Address                   [address]



                                                    [place] [address]

 

                          Phone                     [phone]









Support







                Name            Relationship    Address         Phone

 

                Naveen           Sibling         Unavailable     [phone]









Care Team Providers







                    Name                Role                Phone

 

                    Pcp,  Does Not Have A Primary Care Physician [phone]

 

                    Jelani MORAN           Attending Clinician [phone]

 

                    STEFANIE RAMIREZ       Attending Clinician Unavailable

 

                    Sadaf MOYER,  B    Attending Clinician [phone]

 

                    Dalton ARNDT,  C   Attending Clinician [phone]

 

                    Stefanie Ramirez MD    Attending Clinician [phone]

 

                    Kate Wharton MD   Attending Clinician [phone]

 

                    Therapy,  Covid Infusion Attending Clinician Unavailable

 

                    Stan ARNDT,  AYESHA        Attending Clinician [phone]

 

                    AYESHA PIERCE           Attending Clinician Unavailable

 

                    Doctor Unassigned,  Name Attending Clinician Unavailable

 

                    KATE WHARTON      Admitting Clinician Unavailable

 

                    Kate Wharton MD   Admitting Clinician [phone]









Problems







       Condition Condition Condition Status Onset  Resolution Last   Treating Co

mments 

Source



       Name   Details Category        Date   Date   Treatment Clinician        



                                                 Date                 

 

       Lip lesion Lip lesion Disease Active                              U

nivers



                                   1-11                               ity of



                                   00:00:                             [address]

 

       Syphilis, Syphilis, Disease Active                              Uni

vers



       secondary secondary               -07                               ity 

of



                                   00:00:                             [address]

 

       Chronic Chronic Disease Active                              [facility]



       hepatitis hepatitis               -07                               ity 

of



       B virus B virus               00:00:                             [place]



       infection infection               [address] [place] [address]

 

       Acute  Acute  Disease Active                              [facility]



       hyponatrem hyponatrem               -07                               it

y of



       ia     ia                   00:00:                             [address]

 

       Jarisch Jarisch Disease Active                              [facility]



       Herxheimer Herxheimer               1-07                               it

y of



       reaction reaction               00:00:                             [place]



                                   00                                 Medical



                                                                      Branch

 

       Chronic Chronic Disease Active                              [facility]



       hepatitis hepatitis                                              ity 

of



       C without C without               00:00:                             [place]

[place]



       hepatic hepatic                                                Medical



       coma   coma                                                    Branch

 

       Reactive Reactive Disease Active                              Unive

rs



       cervical cervical                                              ity of



       lymphadeno lymphadeno               00:00:                             [facility]

[place]



       linda  linda                                                 Medical



                                                                      Branch







Allergies, Adverse Reactions, Alerts







       Allergy Allergy Status Severity Reaction(s) Onset  Inactive Treating Comm

ents 

Source



       Name   Type                        Date   Date   Clinician        

 

       NO KNOWN Drug   Active                                           [facility]



       ALLERGIE Class                                                   ity of



       S                                                              [facility]







Social History







           Social Habit Start Date Stop Date  Quantity   Comments   Source

 

           Exposure to                       Not sure              [facility]

 [place]



           SARS-CoV-2 (event)                                             Medica

l Branch

 

           Sex Assigned At 1990                       [facility]



           Birth      00:00:00   00:00:00                         [facility]









                Smoking Status  Start Date      Stop Date       Source

 

                Unknown if ever smoked                                 [facility]







Medications







       Ordered Filled Start  Stop   Current Ordering Indication Dosage Frequency

 Signature

                    Comments            Components          Source



     Medication Medication Date Date Medication? Clinician                (SIG) 

          



     Name Name                                                   

 

     bisacodyL       No             10mg      10 mg,           Unive

rs



     (DULCOLAX)                                Rectal,           ity o

f



     suppository      20:45: 20:55                          ONCE, 1           Te

xas



     10 mg      00   :00                           dose, On           Medical



                                                  [facility]



                                                  22 at           



                                                  1445,           



                                                  Routine           

 

     enoxaparin            Yes            40mg      40 mg,           Unive

rs



     (LOVENOX)                                     Subcutaneo           ity 

of



     injection      15:00:                               us, DAILY,           Te

xas



     40 mg      00                                 First dose           Medical



                                                  on [facility]



                                                  22 at           



                                                  0900,           



                                                  Until           



                                                  Discontinu           



                                                  ed,            



                                                  Routine           

 

     acetaminoph      2022- No        4647 1{tbl}      Take 1           U

nivers



     en-codeine                                tablet by           ity

 of



     300-30 mg      00:00: 05:59                          mouth           [place]



     tablet      00   :00                           every 8           Medical



                                                  (eight)           Branch



                                                  hours as           



                                                  needed for           



                                                  Pain           



                                                  (scale           



                                                  7-10) for           



                                                  up to 7           



                                                  days.           



                                                  Indication           



                                                  s: acute           



                                                  pain           

 

     acetaminoph      2022- No        4647 1{tbl}      Take 1           U

nivers



     en-codeine                                tablet by           ity

 of



     300-30 mg      00:00: 05:59                          mouth           [place]



     tablet      00   :00                           every 8           Medical



                                                  (eight)           Branch



                                                  hours as           



                                                  needed for           



                                                  Pain           



                                                  (scale           



                                                  7-10) for           



                                                  up to 7           



                                                  days.           



                                                  Indication           



                                                  s: acute           



                                                  pain           

 

     acyclovir      2022- No        [id number] 400mg      Take 2           

[facility]



     200 mg                                capsules           ity of



     capsule      00:00: 05:59                          by mouth           [place]



               00   :00                           every 8           Medical



                                                  (eight)           Branch



                                                  hours for           



                                                  5 days.           

 

     acyclovir      2022- No        [id number] 400mg      Take 2           

[facility]



     200 mg                                capsules           ity of



     capsule      00:00: 05:59                          by mouth           [place]



               00   :00                           every 8           Medical



                                                  (eight)           Branch



                                                  hours for           



                                                  5 days.           

 

     acyclovir      2022- No        [id number] 400mg      Take 2           

[facility]



     200 mg                                capsules           ity of



     capsule      00:00: 00:00                          by mouth           [place]



               00   :00                           every 8           Medical



                                                  (eight)           Branch



                                                  hours for           



                                                  7 days.           

 

     acetaminoph      -0      Yes            650mg      650 mg,           Un

neeraj



     en        1-10                               Oral,           ity of



     (TYLENOL)      22:01:                               Q8HPRN,           [place]



     tablet 650      04                                 Starting           Medic

al



     mg                                           on Mon           Branch



                                                  1/10/22 at           



                                                  1601,           



                                                  Until           



                                                  Discontinu           



                                                  ed,            



                                                  Routine,           



                                                  Pain           



                                                  (scale           



                                                  1-3)           

 

     HYDROcodone      -0      Yes            1{tbl}      1 tablet,          

 [facility]



     -acetaminop      1-10                               Oral,           ity of



     hen (NORCO      22:00:                               Q6HPRN,           [facility]

s



     5) 5-325 mg      00                                 Starting           Medi

pedro



     tablet 1                                         on 



     tablet                                         1/10/22 at           



                                                  1600,           



                                                  Until           



                                                  Discontinu           



                                                  ed,            



                                                  Routine,           



                                                  Pain           



                                                  (scale           



                                                  4-6), Pain           



                                                  (scale           



                                                  7-10)           

 

     morpHINE      -0      Yes            4mg       4 mg, Slow           Uni

vers



     injection 4      1-10                               IV Push,           ity 

of



     mg        22:00:                               Q4HPRN,           [place]



               00                                 Starting           Medical



                                                  on Mon           Branch



                                                  1/10/22 at           



                                                  1600,           



                                                  Until           



                                                  Discontinu           



                                                  ed,            



                                                  Routine,           



                                                  can give w           



                                                  norco if           



                                                  neccesary           



                                                  for            



                                                  breakthrou           



                                                  gh pain           

 

     glycerin/mi      -0      Yes            225mL      225 mL,           Un

neeraj



     neral oil      1-10                               Rectal,           ity of



     (AGLO      21:58:                               PRN,           [place]



     ENEMA)      57                                 Starting           Medical



     (COMPOUNDED                                         on 



     ) Enem 225                                         1/10/22 at           



     mL                                           1558,           



                                                  Until           



                                                  Discontinu           



                                                  ed,            



                                                  Routine,           



                                                  Constipati           



                                                  on             



                                                  unresolved           



                                                  by oral           



                                                  medication           



                                                  s              

 

     magnesium            Yes            30mL      30 mL,           Univer

s



     hydroxide      -10                               Oral, BID,           ity 

of



     (MILK OF      02:00:                               First dose           [facility]

as



     MAGNESIA)      00                                 on [place]           Medical



     400 mg/5 mL                                         22 at           Bra

[facility]



     suspension                                         2000,           



     30 mL                                         Until           



                                                  Discontinu           



                                                  ed,            



                                                  Routine           

 

     bisacodyL      2022- No             10mg      10 mg,           Unive

rs



     (DULCOLAX)      1-10 01-10                          Oral,           ity of



     tablet 10      00:45: 14:11                          DAILY, 2           [facility]

as



     mg        00   :00                           doses,           Medical



                                                  First dose           Branch



                                                  on Sun           



                                                  22 at           



                                                  1845, Last           



                                                  dose on           



                                                  Mon            



                                                  1/10/22 at           



                                                  0900,           



                                                  Routine           

 

     acyclovir            Yes            400mg      400 mg,           Univ

ers



     (ZOVIRAX)      09                               Oral, Q8H,           ity 

of



     capsule 400      12:00:                               First dose           

[place]



     mg        00                                 on [facility]



                                                  22 at           Branch



                                                  0600,           



                                                  Until           



                                                  Discontinu           



                                                  ed, ASAP           

 

     doxycycline       No             100mg      100 mg,           U

nivers



     hyclate      11                          Oral,           ity of



     (Vibramycin      12:00: 08:08                          Q12HA2,           Te

xas



     ) capsule      00   :27                           First dose           Medi

pedro



     100 mg                                         on [facility] to [facility]



                                                  22 at           



                                                  0600,           



                                                  Until           



                                                  Discontinu           



                                                  ed,            



                                                  ASAP<br>Re           



                                                  ason for           



                                                  Anti-Infec           



                                                  tive:           



                                                  Documented           



                                                  Infection<           



                                                  br>Documen           



                                                  weston            



                                                  Infection           



                                                  Site:           



                                                  Other<br>O           



                                                  ther site:           



                                                  syphillis<           



                                                  br>Duratio           



                                                  n of           



                                                  Therapy:           



                                                  10 days           

 

     nicotine            Yes            1{patch      1 Patch,           Un

neeraj



     (NICODERM)      -09                     }         Topical,           ity o

f



     21 mg/24 hr      01:00:                               Administer           

[place]



     patch 1      00                                 over 24           Medical



     Patch                                         Hours,           Branch



                                                  Q24H,           



                                                  First dose           



                                                  on Sat           



                                                  22 at           



                                                  1900,           



                                                  Until           



                                                  Discontinu           



                                                  ed,            



                                                  Routine           

 

     penicillin      -2022- No             310       3,000,000           U

nivers



     GK 3       01-09                          Units (3           ity of



     million      03:30: 05:37                          Million           [place]



     units in NS      00   :29                           Units), IV           Me

dical



     50 mL IV                                         Piggyback,           Branc

h



     infusion                                         Q4H ABX,           



     (CNR)                                         First dose           



                                                  on Fri           



                                                  22 at           



                                                  2130,           



                                                  Until           



                                                  Discontinu           



                                                  ed,            



                                                  Administer           



                                                  over 60           



                                                  Minutes,           



                                                  50             



                                                  mL<br>Reas           



                                                  on for           



                                                  Anti-Infec           



                                                  tive:           



                                                  Empiric           



                                                  Therapy           



                                                  for            



                                                  Suspected           



                                                  Infection<           



                                                  br>Empiric           



                                                  Therapy           



                                                  Site:           



                                                  Blood<br>D           



                                                  uration of           



                                                  therapy: 7           



                                                  days           

 

     bisacodyL       No             5mg       5 mg,           Univer

s



     (DULCOLAX)                                Oral,           ity of



     tablet 5 mg      02:45: 13:52                          DAILY, 2           [facility]

[facility]



               00   :00                           doses,           Medical



                                                  First dose           [facility]



                                                  on 22 at           



                                                  2045, Last           



                                                  dose on           



                                                  Sat 22           



                                                  at 0900,           



                                                  Routine           

 

     lactated       No             1000mL      at 100           Univ

ers



     ringers IV      -09                          mL/hr,           ity of



     infusion      00:30: 00:29                          1,000 mL,           [facility]

as



     1,000 mL      00   :00                           IV             Medical



                                                  Infusion,           [facility]



                                                  CONTINUOUS           



                                                  , Starting           



                                                  on 22 at           



                                                  1830,           



                                                  Until Sat           



                                                  22 at           



                                                  1829, ASAP           

 

     hydrOXYzine            Yes            25mg      25 mg,           Univ

ers



     (ATARAX)                                     Oral,           ity of



     tablet 25      23:27:                               Q6HPRN,           [place]



     mg        50                                 Starting           Medical



                                                  on Fri           [facility]



                                                  22 at           



                                                  1727,           



                                                  Until           



                                                  Discontinu           



                                                  ed,            



                                                  Routine,           



                                                  Anxiety           

 

     FENTanyl PF       No             50ug      50 mcg,           Un

neeraj



     (SUBLIMAZE                                Slow IV           ity o

f



     (PF))      23:20: 00:14                          Push,           [place]



     injection      00   :00                           ONCE, 1           Medical



     50 mcg                                         dose, On           [facility]



                                                  Fri 22           



                                                  at 1730,           



                                                  Routine           

 

     piperacilli      2022- No             4.5g      4.5 g, IV           

[facility]



     n-tazobacta                                Piggyback,           i

ty of



     m (ZOSYN)      22:45: 23:03                          Q6H ABX,           [facility]

as



     4.5 g in      00   :49                           First dose           Medic

al



     NaCl 0.9%                                         on Fri           [facility]



     (NS) 100 mL                                         22 at           



     MINI-BAG                                         1645,           



                                                  Until           



                                                  Discontinu           



                                                  ed,            



                                                  Administer           



                                                  over 30           



                                                  Minutes,           



                                                  100            



                                                  mL<br>Reas           



                                                  on for           



                                                  Anti-Infec           



                                                  tive:           



                                                  Documented           



                                                  Infection<           



                                                  br>Documen           



                                                  weston            



                                                  Infection           



                                                  Site:           



                                                  Abdominal<           



                                                  br>Duratio           



                                                  n of           



                                                  Therapy: 7           



                                                  days           

 

     sennosides-            Yes            1{tbl}      1 tablet,          

 [facility]



     docusate                                     Oral,           ity of



     sodium      21:45:                               DAILY,           [place]



     (SENOKOT-S)      00                                 First dose           Me

dical



     8.6-50 mg                                         on 



     per tablet                                         22 at           



     1 tablet                                         1545,           



                                                  Until           



                                                  Discontinu           



                                                  ed,            



                                                  Routine           

 

     polyethylen            Yes            17g       17 g,           Unive

rs



     e glycol      07                               Oral, BID,           ity o

f



     3350 powder      21:45:                               First dose           

[place]



     17 g      00                                 on Fri           [facility]



                                                  22 at           [facility]



                                                  1545,           



                                                  Until           



                                                  Discontinu           



                                                  ed,            



                                                  Routine           

 

     morpHINE      2022- No             6mg       6 mg, Slow           Un

neeraj



     injection 6      10                          IV Push,           ity

 of



     mg        21:31: 21:58                          Q3HPRN,           [place]



               05   :01                           Starting           Medical



                                                  on Fri           [facility]



                                                  22 at           



                                                  1531,           



                                                  Until Mon           



                                                  1/10/22 at           



                                                  1558,           



                                                  Routine,           



                                                  Pain           



                                                  (scale           



                                                  4-6), Pain           



                                                  (scale           



                                                  7-10), can           



                                                  give w           



                                                  norco if           



                                                  neccesary           



                                                  for            



                                                  breakthrou           



                                                  gh pain           

 

     ondansetron            Yes            4mg       4 mg, Slow           

[facility]



     (ZOFRAN                                     IV Push,           ity of



     (PF))      21:30:                               Q6HPRN,           [place]



     injection 4      02                                 Starting           Medi

pedro



     mg                                           on Fri           [facility]



                                                  22 at           



                                                  1530,           



                                                  Until           



                                                  Discontinu           



                                                  ed,            



                                                  Routine,           



                                                  Nausea and           



                                                  Vomiting           



                                                  (N/V)           

 

     morpHINE      2022- No                       PRN,           [facility]



     injection                                Starting           ity o

f



               19:16: 21:29                          on Fri           [place]



               10   :46                           22 at           Medical



                                                  1316,           Branch



                                                  Until 22 at           



                                                  1529,           



                                                  Routine           

 

     tc        2022- No        [id number] [facility]      10             [facility]



     [id number]                                millicurie           i

ty of



     amy       16:15: 17:45                          ,              [place]



     injection      00   :00                           Intravenou           Medi

pedro



     10                                           s, ONCE, 1           Branch



     millicurie                                         dose, On           



                                                  Fri 22           



                                                  at 1015,           



                                                  Routine           

 

     penicillin      2022- No             2.410      2.4            Unive

rs



     g                                   [place]           ity of



     benzathine      06:15: 08:32                          Units,           [place]

[place]



     (BICILLIN      00   :00                           Intramuscu           Medi

pedro



     L-A)                                         lar, ONCE,           [facility]



     injection                                         1 dose, On           



     2.4 Million                                         Fri 22           



     Units                                         at 0015,           



                                                  ASAP<br>Re           



                                                  ason for           



                                                  Anti-Infec           



                                                  tive:           



                                                  Documented           



                                                  Infection<           



                                                  br>Documen           



                                                  weston            



                                                  Infection           



                                                  Site:           



                                                  Blood<br>D           



                                                  uration of           



                                                  Therapy:           



                                                  Other (see           



                                                  Comments)           

 

     HYDROcodone      2022- No             1{tbl}      1 tablet,         

  [facility]



     -acetaminop      1-07 01-10                          Oral, Q6H,           i

ty of



     hen (NORCO      02:45: 21:58                          First dose           

[place]



     5) 5-325 mg      00   :01                           on Thu           Medica

l



     tablet 1                                         22 at           [place]



     tablet                                         ,           



                                                  Until           



                                                  Discontinu           



                                                  ed,            



                                                  Routine           

 

     morpHINE      2022- No             5mg       5 mg, Slow           Un

[facility]aj



     injection 5                                IV Push,           ity

 of



     mg        02:32: 21:31                          Q3HPRN,           [place]



               20   :17                           Starting           Medical



                                                  on u           [facility]



                                                  22 at           



                                                  203,           



                                                  Until 22 at           



                                                  1531,           



                                                  Routine,           



                                                  Pain           



                                                  (scale           



                                                  4-6), Pain           



                                                  (scale           



                                                  7-10), can           



                                                  give w           



                                                  norco if           



                                                  neccesary           



                                                  for            



                                                  breakthrou           



                                                  gh pain           

 

     ibuprofen            Yes            600mg      600 mg,           Univ

ers



     (IBU)                                     Oral,           ity of



     tablet 600      02:30:                               Q8HPRN,           [place]

[place]



     mg        45                                 Starting           Medical



                                                  on u           [facility]



                                                  22 at           



                                                  2030,           



                                                  Until           



                                                  Discontinu           



                                                  ed,            



                                                  Routine,           



                                                  Temp >           



                                                  38.5 C           

 

     lidocaine      2022- No             10mL      10 mL,           Unive

rs



     PF 2%                                Subcutaneo           ity of



     (XYLOCAINE-      01:15: 01:15                          us, ONCE           T

[facility]



     MPF)      00   :00                           NOW, 1           Medical



     injection                                         dose, On           [facility]



     10 mL                                         u 22           



                                                  at 1915,           



                                                  Routine           

 

     morpHINE      2022- No             4mg       4 mg, Slow           Un

neeraj



     injection 4      07                          IV Push,           ity

 of



     mg        18:47: 02:31                          Q4HPRN,           [place]



               23   :29                           Starting           Medical



                                                  on u           [facility]



                                                  22 at           



                                                  1247,           



                                                  Until Thu           



                                                  22 at           



                                                  2031,           



                                                  Routine,           



                                                  Pain           



                                                  (scale           



                                                  7-10)           

 

     acetaminoph      2022- No             650mg      650 mg,           U

nivers



     en        1-06 01-10                          Oral,           ity of



     (TYLENOL)      18:47: 22:01                          Q8HPRN,           [place]

[place]



     tablet 650      00   :15                           Starting           Medic

al



     mg                                           on u           [facility]



                                                  22 at           



                                                  1247,           



                                                  Until Mon           



                                                  1/10/22 at           



                                                  1601,           



                                                  Routine,           



                                                  Pain           



                                                  (scale           



                                                  1-3), Temp           



                                                  > 38.5 C           

 

     morpHINE      2022- No             2mg       2 mg, Slow           Un

neeraj



     injection 2                                IV Push,           ity

 of



     mg        14:53: 18:47                          Q4HPRN, [address] [place] [address]



               08   :58                           doses,           Medical



                                                  Starting           [facility]



                                                  on u           



                                                  22 at           



                                                  0853,           



                                                  Until Thu           



                                                  22 at           



                                                  1247,           



                                                  Routine,           



                                                  Pain           



                                                  (scale           



                                                  7-10)           

 

     heparin      2022- No             5000U      5,000           [facility]



     (porcine)      1-06 01-10                          Units,           ity of



     injection      14:00: 21:59                          Subcutaneo           T

[facility]



     5,000 Units      00   :38                           us, Q12H,           Med

ical



                                                  First dose           [facility]



                                                  on u           



                                                  22 at           



                                                  0800,           



                                                  Until           



                                                  Discontinu           



                                                  ed,            



                                                  Routine           

 

     ibuprofen      2022- No             400mg      400 mg,           Uni

vers



     (IBU)                                Oral,           ity of



     tablet 400      10:14: 18:47                          Q6HPRN,           [facility]

as



     mg        06   :58                           Starting           Medical



                                                  on u           [facility]anch



                                                  22 at           



                                                  0414,           



                                                  Until Thu           



                                                  22 at           



                                                  1247,           



                                                  Routine,           



                                                  Pain           



                                                  (scale           



                                                  1-3), Temp           



                                                  > 38.5 C           

 

     ampicillin-      2022- No             3g        3 g, IV           Un

neeraj



     sulbactam                                Piggyback,           ity

 of



     (UNASYN) 3      09:30: 04:58                          Q6H ABX,           [facility]

[facility]



     g in NaCl      00   :28                           First dose           Medi

pedro



     0.9% (NS)                                         on Thu           [facility]anch



     100 mL                                         22 at           



     MINI-BAG                                         0330,           



                                                  Until           



                                                  Discontinu           



                                                  ed,            



                                                  Administer           



                                                  over 30           



                                                  Minutes,           



                                                  100            



                                                  mL<br>Reas           



                                                  on for           



                                                  Anti-Infec           



                                                  tive:           



                                                  Empiric           



                                                  Therapy           



                                                  for            



                                                  Suspected           



                                                  Infection<           



                                                  br>Empiric           



                                                  Therapy           



                                                  Site:           



                                                  HEENT<br>D           



                                                  uration of           



                                                  therapy: 7           



                                                  days           

 

     traMADoL       No             50mg      50 mg,           Univer

s



     (ULTRAM)                                Oral,           ity of



     tablet 50      08:25: 02:31                          [id number],           [place]

s



     mg        50   :29                           Starting           Medical



                                                  on Thu           Branch



                                                  22 at           



                                                  0225,           



                                                  Until Thu           



                                                  22 at           



                                                  2031,           



                                                  Routine,           



                                                  Pain           



                                                  (scale           



                                                  4-6)           

 

     ibuprofen      2022- No             600mg      600 mg,           Uni

vers



     (IBU)                                Oral,           ity of



     tablet 600      04:00: 02:53                          ONCE, 1           Mahesh

as



     mg        00   :00                           dose, On           Medical



                                                  Wed 22           Branch



                                                  at 2200,           



                                                  ASAP           

 

     vancomycin      2022- No             1000mg      1,000 mg,          

 Univers



     (VANCOCIN)                                IV             ity of



     1,000 mg in      03:00: 03:53                          [place]



     NaCl 0.9%      00   :00                           ONCE, 1           Medical



     (NS) 250 mL                                         dose, On           [facility]



     VIAL-MATE                                         Wed 22           



     IV                                           at 2100,           



     piggyback                                         Administer           



                                                  over 60           



                                                  Minutes,           



                                                  250            



                                                  mL<br>Reas           



                                                  on for           



                                                  Anti-Infec           



                                                  tive:           



                                                  Empiric           



                                                  Therapy           



                                                  for            



                                                  Suspected           



                                                  Infection<           



                                                  br>Empiric           



                                                  Therapy           



                                                  Site:           



                                                  Blood<br>D           



                                                  uration of           



                                                  therapy:           



                                                  72 hours           

 

     cefTRIAXone       No             1000mg      1,000 mg,         

  Univers



     (ROCEPHIN)                                IV             ity of



     1,000 mg in      03:00: 03:11                          [place],          

 [place]



     NaCl 0.9%      00   :00                           ONCE, 1           Medical



     (NS) 50 mL                                         dose, On           [facility]

h



     MINI-BAG                                         Wed 22           



                                                  at 2100,           



                                                  Administer           



                                                  over 30           



                                                  Minutes,           



                                                  50             



                                                  mL<br&gt;R           



                                                  gaurav for           



                                                  Anti-Infec           



                                                  tive:           



                                                  Empiric           



                                                  Therapy           



                                                  for            



                                                  Suspected           



                                                  Infection<           



                                                  br>Empiric           



                                                  Therapy           



                                                  Site:           



                                                  Blood<br>D           



                                                  uration of           



                                                  therapy:           



                                                  72 hours           

 

     iopamidol      2022- No        [id number] 100mL      100 mL,         

  Univers



     (ISOVUE                                Intravenou           ity o

f



     370-500 mL)      02:17: 02:18                          s, ONCE, 1          

 [place]



     injection      00   :00                           dose, On           Medica

l



     100 mL                                         22           Branch



                                                  at 2030,           



                                                  Routine           

 

     morpHINE      2022- No             4mg       4 mg, Slow           Un

neeraj



     injection 4                                IV Push,           ity

 of



     mg        02:15: 01:38                          ONCE, 1           [place]



               00   :00                           dose, On           Medical



                                                  22           Branch



                                                  at 2015,           



                                                  STAT           

 

     NaCl 0.9%      2022- No             1000mL      at 999           Uni

vers



     (NS) IV                                mL/hr, IV           ity of



     infusion      01:15: 01:46                          Infusion,           Mahesh

as



     1,000 mL      00   :00                           ONCE, 1           Medical



                                                  dose, On           Branch



                                                  22           



                                                  at 1915,           



                                                  Routine           

 

     NaCl 0.9%      2022- No             1000mL      at 999           Uni

vers



     (NS) bolus      06                          mL/hr,           ity of



     infusion      23:45: 00:14                          1,000 mL,           Mahesh

as



     1,000 mL      00   :00                           IV             Medical



                                                  Infusion,           Branch



                                                  ONCE, 1           



                                                  dose, On           



                                                  22           



                                                  at 1745,           



                                                  ASAP           

 

     ketorolac      2022- No             30mg      30 mg,           Unive

rs



     (TORADOL)                                Slow IV           ity of



     injection      23:26: 23:27                          Push,           [place]



     30 mg      00   :00                           ONCE, 1           Medical



                                                  dose, On           Branch



                                                  22           



                                                  at 1730,           



                                                  ASAP           

 

     casirivimab      2021- No        [id number] 1200mg      1,200 mg,    

       Univers



     -imdevimab                                Subcutaneo           it

y of



     (REGEN-COV      23:45: 22:33                          us, ONCE,           T

exas



     (EUA))      00   :00                           1 dose, On           Medical



     injection                                         Thu            Branch



     (CO-FORMULA                                         21           



     TION) 1,200                                         at 1745,           



     mg                                           Routine           







Vital Signs







             Vital Name   Observation Time Observation Value Comments     Source

 

             Systolic blood 2022 18:00:00 113 mm[Hg]                Univer

sity of



             pressure                                            [facility]

 

             Diastolic blood 2022 18:00:00 69 mm[Hg]                 Unive

rsity of



             pressure                                            [facility]

 

             Heart rate   2022 18:00:00 83 /min                   Universi

ty of



                                                                 [facility]

 

             Body temperature 2022 18:00:00 35.83 Roxana                 Univ

ersity of



                                                                 [facility]

 

             Oxygen saturation in 2022 18:00:00 99 /min                   

University of



             Arterial blood by                                        [place] Medi

pedro



             Pulse oximetry                                        Branch

 

             Respiratory rate 2022 09:55:00 16 /min                   Univ

ersity of



                                                                 [facility]

 

             Body height  2022 07:45:00 170.2 cm                  Universi

ty of



                                                                 [facility]

 

             Body weight  2022 07:45:00 65.318 kg                 Universi

ty of



                                                                 [facility]

 

             BMI          2022 07:45:00 22.55 kg/m2               Universi

ty of



                                                                 [facility]

 

             Systolic blood 2021 23:18:00 131 mm[Hg]                Univer

sity of



             pressure                                            [facility]

 

             Diastolic blood 2021 23:18:00 77 mm[Hg]                 Unive

rsity of



             pressure                                            [facility]

 

             Heart rate   2021 23:18:00 89 /min                   Universi

ty of



                                                                 [facility]

 

             Body temperature 2021 23:18:00 37.22 Roxana                 Univ

ersity of



                                                                 [facility]

 

             Respiratory rate 2021 23:18:00 20 /min                   Univ

ersity of



                                                                 [facility]

 

             Oxygen saturation in 2021 23:18:00 99 /min                   

University of



             Arterial blood by                                        [place] Medi

pedro



             Pulse oximetry                                        Branch

 

             Body height  2021 22:30:00 170.2 cm                  Universi

ty of



                                                                 [facility]

 

             Body weight  2021 22:30:00 61.689 kg                 Universi

ty of



                                                                 [facility]

 

             BMI          2021 22:30:00 21.30 kg/m2               Universi

ty of



                                                                 [facility]







Procedures







                Procedure       Date / Time     Performing Clinician Source



                                Performed                       

 

                XR KUB          2022 08:53:00 Mya Domingo o

[facility]

 

                GC & CHLAMYDIA AMPLIFIED 2022 16:21:00 Mya Domingo

[facility]

 

                GC & CHLAMYDIA AMPLIFIED 2022 16:19:00 DomingoMya  Uni

[facility] [place]



                ASSAY                                           [facility]

 

                COMP. METABOLIC PANEL 2022 12:36:00 Sanamniraj Kyung  [facility]



                ([id number])                                         [facility] [place]

 

                GC & CHLAMYDIA AMPLIFIED 2022 07:24:00 DomingoEldonah  [facility]

 

                HSV 1&2, VZV NAAT 2022 07:24:00 Mya Domingo  [facility]

 

                US ABDOMEN LIMITED 2022 15:45:14 [facility]

 

                COMP. METABOLIC PANEL 2022 11:36:00 [facility]



                ([id number])                         [facility]

 

                CBC WITH DIFF   2022 11:36:00 [facility]

 

                HEPATITIS B VIRUS (HBV) 2022 11:36:00 [facility]



                BY QUANTITATIVE NAAT                 [facility]

 

                XR ABDOMEN 2 VW 2022 01:05:00 [facility]

 

                HB ECG ROUTINE & RHYTHM 2022 23:18:53 [facility]



                STRIP                           [facility]

 

                NM HEPATOBILIARY W 2022 20:00:00 [facility]



                INTERVENTION                    [facility]

 

                FERRITIN SERUM  2022 11:07:00 [facility]

 

                IRON            2022 11:07:00 [facility]

 

                BILI UNCONJUGATED/BILI 2022 11:07:00 [facility]



                CONJUG                          [facility]

 

                COMP. METABOLIC PANEL 2022 11:07:00 [facility]



                ([id number])                         [facility]

 

                CBC WITH DIFF   2022 11:07:00 Brea   [place]

f [facility]

 

                CEREBROSPINAL FLUID 2022 03:31:00 Gurjit Lux [facility]



                PROTEIN                                         [facility]

 

                CEREBROSPINAL FLUID 2022 03:31:00 Gurjit Lux [facility]



                GLUCOSE                                         [facility]

 

                BODY FLUID DIRECT COUNT 2022 03:31:00 Gurjit Lux [facility]

 

                CSF/ SHUNT CULTURE 2022 03:31:00 Gurjit Lux [facility]

 

                FUNGUS (ROUTINE) CULTURE 2022 03:31:00 [facility] [place]

 

                CSF CULTURE     2022 03:31:00 Gurjit Lux [facility]

 

                TRANSTHORACIC ECHO (TTE) 2022 21[facility]37:48 [facility]

 

                CMV BY PCR      2022 15:25:00 Gurjit Lux [facility]

 

                GALV ONLY - SYPHILIS 2022 10:00:00 Gurjit Lux [facility]



                IGG/IGM                                         [facility]

 

                HEPATITIS B SURFACE 2022 10:00:00 Gurjit Lux [facility]



                ANTIBODY                                        [facility]

 

                RPR (QUANTITATIVE) 2022 10:00:00 Gurjit Lux [facility]

 

                MRSA / MSSA SCREEN BY 2022 09:25:00 Gurjit Lux [facility]



                PCR, NARES                                      [facility]

 

                THROAT CULTURE  2022 09:16:00 Gurjit Lux [facility]

 

                HAPTOGLOBIN, SERUM 2022 09:13:00 [facility]

 

                C-REACTIVE PROTEIN 2022 09:13:00 Gurjit Lux [facility]

 

                HEPATIC FUNCTION PANEL 2022 09:13:00 Gurjit Lux [facility]



                ([id number]) (ALB,T.PRO,BILI                                 [facility] 

Branch



                T,BU/BC,ALT,AST,ALK PHOS)                                 

 

                BASIC METABOLIC PANEL 2022 09:13:00 Gurjit Lux [facility]



                (NA, K, CL, CO2, GLUCOSE,                                 Medica

l Branch



                BUN, CREATININE, CA)                                 

 

                SEDIMENTATION RATE 2022 09:13:00 Gurjit Lux [facility]

 

                DIFF CONSULT    2022 09:13:00 [facility]

 

                CBC WITH DIFF   2022 09:13:00 [facility]

 

                D-DIMER         2022 09:13:00 Gurjit Lux [facility]

 

                HEPATITIS B SURFACE 2022 09:13:00 Gurjit Lux [facility]



                ANTIGEN                                         [facility]

 

                HCV ANTIBODY    2022 09:13:00 [facility]

 

                HEPATITIS A VIRUS 2022 09:13:00 Gurjit Lux [facility]



                ANTIBODY IGM                                    [facility]

 

                HEPATITIS B CORE ANTIBODY 2022 09:13:00 Gurjit Lux [facility]



                IGM                                             [facility]

 

                HEPATITIS C VIRUS (HCV) 2022 09:13:00 Gurjit Lux [facility]



                BY QUANTITATIVE NAAT                                 [facility]

 

                QUANTIFERON-TB ASSAY 2022 09:13:00 Gurjit Lux [facility]

 

                TYPHUS FEVER AB, IGM 2022 09:13:00 Gurjit Lux [facility]

 

                URINE DRUG (IMMUNOASSAY) 2022 09:00:00 Gurjit Lux [facility]



                - COMPREHENSIVE DRUG                                 [facility]



                SCREEN                                          

 

                URINE DRUG (LCMSMS) - 2022 09:00:00 Gurjit Lux [facility]



                SYNTHETIC OPIATES PANEL                                 Medical 

Branch

 

                CT ABDOMEN PELVIS W 2022 02:22:48 Abdiel Talavera [facility]



                CONTRAST                                        [facility]

 

                CT SOFT TISSUE NECK W 2022 02:22:48 Abdiel Talavera [facility]



                CONTRAST                                        [facility]

 

                CT CHEST PULMONARY 2022 02:22:48 Abdiel Talavera [facility]



                ANGIOGRAM                                       [facility]

 

                US GALL BLADDER 2022 01:24:40 Abdiel Talavera [facility]

 

                XR CHEST 1 VW   2022 00:32:07 Abdiel Talavera [facility]

 

                URINALYSIS      2022 23:30:00 Abdiel Talavera [facility]

 

                URINE CULTURE   2022 23:30:00 Abdiel Talavera [facility]

 

                HB ECG ROUTINE & RHYTHM 2022 23:28:50 Abdiel Talavera [facility]

 

                ASSIGNMENT OF BENEFITS 2022 23:13:59 Doctor Unassigned, Un

[facility]



                                                [place]         [facility]

 

                LACTIC ACID WHOLE BLOOD 2022 23:08:00 Abdiel Talavera [facility]

 

                BLOOD CULTURE SCREEN 2022 23:07:00 [facility]iel [facility]

[facility]

 

                CREATINE KINASE 2022 23:07:00 Gurjit Lux [facility]

 

                LIPASE          2022 23:07:00 Garry Anne [place]

f [facility]

 

                TROPONIN I      2022 23:07:00 Abdiel Talavera [facility]

 

                THYROID STIMULATING 2022 23:07:00 Gurjit Lux [facility]



                HORMONE                                         [facility]

 

                COMP. METABOLIC PANEL 2022 23:07:00 Abdiel [facility]

[facility]



                ([id number])                                         [facility]

 

                ACETAMINOPHEN   2022 23:07:00 Gurjit Lux [facility]

 

                ETHANOL         2022 23:07:00 Gurjit Lux [facility]

 

                CBC WITH DIFF   2022 23:07:00 Abdiel Talavera [facility]

 

                EBV-MONONUCLEOSIS SCREEN 2022 23:07:00 Garry Anne [facility]

 

                N-TERMINAL PRO-BNP 2022 23:07:00 Gurjit Lux [facility]

 

                HIV 1/2 AG-AB WITH REFLEX 2022 23:07:00 Gurjit Lux

[facility]

 

                RAPID INFLUENZA A/B 2022 23:06:00 Abdiel Talavera [facility]

 

                COVID-19 (ID NOW RAPID 2022 23:06:00 Abdiel [facility]alavera [facility]

[facility] [place]



                TESTING)                                        [facility] [place]

 

                LAB ONLY COVID  2022 23:06:00 Abdiel Talavera [facility]



                INTERPRETATION                                  [facility]

 

                CONSENT/REFUSAL FOR 2022 22:25:40 Doctor Unassigned, [facility]



                DIAGNOSIS AND TREATMENT                 [place]         [facility]

 

                NOTICE OF PRIVACY 2022 22:24:32 Doctor Unassigned, [facility]



                PRACTICES                       [place]         [facility]

 

                IMMTRAC2 CONSENT 2021 06:01:00 Doctor Unassigned, [facility]



                                                [place]         [facility]







Encounters







        Start   End     Encounter Admission Attending Care    Care    Encounter 

Source



        Date/Time Date/Time Type    Type    Clinicians Facility Department ID   

   

 

        2022 Transition         ANUEL Palomares  [id number]

[id number] Univers



        00:00:00 00:00:00 of Care         Criss APPIAH   350.1.13.10        

 [facility]   4.2.7.2.686         [place]

s



                                                        [phone]         Medi

pedro



                                                        [address]             Branch

 

        2022 Inpatient X       OLEGARIO RAMIREZ [facility]    MEGAN     [id number]

[id number] Univers



        16:45:00 16:48:00                                                 [facility]

 

        2022 [facility]         Abdiel Talavera  1.2.840.

[id number] 

Univers



        16:45:00 16:48:00 Encounter         Aakash Hoffman   350.1.1

3.10         [facility] 4.2.7.2.686  

       Chad Valera         [phone]  

       Medical



                                                        [address]             Branch

 

        2021 Nurse           Therapy, Adc Covid Infusion [facility]  

  1.2.840.114 

[id number]                                Univers



        16:00:00 17:00:00 Visit           Armand Pierce 350.1.13.10   

      ity of



                                                [place] 4.2.7.2.686         [place]

s



                                                SURGICAL [phone]         Med

ical



                                                CENTER  [address]             Branch

 

        2021 Outpatient R       STAN  [facility]    [id number]

[id number] Univers



        16:00:00 16:00:00                 ARMAND                           ity of



                                                                        [facility]

 

        2021 Orders          Doctor  CATARINO    1.2.840.114 [id number]

[id number] Univers



        00:00:00 00:00:00 Only            Unassigned, GRANT   350.1.13.10       

  ity of



                                        [facility] 4.2.7.2.686         Mahesh

as



                                                        [phone]         [address]







Results







           Test Description Test Time  Test Comments Results    Result Comments 

Source









                    Blood Culture - Peripheral # 1 2022 00:01:47 









                      Test Item  Value      Reference Range Interpretation Comme

nts









             Blood Culture-Aerobic (test No organisms isolated No growth        

         Previous 

preliminary



             code = 17928-3)                                        verified res

ult was



                                                                 Culture In Prog

ress on



                                                                 2022 at 210

1



                                                                 CSTPrevious pre

liminary



                                                                 verified result

 was No



                                                                 growth at 24 ho

urs on



                                                                 2022 at 180

1



                                                                 CSTPrevious pre

liminary



                                                                 verified result

 was No



                                                                 growth at 48 ho

urs on



                                                                 2022 at 180

1



                                                                 CSTPrevious pre

liminary



                                                                 verified result

 was No



                                                                 growth at 72 ho

urs on



                                                                 2022 at 180

1 CST

 

             Blood Culture-Anaerobic No organisms isolated No growth            

     Previous preliminary



             (test code = 31457-0)                                        verifi

ed result was



                                                                 Culture In Prog

ress on



                                                                 2022 at 210

1



                                                                 CSTPrevious pre

liminary



                                                                 verified result

 was No



                                                                 growth at 24 ho

urs on



                                                                 2022 at 180

1



                                                                 CSTPrevious pre

liminary



                                                                 verified result

 was No



                                                                 growth at 48 ho

urs on



                                                                 2022 at 180

1



                                                                 CSTPrevious pre

liminary



                                                                 verified result

 was No



                                                                 growth at 72 ho

urs on



                                                                 2022 at 180

1 CST

 

             Lab Interpretation (test Normal                                 



             code = 75054-9)                                        



[facility] [id number] 
00:01:47





             Test Item    Value        Reference Range Interpretation Comments

 

             Blood Culture-Aerobic No organisms No growth                 Previo

us



             (test code = 17928-3) isolated                               prelim

inary



                                                                 verified result



                                                                 was Culture In



                                                                 Progress on



                                                                 2022 at 210

1



                                                                 CSTPrevious



                                                                 preliminary



                                                                 verified result



                                                                 was No growth a

t



                                                                 24 hours on



                                                                 2022 at 180

1



                                                                 CSTPrevious



                                                                 preliminary



                                                                 verified result



                                                                 was No growth a

t



                                                                 48 hours on



                                                                 2022 at 180

1



                                                                 CSTPrevious



                                                                 preliminary



                                                                 verified result



                                                                 was No growth a

t



                                                                 72 hours on



                                                                 2022 at 180

1



                                                                 CST

 

             Blood        No organisms No growth                 Previous



             Culture-Anaerobic isolated                               preliminar

y



             (test code = 59986-5)                                        verifi

ed result



                                                                 was Culture In



                                                                 Progress on



                                                                 2022 at 210

1



                                                                 CSTPrevious



                                                                 preliminary



                                                                 verified result



                                                                 was No growth a

t



                                                                 24 hours on



                                                                 2022 at 180

1



                                                                 CSTPrevious



                                                                 preliminary



                                                                 verified result



                                                                 was No growth a

t



                                                                 48 hours on



                                                                 2022 at 180

1



                                                                 CSTPrevious



                                                                 preliminary



                                                                 verified result



                                                                 was No growth a

t



                                                                 72 hours on



                                                                 2022 at 180

1



                                                                 CST

 

             Lab Interpretation Normal                                 



             (test code = 23279-7)                                        



[facility] NAAT
2022 21:17:20





             Test Item    Value        Reference Range Interpretation Comments

 

             HCV Quantitative NAAT <1.00        Not Detected log              



             - log IU/mL (test code              IU/mL                     



             = 70205-7)                                          

 

             HCV Quantitative NAAT <10          Not Detected              



             - IU/mL (test code =              IU/mL                     



             18263-2)                                            

 

             HCV Quantitative Detected, not Not Detected A            



             Interpretation (test Quantifiable                           



             code = 2518092103)                                        

 

             SKYLER (test code = SKYLER) The Aptima HCV Quant                         

  



                          Dx assay is an                           



                          FDA-approved real-time                           



                          transcription-mediated                           



                          amplification (TMA)                           



                          test used for both                           



                          detection and                           



                          quantitation of                           



                          hepatitis C virus                           



                          (HCV) RNA in human                           



                          serum and plasma from                           



                          HCV-infected                           



                          individuals. ?It is                           



                          intended for use as an                           



                          aid in the diagnosis                           



                          of active HCV                           



                          infection and the                           



                          management of                           



                          HCV-infected patients                           



                          undergoing HCV                           



                          antiviral drug                           



                          therapy. ?It is not                           



                          approved for use as a                           



                          screening test for the                           



                          presence of HCV RNA in                           



                          blood or blood                           



                          products. The                           



                          quantitative range of                           



                          this assay is 1.00 -                           



                          8.00 log IU/mL or 10 -                           



                          100,000,000 IU/mL. An                           



                          interpretation of "Not                           



                          Detected" does not                           



                          rule out the presence                           



                          of inhibitors in the                           



                          patient specimen or                           



                          HCV RNA concentration                           



                          below the level of                           



                          detection of the test.                           



                          ?Care should be taken                           



                          when interpreting any                           



                          single viral load                           



                          determination.                           



                          Detected, not                           



                          Quantifiable: HCV RNA                           



                          detected, but at a                           



                          level below 10 IU/mL                           



                          (1.0 log IU/mL). ?HCV                           



                          RNA concentration is                           



                          below the lower limit                           



                          of quantitation of the                           



                          assay. Indeterminate:                           



                          Error indicated in the                           



                          generation of the                           



                          result. ?Please submit                           



                          a new specimen for                           



                          repeat testing if                           



                          clinically indicated.                           

 

             Lab Interpretation Abnormal                               



             (test code = 01923-2)                                        



[facility] [id number] 19:50:52





             Test Item    Value        Reference Range Interpretation Comments

 

             Nil (test code =              IU/mL                     



             14552-9)                                            

 

             TB1 minus Nil (test              IU/mL                     



             code = 78165-9)                                        

 

             TB2 minus Nil (test              IU/mL                     



             code = 5809678773)                                        

 

             Mitogen minus Nil              IU/mL                     



             (test code =                                        



             40423-7)                                            

 

             QFT Gold Plus Negative     Negative                  



             Result (test code =                                        



             25266-9)                                            

 

             SKYLER (test code = The QuantiFERON? TB Gold                          

 



             SKYLER)         Plus (in Tube) assay is                           



                          intended for use as an aid                           



                          in diagnosis of TB                           



                          infection. A qualitative                           



                          result (i.e., Negative,                           



                          Positive, or                           



                          Indeterminate) is based on                           



                          interpretation of the four                           



                          values, Nil, TB1 minus                           



                          Nil, TB2 minus Nil, and                           



                          Mitogen minus Nil. ?The                           



                          Nil value represents                           



                          nonspecific reactivity                           



                          produced by the patient                           



                          specimen. ?The TB1 minus                           



                          Nil value indicates the                           



                          interferon-gamma response                           



                          of CD4+ T lymphocytes,                           



                          specifically stimulated by                           



                          the TB1 antigens. ?The TB2                           



                          minus Nil value indicates                           



                          interferon-gamma response                           



                          of both CD4+ and CD8+ T                           



                          lymphocytes, stimulated by                           



                          TB2 antigens. ?The Mitogen                           



                          minus Nil value serves as                           



                          the positive control,                           



                          demonstrating the                           



                          successful responsiveness                           



                          of the T lymphocytes in                           



                          patient specimen. A                           



                          negative result suggests                           



                          that M. tuberculosis                           



                          infection is unlikely.                           



                          ?However, in patients with                           



                          high suspicion of                           



                          exposure, a negative test                           



                          should be repeated on a                           



                          new sample. A positive                           



                          result indicates an                           



                          interferon-gamma response                           



                          to M. tuberculosis                           



                          antigens, suggesting                           



                          infection with M.                           



                          tuberculosis. Positive                           



                          results in patients at                           



                          low-risk for tuberculosis                           



                          should be interpreted with                           



                          caution and repeat testing                           



                          on a new sample is                           



                          advised. ?If repeat                           



                          testing is positive,                           



                          treatment may be                           



                          indicated. ?Consult                           



                          Infectious Disease                           



                          Services for further                           



                          recommendations. False                           



                          positive results may occur                           



                          in patients with prior                           



                          infection with M. marinum,                           



                          M. szulgai, or M.                           



                          k[place]ii. For an                           



                          indeterminate result, the                           



                          likelihood of determining                           



                          infection with M.                           



                          tuberculosis cannot be                           



                          determined. ?If clinically                           



                          indicated, repeat testing                           



                          on a new sample is                           



                          advised. For further                           



                          information, refer to                           



                          http://www.cdc.gov/mmwr/pd                           



                          f/rr/nb3224.pdf and                           



                          https://doi.org/10.1093/ci                           



                          d/csz224.                              



[facility] (06667)2022 
13:01:28





             Test Item    Value        Reference Range Interpretation Comments

 

             NA (test code = 135 mmol/L   135-145                   



             [id number])                                         

 

             K (test code = 4.4 mmol/L   3.5-5.0                   



             8093152821)                                         

 

             CL (test code = 102 mmol/L                       



             6635733872)                                         

 

             CO2 TOTAL (test code = 29 mmol/L    23-31                     



             3439694221)                                         

 

             AGAP (test code =              2-16                      



             1713772963)                                         

 

             BUN (test code = 10 mg/dL     7-23                      



             3404031093)                                         

 

             GLUCOSE (test code = 86 mg/dL                         



             8929966221)                                         

 

             CREATININE (test code = 0.67 mg/dL   0.60-1.25                 



             8172400127)                                         

 

             TOTAL BILI (test code = 2.1 mg/dL    0.1-1.1      H            



             2120392888)                                         

 

             CALCIUM (test code = 8.2 mg/dL    8.6-10.6     L            



             9969598355)                                         

 

             T PROTEIN (test code = 7.4 g/dL     6.3-8.2                   



             6399919673)                                         

 

             ALBUMIN (test code = 3.2 g/dL     3.5-5.0      L            



             3867247987)                                         

 

             ALK PHOS (test code = 443 U/L             H            



             [id number])                                         

 

             ALTv (test code = 120 U/L      5-50         H            



             1742-6)                                             

 

             AST(SGOT) (test code = 107 U/L      13-40        H            



             0976441282)                                         

 

             eGFR (test code =              mL/min/1.73m2              



             0739079469)                                         

 

             SKYLER (test code = SKYLER) Association of                           



                          Glomerular Filtration                           



                          Rate (GFR) and Staging                           



                          of Kidney Disease*                           



                          +---------------------                           



                          --+-------------------                           



                          --+-------------------                           



                          ------+| GFR                           



                          (mL/min/1.73 m2) ?|                           



                          With Kidney Damage ?|                           



                          ?Without Kidney                           



                          Damage+---------------                           



                          --------+-------------                           



                          --------+-------------                           



                          ------------+| ?>90 ?                           



                          ? ? ? ? ? ? ? ?|                           



                          ?Stage one ? ? ? ? ?|                           



                          ? Normal ? ? ? ? ? ? ?                           



                          ?+--------------------                           



                          ---+------------------                           



                          ---+------------------                           



                          -------+| ?60-89 ? ? ?                           



                          ? ? ? ? ?| ?Stage two                           



                          ? ? ? ? ?| ? Decreased                           



                          GFR ? ? ? ?                            



                          +---------------------                           



                          --+-------------------                           



                          --+-------------------                           



                          ------+| ?30-59 ? ? ?                           



                          ? ? ? ? ?| ?Stage                           



                          three ? ? ? ?| ? Stage                           



                          three ? ? ? ? ?                           



                          +---------------------                           



                          --+-------------------                           



                          --+-------------------                           



                          ------+| ?15-29 ? ? ?                           



                          ? ? ? ? ?| ?Stage four                           



                          ? ? ? ? | ? Stage four                           



                          ? ? ? ? ?                              



                          ?+--------------------                           



                          ---+------------------                           



                          ---+------------------                           



                          -------+| ?<15 (or                           



                          dialysis) ? ?| ?Stage                           



                          five ? ? ? ? | ? Stage                           



                          five ? ? ? ? ?                           



                          ?+--------------------                           



                          ---+------------------                           



                          ---+------------------                           



                          -------+ *Each stage                           



                          assumes the associated                           



                          GFR level has been in                           



                          effect for at least                           



                          three months. ?Stages                           



                          1 to 5, with or                           



                          without kidney                           



                          disease, indicate                           



                          chronic kidney                           



                          disease. Notes:                           



                          Determination of                           



                          stages one and two                           



                          (with eGFR                             



                          >59mL/min/1.73 m2)                           



                          requires estimation of                           



                          kidney damage for at                           



                          least three months as                           



                          defined by structural                           



                          or functional                           



                          abnormalities of the                           



                          kidney, manifested by                           



                          either:Pathological                           



                          abnormalities or                           



                          Markers of kidney                           



                          damage (including                           



                          abnormalities in the                           



                          composition of the                           



                          blood or urine or                           



                          abnormalities in                           



                          imaging tests).                           

 

             Lab Interpretation Abnormal                               



             (test code = 39821-6)                                        



[id number] 04:58:33





             Test Item    Value        Reference Range Interpretation Comments

 

             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

ETIVE INFORMATION:



             (test code = 5325-6)                                        Typhus 

Fever Antibody, IgM



                                                                 ?Less than 1:64

 ......



                                                                 Negative-No sig

nificant



                                                                 level of ? ? ? 

? ? ? ? ? ? ?



                                                                 ? ?IgM antibody

 detected.



                                                                 ?1:64 or greate

r .....



                                                                 Positive-Presen

ce of IgM



                                                                 antibody ? ? ? 

? ? ? ? ? ? ?



                                                                 ? ?detected, wh

ich may



                                                                 indicate a ? ? 

? ? ? ? ? ? ?



                                                                 ? ? ?current or

 recent



                                                                 infection; ? ? 

? ? ? ? ? ? ?



                                                                 ? ? ?however, l

ow levels of



                                                                 IgM  ? ? ? ? ? 

? ? ? ? ? ?



                                                                 ?antibodies may

 occasionally



                                                                 persist ? ? ? ?

 ? ? ? ? ? ?



                                                                 ? ?for more minda

n 12 months



                                                                 ? ? ? ? ? ? ? ?

 ? ? ?



                                                                 ?post-infection

. Antibody



                                                                 reactivity to R

ickettsia



                                                                 typhi antigen s

hould be



                                                                 considered grou

p-reactive



                                                                 for the Typhus 

Fever group,



                                                                 which includes 

Rickettsia



                                                                 prowazekii.  Se

roconversion



                                                                 between acute a

nd



                                                                 convalescent se

ra is



                                                                 considered stro

ng evidence



                                                                 of recent infec

tion. The



                                                                 best evidence i

s a



                                                                 significant telma

nge (fourfold



                                                                 difference in t

iter) on two



                                                                 appropriately t

imed



                                                                 specimens, wher

e both tests



                                                                 are done in the

 same



                                                                 laboratory at t

he same time.



                                                                 Acute-phase spe

cimens are



                                                                 collected durin

g the first



                                                                 week of illness

 and



                                                                 convalescent-ph

ase samples



                                                                 are generally o

btained 2-4



                                                                 weeks after res

olution of



                                                                 illness. Ideall

y these



                                                                 samples should 

be tested



                                                                 simultaneously 

at the same



                                                                 facility. If th

e sample



                                                                 submitted was c

ollected



                                                                 during the actu

e-phase of



                                                                 illness, submit

 a marked



                                                                 convalescent sa

mple within



                                                                 25 days for rosendo

red



                                                                 testing.Perform

ed By: [employer] [place] [address]



                                                                 [id number]

 Director:



                                                                 Lurdes France MD



                                                                 [Automated mess

age] The



                                                                 system which ge

nerated this



                                                                 result transmit

[place] reference



                                                                 range: <1:64. T

he reference



                                                                 range was not u

sed to



                                                                 interpret this 

result as



                                                                 normal/abnormal

.



[facility]AT
2022 22:42:32





             Test Item    Value        Reference Range Interpretation Comments

 

             HBV Quantitative NAAT              Not Detected log              



             - log IU/mL (test code              IU/mL                     



             = [id number])                                        

 

             HBV Quantitative NAAT              Not Detected              



             IU/ml (test code =              IU/mL                     



             15325-8)                                            

 

             HBV Quantitative Detected     Not Detected A            



             Interpretation (test                                        



             code = 29610-3)                                        

 

             SKYLER (test code = SKYLER) The Aptima HBV Quant                         

  



                          assay is an                            



                          FDA-approved in vitro                           



                          nucleic acid                           



                          amplification test for                           



                          the quantitation of                           



                          hepatitis B virus                           



                          (HBV) DNA in human                           



                          plasma and serum. ?It                           



                          is intended for use as                           



                          an aid in the                           



                          management of patients                           



                          with chronic HBV                           



                          infections undergoing                           



                          HBV antiviral drug                           



                          therapy. ?It is not                           



                          approved for use as a                           



                          screening test for the                           



                          presence of HBV DNA in                           



                          blood or blood                           



                          products or as a                           



                          diagnostic test to                           



                          confirm the presence                           



                          of HBV infection. The                           



                          quantitative range of                           



                          this assay is 1.00 -                           



                          9.00 log IU/mL or 10 -                           



                          1,000,000,000 IU/mL.                           



                          An interpretation of                           



                          "Not Detected" does                           



                          not rule out the                           



                          presence of inhibitors                           



                          in the patient                           



                          specimen or HBV DNA                           



                          concentration below                           



                          the level of detection                           



                          of the test. ?Care                           



                          should be taken when                           



                          interpreting any                           



                          single viral load                           



                          determination.                           



                          Detected, not                           



                          Quantifiable: HBV DNA                           



                          detected, but at a                           



                          level below 10 IU/mL                           



                          (1.0 log IU/mL). ?HBV                           



                          DNA concentration is                           



                          below the lower limit                           



                          of quantitation of the                           



                          assay. Indeterminate:                           



                          Error indicated in the                           



                          generation of the                           



                          result. ?Please submit                           



                          a new specimen for                           



                          repeat testing if                           



                          clinically indicated.                           

 

             Lab Interpretation Abnormal                               



             (test code = 43841-3)                                        



[facility] METABOLIC PANEL ([id number])2022 
12:29:46





             Test Item    Value        Reference Range Interpretation Comments

 

             NA (test code = 131 mmol/L   135-145      L            



             5021700135)                                         

 

             K (test code = 4.1 mmol/L   3.5-5.0                   



             9915436290)                                         

 

             CL (test code = 98 mmol/L                        



             7862508636)                                         

 

             CO2 TOTAL (test code = 27 mmol/L    23-31                     



             7472470159)                                         

 

             AGAP (test code =              2-16                      



             3578107489)                                         

 

             BUN (test code = 9 mg/dL      7-23                      



             8793220382)                                         

 

             GLUCOSE (test code = 90 mg/dL                         



             8941197821)                                         

 

             CREATININE (test code = 0.79 mg/dL   0.60-1.25                 



             5183494505)                                         

 

             TOTAL BILI (test code = 2.3 mg/dL    0.1-1.1      H            



             8448505253)                                         

 

             CALCIUM (test code = 8.1 mg/dL    8.6-10.6     L            



             6146302458)                                         

 

             T PROTEIN (test code = 7.7 g/dL     6.3-8.2                   



             8383130241)                                         

 

             ALBUMIN (test code = 3.3 g/dL     3.5-5.0      L            



             2085825098)                                         

 

             ALK PHOS (test code = 508 U/L             H            



             2397503900)                                         

 

             ALTv (test code = 138 U/L      5-50         H            



             1742-6)                                             

 

             AST(SGOT) (test code = 121 U/L      13-40        H            



             7076924785)                                         

 

             eGFR (test code =              mL/min/1.73m2              



             0455058215)                                         

 

             SKYLER (test code = SKYLER) Association of                           



                          Glomerular Filtration                           



                          Rate (GFR) and Staging                           



                          of Kidney Disease*                           



                          +---------------------                           



                          --+-------------------                           



                          --+-------------------                           



                          ------+| GFR                           



                          (mL/min/1.73 m2) ?|                           



                          With Kidney Damage ?|                           



                          ?Without Kidney                           



                          Damage+---------------                           



                          --------+-------------                           



                          --------+-------------                           



                          ------------+| ?>90 ?                           



                          ? ? ? ? ? ? ? ?|                           



                          ?Stage one ? ? ? ? ?|                           



                          ? Normal ? ? ? ? ? ? ?                           



                          ?+--------------------                           



                          ---+------------------                           



                          ---+------------------                           



                          -------+| ?60-89 ? ? ?                           



                          ? ? ? ? ?| ?Stage two                           



                          ? ? ? ? ?| ? Decreased                           



                          GFR ? ? ? ?                            



                          +---------------------                           



                          --+-------------------                           



                          --+-------------------                           



                          ------+| ?30-59 ? ? ?                           



                          ? ? ? ? ?| ?Stage                           



                          three ? ? ? ?| ? Stage                           



                          three ? ? ? ? ?                           



                          +---------------------                           



                          --+-------------------                           



                          --+-------------------                           



                          ------+| ?15-29 ? ? ?                           



                          ? ? ? ? ?| ?Stage four                           



                          ? ? ? ? | ? Stage four                           



                          ? ? ? ? ?                              



                          ?+--------------------                           



                          ---+------------------                           



                          ---+------------------                           



                          -------+| ?<15 (or                           



                          dialysis) ? ?| ?Stage                           



                          five ? ? ? ? | ? Stage                           



                          five ? ? ? ? ?                           



                          ?+--------------------                           



                          ---+------------------                           



                          ---+------------------                           



                          -------+ *Each stage                           



                          assumes the associated                           



                          GFR level has been in                           



                          effect for at least                           



                          three months. ?Stages                           



                          1 to 5, with or                           



                          without kidney                           



                          disease, indicate                           



                          chronic kidney                           



                          disease. Notes:                           



                          Determination of                           



                          stages one and two                           



                          (with eGFR                             



                          >59mL/min/1.73 m2)                           



                          requires estimation of                           



                          kidney damage for at                           



                          least three months as                           



                          defined by structural                           



                          or functional                           



                          abnormalities of the                           



                          kidney, manifested by                           



                          either:Pathological                           



                          abnormalities or                           



                          Markers of kidney                           



                          damage (including                           



                          abnormalities in the                           



                          composition of the                           



                          blood or urine or                           



                          abnormalities in                           



                          imaging tests).                           

 

             Lab Interpretation Abnormal                               



             (test code = 16190-8)                                        



[facility] WITH [id number] 12:04:01





             Test Item    Value        Reference Range Interpretation Comments

 

             WBC (test code =              See_Comment                [Automated



             3990-2)                                             message] The sy

stem



                                                                 which generated



                                                                 this result



                                                                 transmitted



                                                                 reference range

:



                                                                 4.20 - 10.70



                                                                 10*3/?L. The



                                                                 reference range

 was



                                                                 not used to



                                                                 interpret this



                                                                 result as



                                                                 normal/abnormal

.

 

             RBC (test code =              See_Comment  L             [Automated



             549-8)                                              message] The sy

stem



                                                                 which generated



                                                                 this result



                                                                 transmitted



                                                                 reference range

:



                                                                 4.26 - 5.52



                                                                 10*6/?L. The



                                                                 reference range

 was



                                                                 not used to



                                                                 interpret this



                                                                 result as



                                                                 normal/abnormal

.

 

             HGB (test code = 9.7 g/dL     12.2-16.4    L            



             718-7)                                              

 

             HCT (test code = 29.2 %       38.4-49.3    L            



             4544-3)                                             

 

             MCV (test code = 89.6 fL      81.7-95.6                 



             787-2)                                              

 

             MCH (test code = 29.8 pg      26.1-32.7                 



             785-6)                                              

 

             MCHC (test code = 33.2 g/dL    31.2-35.0                 



             786-4)                                              

 

             RDW-SD (test code = 55.9 fL      38.5-51.6    H            



             90794-3)                                            

 

             RDW-CV (test code = 17.0 %       12.1-15.4    H            



             788-0)                                              

 

             PLT (test code =              See_Comment  H             [Automated



             777-3)                                              message] The sy

stem



                                                                 which generated



                                                                 this result



                                                                 transmitted



                                                                 reference range

:



                                                                 150 - 328 10*3/

?L.



                                                                 The reference r

leslie



                                                                 was not used to



                                                                 interpret this



                                                                 result as



                                                                 normal/abnormal

.

 

             MPV (test code = 9.5 fL       9.8-13.0     L            



             34635-2)                                            

 

             NRBC/100 WBC (test              See_Comment                [Automat

ed



             code = 4956005209)                                        message] 

The system



                                                                 which generated



                                                                 this result



                                                                 transmitted



                                                                 reference range

:



                                                                 0.0 - 10.0 /100



                                                                 WBCs. The refer

ence



                                                                 range was not u

sed



                                                                 to interpret th

is



                                                                 result as



                                                                 normal/abnormal

.

 

             NRBC x10^3 (test code <0.01        See_Comment                [Auto

mated



             = [id number])                                        message] The s

ystem



                                                                 which generated



                                                                 this result



                                                                 transmitted



                                                                 reference range

:



                                                                 10*3/?L. The



                                                                 reference range

 was



                                                                 not used to



                                                                 interpret this



                                                                 result as



                                                                 normal/abnormal

.

 

             GRAN MAT (NEUT) % 77.6 %                                 



             (test code = 770-8)                                        

 

             IMM GRAN % (test code 0.90 %                                 



             = 1500478309)                                        

 

             LYMPH % (test code = 9.7 %                                  



             736-9)                                              

 

             MONO % (test code = 9.5 %                                  



             5905-5)                                             

 

             EOS % (test code = 1.8 %                                  



             713-8)                                              

 

             BASO % (test code = 0.5 %                                  



             706-2)                                              

 

             GRAN MAT x10^3(ANC) 5.99 10*3/uL 1.99-6.95                 



             (test code =                                        



             1058652893)                                         

 

             IMM GRAN x10^3 (test 0.07 10*3/uL 0.00-0.06    H            



             code = 2025735449)                                        

 

             LYMPH x10^3 (test code 0.75 10*3/uL 1.09-3.23    L            



             = 731-0)                                            

 

             MONO x10^3 (test code 0.73 10*3/uL 0.36-1.02                 



             = 742-7)                                            

 

             EOS x10^3 (test code = 0.14 10*3/uL 0.06-0.53                 



             711-2)                                              

 

             BASO x10^3 (test code 0.04 10*3/uL 0.01-0.09                 



             = 704-7)                                            

 

             Lab Interpretation Abnormal                               



             (test code = 16960-1)                                        



[id number] 21[facility]33:53





             Test Item    Value        Reference Range Interpretation Comments

 

             Specimen Tested Plasma                                 



             (test code =                                        



             0613044904)                                         

 

             CMV PCR - log <2.5         See_Comment                [Automated



             IU/mL (test                                         message] The



             code = 90940-4)                                        system which



                                                                 generated this



                                                                 result



                                                                 transmitted



                                                                 reference range

:



                                                                 <2.5 log IU/mL.



                                                                 The reference



                                                                 range was not



                                                                 used to interpr

et



                                                                 this result as



                                                                 normal/abnormal

.

 

             CMV PCR - IU/mL <300         See_Comment                [Automated



             (test code =                                        message] The



             07138-6)                                            system which



                                                                 generated this



                                                                 result



                                                                 transmitted



                                                                 reference range

:



                                                                 <300 IU/mL. The



                                                                 reference range



                                                                 was not used to



                                                                 interpret this



                                                                 result as



                                                                 normal/abnormal

.

 

             CMV PCR - log <2.7         See_Comment                [Automated



             copies/mL (test                                        message] The



             code = 94219-1)                                        system which



                                                                 generated this



                                                                 result



                                                                 transmitted



                                                                 reference range

:



                                                                 <2.7 log



                                                                 copies/mL. The



                                                                 reference range



                                                                 was not used to



                                                                 interpret this



                                                                 result as



                                                                 normal/abnormal

.

 

             CMV PCR -    <516         See_Comment                [Automated



             copies/mL (test                                        message] The



             code = 76473-4)                                        system which



                                                                 generated this



                                                                 result



                                                                 transmitted



                                                                 reference range

:



                                                                 <516 copies/mL.



                                                                 The reference



                                                                 range was not



                                                                 used to interpr

et



                                                                 this result as



                                                                 normal/abnormal

.

 

             SKYLER (test code Test Information:                           



             = SKYLER)       Cytomegalovirus (CMV)                           



                          DNA, Quantitation. The                           



                          quantitative range of                           



                          this assay is 2.5 -                           



                          6.5 log IU/mL (300 -                           



                          3,000,000 IU/mL) ?or                           



                          2.7 - 6.7 log                           



                          copies/mL (516 -                           



                          5,160,000 copies/mL).                           



                          ?One IU/mL of CMV DNA                           



                          is approximately 1.72                           



                          copies/mL.  ?A                           



                          negative result (less                           



                          than 2.5 log IU/mL or                           



                          less than 300 IU/mL)                           



                          does not rule out the                           



                          presence of PCR                           



                          inhibitors in the                           



                          patient specimen or                           



                          CMV DNA concentrations                           



                          below the level of                           



                          detection by the                           



                          assay. Inhibition may                           



                          also lead to                           



                          underestimation of                           



                          viral quantitation.                           



                          ?The limit of                           



                          quantification for                           



                          this DNA assay is 2.5                           



                          log IU/mL (300 IU/mL)                           



                          or 2.7 log copies/mL                           



                          (516 copies/mL). ?If                           



                          the assay DID NOT                           



                          DETECT the virus, the                           



                          test result will be                           



                          reported as "<2.5 log                           



                          IU/mL (<300 IU/mL)"                           



                          and "<2.7 log                           



                          copies/mL (<516                           



                          copies/mL)." ?If the                           



                          assay DETECTED the                           



                          presence of the virus                           



                          but was not able to                           



                          accurately quantify                           



                          the number of copies,                           



                          the test result will                           



                          be reported as                           



                          "Detected, not                           



                          quantifiable."                           



                          Indeterminate: ?Unable                           



                          to generate a valid                           



                          test result on this                           



                          specimen. ?Please                           



                          submit a new specimen                           



                          for repeat testing if                           



                          clinically indicated.                           



                          This is a                              



                          laboratory-developed                           



                          test using a                           



                           labeled                           



                          ASR (Analyte Specific                           



                          Reagent) as the                           



                          reagent providing the                           



                          specificity of the                           



                          assay. ?This test was                           



                          developed and its                           



                          performance                            



                          characteristics                           



                          determined by [facility]                           



                          Clinical Microbiology                           



                          Laboratory. It has not                           



                          been cleared or                           



                          approved by the [place]                           



                          Food and Drug                           



                          Administration;                           



                          however, the FDA has                           



                          determined that such                           



                          clearance or approval                           



                          is not necessary. This                           



                          test is used for                           



                          clinical purposes. It                           



                          should not be regarded                           



                          as investigational or                           



                          for research. This                           



                          laboratory is                           



                          certified under the                           



                          Clinical Laboratory                           



                          Improvement Amendments                           



                          of 1988 (CLIA-88) as                           



                          qualified to perform                           



                          high complexity                           



                          clinical laboratory                           



                          testing.                               



[facility] [id number] 19[facility]59:34





             Test Item    Value        Reference Range Interpretation Comments

 

             HAPTOGLOB (test code = 3787519467) 234 mg/dL           H     

       

 

             Lab Interpretation (test code = Abnormal                           

    



             79737-5)                                            



[facility] [id number] 17[facility]58:12





             Test Item    Value        Reference Range Interpretation Comments

 

             FERRITIN (test code = 135.0 ng/mL  18.0-464.0                



             [id number])                                         

 

             SKYLER (test code = SKYLER) Biotin has been                           



                          reported to cause a                           



                          negative bias,                           



                          interpret results                           



                          relative to                            



                          patient's use of                           



                          biotin.                                

 

             Lab Interpretation (test Normal                                 



             code = 97327-9)                                        



[facility]





             Test Item    Value        Reference Range Interpretation Comments

 

             IRON (test code = 5776832840) 50 ug/dL                       

  

 

             Lab Interpretation (test code = Normal                             

    



             02329-0)                                            



[facility]. METABOLIC PANEL (63969)2022 
11:54:31





             Test Item    Value        Reference Range Interpretation Comments

 

             NA (test code = 132 mmol/L   135-145      L            



             8852261730)                                         

 

             K (test code = 3.6 mmol/L   3.5-5.0                   



             7045911180)                                         

 

             CL (test code = 101 mmol/L                       



             5351878371)                                         

 

             CO2 TOTAL (test code = 24 mmol/L    23-31                     



             6697194586)                                         

 

             AGAP (test code =              2-16                      



             5517613181)                                         

 

             BUN (test code = 8 mg/dL      7-23                      



             4876373303)                                         

 

             GLUCOSE (test code = 115 mg/dL           H            



             9619786462)                                         

 

             CREATININE (test code = 0.75 mg/dL   0.60-1.25                 



             2312672568)                                         

 

             TOTAL BILI (test code = 1.8 mg/dL    0.1-1.1      H            



             2861767130)                                         

 

             CALCIUM (test code = 7.9 mg/dL    8.6-10.6     L            



             7644147901)                                         

 

             T PROTEIN (test code = 7.2 g/dL     6.3-8.2                   



             6499740463)                                         

 

             ALBUMIN (test code = 3.1 g/dL     3.5-5.0      L            



             8391355204)                                         

 

             ALK PHOS (test code = 471 U/L             H            



             0525084822)                                         

 

             ALTv (test code = 128 U/L      5-50         H            



             1742-6)                                             

 

             AST(SGOT) (test code = 104 U/L      13-40        H            



             4823561365)                                         

 

             eGFR (test code =              mL/min/1.73m2              



             2633485485)                                         

 

             SKYLER (test code = SKYLER) Association of                           



                          Glomerular Filtration                           



                          Rate (GFR) and Staging                           



                          of Kidney Disease*                           



                          +---------------------                           



                          --+-------------------                           



                          --+-------------------                           



                          ------+| GFR                           



                          (mL/min/1.73 m2) ?|                           



                          With Kidney Damage ?|                           



                          ?Without Kidney                           



                          Damage+---------------                           



                          --------+-------------                           



                          --------+-------------                           



                          ------------+| ?>90 ?                           



                          ? ? ? ? ? ? ? ?|                           



                          ?Stage one ? ? ? ? ?|                           



                          ? Normal ? ? ? ? ? ? ?                           



                          ?+--------------------                           



                          ---+------------------                           



                          ---+------------------                           



                          -------+| ?60-89 ? ? ?                           



                          ? ? ? ? ?| ?Stage two                           



                          ? ? ? ? ?| ? Decreased                           



                          GFR ? ? ? ?                            



                          +---------------------                           



                          --+-------------------                           



                          --+-------------------                           



                          ------+| ?30-59 ? ? ?                           



                          ? ? ? ? ?| ?Stage                           



                          three ? ? ? ?| ? Stage                           



                          three ? ? ? ? ?                           



                          +---------------------                           



                          --+-------------------                           



                          --+-------------------                           



                          ------+| ?15-29 ? ? ?                           



                          ? ? ? ? ?| ?Stage four                           



                          ? ? ? ? | ? Stage four                           



                          ? ? ? ? ?                              



                          ?+--------------------                           



                          ---+------------------                           



                          ---+------------------                           



                          -------+| ?<15 (or                           



                          dialysis) ? ?| ?Stage                           



                          five ? ? ? ? | ? Stage                           



                          five ? ? ? ? ?                           



                          ?+--------------------                           



                          ---+------------------                           



                          ---+------------------                           



                          -------+ *Each stage                           



                          assumes the associated                           



                          GFR level has been in                           



                          effect for at least                           



                          three months. ?Stages                           



                          1 to 5, with or                           



                          without kidney                           



                          disease, indicate                           



                          chronic kidney                           



                          disease. Notes:                           



                          Determination of                           



                          stages one and two                           



                          (with eGFR                             



                          >59mL/min/1.73 m2)                           



                          requires estimation of                           



                          kidney damage for at                           



                          least three months as                           



                          defined by structural                           



                          or functional                           



                          abnormalities of the                           



                          kidney, manifested by                           



                          either:Pathological                           



                          abnormalities or                           



                          Markers of kidney                           



                          damage (including                           



                          abnormalities in the                           



                          composition of the                           



                          blood or urine or                           



                          abnormalities in                           



                          imaging tests).                           

 

             Lab Interpretation Abnormal                               



             (test code = 38751-8)                                        



[facility] [id number] 
11:54:31





             Test Item    Value        Reference Range Interpretation Comments

 

             BILI CONJ (test code = 5531668488) 0.1 mg/dL    0.0-0.3            

       

 

             BILI UNCON (test code = 9538469077) 0.5 mg/dL    0.1-1.1           

        

 

             Lab Interpretation (test code = Normal                             

    



             11096-1)                                            



[facility] WITH [id number] 11:27:05





             Test Item    Value        Reference Range Interpretation Comments

 

             WBC (test code =              See_Comment                [Automated



             1490-2)                                             message] The sy

stem



                                                                 which generated



                                                                 this result



                                                                 transmitted



                                                                 reference range

:



                                                                 4.20 - 10.70



                                                                 10*3/?L. The



                                                                 reference range

 was



                                                                 not used to



                                                                 interpret this



                                                                 result as



                                                                 normal/abnormal

.

 

             RBC (test code =              See_Comment  L             [Automated



             039-8)                                              message] The sy

stem



                                                                 which generated



                                                                 this result



                                                                 transmitted



                                                                 reference range

:



                                                                 4.26 - 5.52



                                                                 10*6/?L. The



                                                                 reference range

 was



                                                                 not used to



                                                                 interpret this



                                                                 result as



                                                                 normal/abnormal

.

 

             HGB (test code = 9.6 g/dL     12.2-16.4    L            



             718-7)                                              

 

             HCT (test code = 29.3 %       38.4-49.3    L            



             4544-3)                                             

 

             MCV (test code = 89.1 fL      81.7-95.6                 



             787-2)                                              

 

             MCH (test code = 29.2 pg      26.1-32.7                 



             785-6)                                              

 

             MCHC (test code = 32.8 g/dL    31.2-35.0                 



             786-4)                                              

 

             RDW-SD (test code = 55.4 fL      38.5-51.6    H            



             23011-5)                                            

 

             RDW-CV (test code = 17.0 %       12.1-15.4    H            



             788-0)                                              

 

             PLT (test code =              See_Comment  H             [Automated



             777-3)                                              message] The sy

stem



                                                                 which generated



                                                                 this result



                                                                 transmitted



                                                                 reference range

:



                                                                 150 - 328 10*3/

?L.



                                                                 The reference r

leslie



                                                                 was not used to



                                                                 interpret this



                                                                 result as



                                                                 normal/abnormal

.

 

             MPV (test code = 9.3 fL       9.8-13.0     L            



             23821-1)                                            

 

             NRBC/100 WBC (test              See_Comment                [Automat

ed



             code = 9449592752)                                        message] 

The system



                                                                 which generated



                                                                 this result



                                                                 transmitted



                                                                 reference range

:



                                                                 0.0 - 10.0 /100



                                                                 WBCs. The refer

ence



                                                                 range was not u

sed



                                                                 to interpret th

is



                                                                 result as



                                                                 normal/abnormal

.

 

             NRBC x10^3 (test code <0.01        See_Comment                [Auto

mated



             = 6744403069)                                        message] The s

ystem



                                                                 which generated



                                                                 this result



                                                                 transmitted



                                                                 reference range

:



                                                                 10*3/?L. The



                                                                 reference range

 was



                                                                 not used to



                                                                 interpret this



                                                                 result as



                                                                 normal/abnormal

.

 

             GRAN MAT (NEUT) % 66.5 %                                 



             (test code = 770-8)                                        

 

             IMM GRAN % (test code 1.70 %                                 



             = 0298188332)                                        

 

             LYMPH % (test code = 14.9 %                                 



             736-9)                                              

 

             MONO % (test code = 14.3 %                                 



             5905-5)                                             

 

             EOS % (test code = 1.9 %                                  



             713-8)                                              

 

             BASO % (test code = 0.7 %                                  



             706-2)                                              

 

             GRAN MAT x10^3(ANC) 4.99 10*3/uL 1.99-6.95                 



             (test code =                                        



             8654260230)                                         

 

             IMM GRAN x10^3 (test 0.13 10*3/uL 0.00-0.06    H            



             code = 8235991707)                                        

 

             LYMPH x10^3 (test code 1.12 10*3/uL 1.09-3.23                 



             = 731-0)                                            

 

             MONO x10^3 (test code 1.07 10*3/uL 0.36-1.02    H            



             = 742-7)                                            

 

             EOS x10^3 (test code = 0.14 10*3/uL 0.06-0.53                 



             711-2)                                              

 

             BASO x10^3 (test code 0.05 10*3/uL 0.01-0.09                 



             = 704-7)                                            

 

             Lab Interpretation Abnormal                               



             (test code = 85278-9)                                        



[id number] 20:30:26





             Test Item    Value        Reference Range Interpretation Comments

 

             CRP (test code = 4560475632) 8.4 mg/dL    <0.8         H           

 

 

             Lab Interpretation (test code = Abnormal                           

    



             56276-2)                                            



[facility] [id number] 20:05:03





             Test Item    Value        Reference Range Interpretation Comments

 

             T. pallidum Particle Reactive     Nonreactive  A            



             Agglutination (test code =                                        



             9102914715)                                         

 

             SKYLER (test code = SKYLER) Based on IgG/IgM,                           



                          RPR, and TPPA                           



                          results, probable                           



                          active T. pallidum                           



                          infection. ?                           

 

             Lab Interpretation (test Abnormal                               



             code = 01808-3)                                        



[facility] 19[facility]56[facility]12





             Test Item    Value        Reference Range Interpretation Comments

 

             RPR (Quantitative) (test code = 1:256        Nonreactive  A        

    



             33247-9)                                            

 

             Lab Interpretation (test code = Abnormal                           

    



             55520-9)                                            



[facility] [id number] 19:03:09
LEUKOCYTES ARE UNREMARKABLE.  FEW REACTIVE LYMPHOCYTES IDENTIFIED. MILD 
NORMOCYTIC NORMOCHROMIC ANEMIA. PLATELETS ARE UNREMARKABLE.[facility] SY[facility]IL[facility] [id number] 16:58:56





             Test Item    Value        Reference Range Interpretation Comments

 

             Syphilis IgG/IgM (test Reactive     Non-reactive A            



             code = 25415-3)                                        

 

             SKYLER (test code = SKYLER)  Non-reactive - No                           



                          serologic evidence                           



                          of T. pallidum                           



                          infection. Cannot                           



                          exclude incubating                           



                          or early syphilis.                           



                          Submit a second                           



                          specimen in 2-4                           



                          weeks if syphilis is                           



                          clinically                             



                          suspected. Equivocal                           



                          - Further testing to                           



                          follow. Reactive -                           



                          Further testing to                           



                          follow.                                

 

             Lab Interpretation (test Abnormal                               



             code = 63121-0)                                        



[facility] [id number] 15:42:39





             Test Item    Value        Reference Range Interpretation Comments

 

             HCV Ab (test code = Positive                               



             53515-7)                                            

 

             HCV                                                 



             Semi-Quantitative                                        



             (test code =                                        



             58676-5)                                            

 

              APRI (test code =                                        



             1777841138)                                         

 

             SKYLER (test code = Positive for HCV antibody                         

  



             SKYLER)         with a high signal to                           



                          cutoff ratio (s/c).                           



                          ?Supplementary test for                           



                          Hepatitis C Virus RNA                           



                          Real-Time PCR is                           



                          recommended if clinically                           



                          indicated. ?If any                           



                          questions, please contact                           



                          Clinical Chemistry                           



                          Director on call at                           



                          [phone].APRI score <                           



                          0.5: Suggestive of little                           



                          to no fibrosisAPRI score >                           



                          1.5: Suggestive of                           



                          moderate to severe                           



                          fibrosisAPRI score > 2.0:                           



                          Highly suggestive of                           



                          cirrhosis.                             



[facility] WITH [id number] 14:43:40





             Test Item    Value        Reference Range Interpretation Comments

 

             WBC (test code =              See_Comment                [Automated



             6690-2)                                             message] The sy

stem



                                                                 which generated



                                                                 this result



                                                                 transmitted



                                                                 reference range

:



                                                                 4.20 - 10.70



                                                                 10*3/?L. The



                                                                 reference range

 was



                                                                 not used to



                                                                 interpret this



                                                                 result as



                                                                 normal/abnormal

.

 

             RBC (test code =              See_Comment  L             [Automated



             789-8)                                              message] The sy

stem



                                                                 which generated



                                                                 this result



                                                                 transmitted



                                                                 reference range

:



                                                                 4.26 - 5.52



                                                                 10*6/?L. The



                                                                 reference range

 was



                                                                 not used to



                                                                 interpret this



                                                                 result as



                                                                 normal/abnormal

.

 

             HGB (test code = 11.8 g/dL    12.2-16.4    L            



             718-7)                                              

 

             HCT (test code = 34.7 %       38.4-49.3    L            



             4544-3)                                             

 

             MCV (test code = 89.0 fL      81.7-95.6                 



             787-2)                                              

 

             MCH (test code = 30.3 pg      26.1-32.7                 



             785-6)                                              

 

             MCHC (test code = 34.0 g/dL    31.2-35.0                 



             786-4)                                              

 

             RDW-SD (test code = 57.9 fL      38.5-51.6    H            



             20933-6)                                            

 

             RDW-CV (test code = 18.0 %       12.1-15.4    H            



             788-0)                                              

 

             PLT (test code =              See_Comment                [Automated



             777-3)                                              message] The sy

stem



                                                                 which generated



                                                                 this result



                                                                 transmitted



                                                                 reference range

:



                                                                 150 - 328 10*3/

?L.



                                                                 The reference r

leslie



                                                                 was not used to



                                                                 interpret this



                                                                 result as



                                                                 normal/abnormal

.

 

             MPV (test code = 10.8 fL      9.8-13.0                  



             79991-7)                                            

 

             NRBC/100 WBC (test              See_Comment                [Automat

ed



             code = 6823017531)                                        message] 

The system



                                                                 which generated



                                                                 this result



                                                                 transmitted



                                                                 reference range

:



                                                                 0.0 - 10.0 /100



                                                                 WBCs. The refer

ence



                                                                 range was not u

sed



                                                                 to interpret th

is



                                                                 result as



                                                                 normal/abnormal

.

 

             NRBC x10^3 (test code <0.01        See_Comment                [Auto

mated



             = [id number])                                        message] The s

ystem



                                                                 which generated



                                                                 this result



                                                                 transmitted



                                                                 reference range

:



                                                                 10*3/?L. The



                                                                 reference range

 was



                                                                 not used to



                                                                 interpret this



                                                                 result as



                                                                 normal/abnormal

.

 

             GRAN MAT (NEUT) % 68.8 %                                 



             (test code = 770-8)                                        

 

             IMM GRAN % (test code 1.20 %                                 



             = 4183935236)                                        

 

             LYMPH % (test code = 16.6 %                                 



             736-9)                                              

 

             MONO % (test code = 11.0 %                                 



             5905-5)                                             

 

             EOS % (test code = 1.6 %                                  



             713-8)                                              

 

             BASO % (test code = 0.8 %                                  



             706-2)                                              

 

             GRAN MAT x10^3(ANC) 5.06 10*3/uL 1.99-6.95                 



             (test code =                                        



             9770813891)                                         

 

             IMM GRAN x10^3 (test 0.09 10*3/uL 0.00-0.06    H            



             code = 6317320189)                                        

 

             LYMPH x10^3 (test code 1.22 10*3/uL 1.09-3.23                 



             = 731-0)                                            

 

             MONO x10^3 (test code 0.81 10*3/uL 0.36-1.02                 



             = 742-7)                                            

 

             EOS x10^3 (test code = 0.12 10*3/uL 0.06-0.53                 



             711-2)                                              

 

             BASO x10^3 (test code 0.06 10*3/uL 0.01-0.09                 



             = 704-7)                                            

 

             Lab Interpretation Abnormal                               



             (test code = 80193-1)                                        



[id number] 
11:33:57





             Test Item    Value        Reference Range Interpretation Comments

 

             HBsAB (test code = Negative                               



             4352435881)                                         

 

             HBsAb                     mIU/mL                    



             Semi-Quantitative                                        



             (test code =                                        



             6598796919)                                         

 

             SKYLER (test code = Interpretation:                           



             SKYLER)         ?Hepatitis B Surface                           



                          Antibody ? ?  ? ?                           



                          Negative - Patient is                           



                          considered to be not                           



                          immune to infection with                           



                          HBV. ? ? Positive -                           



                          Anti-HBs detected at                           



                          greater than or equal to                           



                          12 mIU/mL. ?Patient is                           



                          considered to be immune                           



                          to infection with HBV. ?                           



[facility] [id number] 
10:57:13





             Test Item    Value        Reference Range Interpretation Comments

 

             HBCM         Negative                               



             Semi-Quantitative                                        



             (test code =                                        



             42416-5)                                            

 

             SKYLER (test code = Biotin has been reported                          

 



             SKYLER)         to cause a negative bias,                           



                          interpret results                           



                          relative to patient's use                           



                          of biotin.                             



[facility] [id number] 
10:57:13





             Test Item    Value        Reference Range Interpretation Comments

 

             HAVM         Negative                               



             Semi-Quantitative                                        



             (test code =                                        



             55189-5)                                            

 

             SKYLER (test code = HAVAb IgM Interpretative                          

 



             SKYLER)         Information: Reactive                           



                          greater than or equal to                           



                          1.2 Biotin has been                           



                          reported to cause a                           



                          negative bias, interpret                           



                          results relative to                           



                          patient's use of biotin.                           



[facility] B SURFACE [id number] 10:52:05





             Test Item    Value        Reference Range Interpretation Comments

 

             HBsAg Semi-Quantitative (test code = Positive     Negative     A   

         



             5195-3)                                             

 

             Lab Interpretation (test code = Abnormal                           

    



             00756-8)                                            



[facility] [id number] 10:30:25





             Test Item    Value        Reference Range Interpretation Comments

 

             ESR (test code =              See_Comment  H             [Automated

 message]



             6568937188)                                         The system [employer]



                                                                 generated this 

result



                                                                 transmitted ref

erence



                                                                 range: 0 - 10 m

m/HR.



                                                                 The reference r

leslie



                                                                 was not used to



                                                                 interpret this 

result



                                                                 as normal/abnor

mal.

 

             Lab Interpretation (test Abnormal                               



             code = 11648-5)                                        



[facility] WITH [id number] 10:27:44





             Test Item    Value        Reference Range Interpretation Comments

 

             HIV          Negative     Negative                  



             Semi-quantitative                                        



             (test code =                                        



             70953-3)                                            

 

             SKYLER (test code = Non-reactive for HIV-1                           



             SKYLER)         antigen and HIV-1/HIV-2                           



                          antibodies. ?No                           



                          laboratory evidence of                           



                          HIV infection. ?Repeat in                           



                          2-4 weeks if acute HIV                           



                          infection is suspected.                           



[facility] (ALB,T.PRO,BILI
T,BU/BC,ALT,AST,ALK PHOS)2022 10:06:02





             Test Item    Value        Reference Range Interpretation Comments

 

             TOTAL BILI (test code = 3437491894) 2.1 mg/dL    0.1-1.1      H    

        

 

             BILI UNCON (test code = 4819454903) 0.5 mg/dL    0.1-1.1           

        

 

             BILI CONJ (test code = 1435040193) 0.4 mg/dL    0.0-0.3      H     

       

 

             T PROTEIN (test code = 5135418248) 7.7 g/dL     6.3-8.2            

       

 

             ALBUMIN (test code = 0991642390) 3.3 g/dL     3.5-5.0      L       

     

 

             ALK PHOS (test code = 5464413813) 544 U/L             H      

      

 

             ALTv (test code = 1742-6) 150 U/L      5-50         H            

 

             AST(SGOT) (test code = 2256501351) 153 U/L      13-40        H     

       

 

             Lab Interpretation (test code = Abnormal                           

    



             69623-8)                                            



[facility] METABOLIC PANEL (NA, K, CL, CO2, 
GLUCOSE, BUN, CREATININE, CA)2022 10:06:02





             Test Item    Value        Reference Range Interpretation Comments

 

             NA (test code = 136 mmol/L   135-145                   



             7558972685)                                         

 

             K (test code = 4.2 mmol/L   3.5-5.0                   



             2249185882)                                         

 

             CL (test code = 106 mmol/L                       



             4257369336)                                         

 

             CO2 TOTAL (test code = 23 mmol/L    23-31                     



             7925598252)                                         

 

             AGAP (test code =              2-16                      



             8512647445)                                         

 

             BUN (test code = 10 mg/dL     7-23                      



             4855464791)                                         

 

             GLUCOSE (test code = 74 mg/dL                         



             4497456429)                                         

 

             CREATININE (test code = 0.78 mg/dL   0.60-1.25                 



             4212577456)                                         

 

             CALCIUM (test code = 7.9 mg/dL    8.6-10.6     L            



             7433818895)                                         

 

             eGFR (test code =              mL/min/1.73m2              



             5071873086)                                         

 

             SKYLER (test code = SKYLER) Association of                           



                          Glomerular Filtration                           



                          Rate (GFR) and Staging                           



                          of Kidney Disease*                           



                          +---------------------                           



                          --+-------------------                           



                          --+-------------------                           



                          ------+| GFR                           



                          (mL/min/1.73 m2) ?|                           



                          With Kidney Damage ?|                           



                          ?Without Kidney                           



                          Damage+---------------                           



                          --------+-------------                           



                          --------+-------------                           



                          ------------+| ?>90 ?                           



                          ? ? ? ? ? ? ? ?|                           



                          ?Stage one ? ? ? ? ?|                           



                          ? Normal ? ? ? ? ? ? ?                           



                          ?+--------------------                           



                          ---+------------------                           



                          ---+------------------                           



                          -------+| ?60-89 ? ? ?                           



                          ? ? ? ? ?| ?Stage two                           



                          ? ? ? ? ?| ? Decreased                           



                          GFR ? ? ? ?                            



                          +---------------------                           



                          --+-------------------                           



                          --+-------------------                           



                          ------+| ?30-59 ? ? ?                           



                          ? ? ? ? ?| ?Stage                           



                          three ? ? ? ?| ? Stage                           



                          three ? ? ? ? ?                           



                          +---------------------                           



                          --+-------------------                           



                          --+-------------------                           



                          ------+| ?15-29 ? ? ?                           



                          ? ? ? ? ?| ?Stage four                           



                          ? ? ? ? | ? Stage four                           



                          ? ? ? ? ?                              



                          ?+--------------------                           



                          ---+------------------                           



                          ---+------------------                           



                          -------+| ?<15 (or                           



                          dialysis) ? ?| ?Stage                           



                          five ? ? ? ? | ? Stage                           



                          five ? ? ? ? ?                           



                          ?+--------------------                           



                          ---+------------------                           



                          ---+------------------                           



                          -------+ *Each stage                           



                          assumes the associated                           



                          GFR level has been in                           



                          effect for at least                           



                          three months. ?Stages                           



                          1 to 5, with or                           



                          without kidney                           



                          disease, indicate                           



                          chronic kidney                           



                          disease. Notes:                           



                          Determination of                           



                          stages one and two                           



                          (with eGFR                             



                          >59mL/min/1.73 m2)                           



                          requires estimation of                           



                          kidney damage for at                           



                          least three months as                           



                          defined by structural                           



                          or functional                           



                          abnormalities of the                           



                          kidney, manifested by                           



                          either:Pathological                           



                          abnormalities or                           



                          Markers of kidney                           



                          damage (including                           



                          abnormalities in the                           



                          composition of the                           



                          blood or urine or                           



                          abnormalities in                           



                          imaging tests).                           

 

             Lab Interpretation Abnormal                               



             (test code = 52872-8)                                        



[id number] 09:54:02





             Test Item    Value        Reference    Interpretation Comments



                                       Range                     

 

             D-DIMER (test code =              See_Comment  H             [Autom

ated



             6349115451)                                         message] The



                                                                 system which



                                                                 generated this



                                                                 result



                                                                 transmitted



                                                                 reference range

:



                                                                 <0.50 ?g/mL



                                                                 (FEU). The



                                                                 reference range



                                                                 was not used to



                                                                 interpret this



                                                                 result as



                                                                 normal/abnormal

.

 

             SKYLER (test code = This test may be                           



             SKYLER)         used in conjunction                           



                          with a clinical                           



                          pretest probability                           



                          (PTP) assessment                           



                          model to exclude                           



                          venous                                 



                          thromboembolism                           



                          (VTE) in patients                           



                          suspected of deep                           



                          venous thrombosis                           



                          (DVT) and pulmonary                           



                          embolism (PE)  A                           



                          D-Dimer value less                           



                          than 0.50 ?g/ml                           



                          (FEU) has a negative                           



                          predicative value of                           



                          96 to 100% (95%                           



                          CI)and 97 to 100%                           



                          (95% CI) as an aid                           



                          in the diagnosis of                           



                          deep vein thrombosis                           



                          (DVT) and pulmonary                           



                          embolism when there                           



                          is low or moderate                           



                          pretest probability                           



                          of PE or DVT.                           



                          D-Dimer values are                           



                          expressed in initial                           



                          fibrinogen                             



                          equivalent units                           



                          (FEU)" The assay                           



                          results should be                           



                          used with other                           



                          information,                           



                          including the                           



                          clinical context, in                           



                          forming a diagnosis.                           



                                                                 

 

             Lab Interpretation Abnormal                               



             (test code =                                        



             34163-9)                                            



[id number]22





             Test Item    Value        Reference Range Interpretation Comments

 

             CK (test code = 5608444489) <20                 L            

 

             Lab Interpretation (test code = Abnormal                           

    



             75173-2)                                            



[facility] [id number] 08:58:33





             Test Item    Value        Reference Range Interpretation Comments

 

             TSH (test code =              See_Comment                [Automated

 message]



             0775297435)                                         The system [employer]



                                                                 generated this 

result



                                                                 transmitted ref

erence



                                                                 range: 0.45 - 4

.70



                                                                 mIU/L. The refe

rence



                                                                 range was not u

sed to



                                                                 interpret this 

result



                                                                 as normal/abnor

mal.

 

             Lab Interpretation (test Normal                                 



             code = 25596-6)                                        



[id number] 08:37:09





             Test Item    Value        Reference Range Interpretation Comments

 

             NT-proBNP (test code 373 pg/mL    See_Comment  H             [Autom

ated



             = 2299459601)                                        message] The



                                                                 system which



                                                                 generated this



                                                                 result



                                                                 transmitted



                                                                 reference range

:



                                                                 <=125. The



                                                                 reference range



                                                                 was not used to



                                                                 interpret this



                                                                 result as



                                                                 normal/abnormal

.

 

             SKYLER (test code = SKYLER) Biotin has been                           



                          reported to                            



                          cause a negative                           



                          bias, interpret                           



                          results relative                           



                          to patient's use                           



                          of biotin.                             

 

             Lab Interpretation Abnormal                               



             (test code = 21529-5)                                        



[facility]12





             Test Item    Value        Reference Range Interpretation Comments

 

             ALCOHOL (test code = <10          mg/dL                     



             7887520307)                                         

 

             SKYLER (test code = Toxic Greater than or                           



             SKYLER)         equal to 80 mg/dL. NOTE:                           



                          Whole blood values are                           



                          approximately 10% to 15%                           



                          lower than serum and                           



                          plasma.                                



[facility]





             Test Item    Value        Reference Range Interpretation Comments

 

             ACETAMINOP (test code = <10.0        10.0-30.0    L            



             2431727300)                                         

 

             SKYLER (test code = SKYLER) Toxic: Greater than                          

 



                          200 ug/mL @ 4 hour                           



                          post ingestion or                           



                          greater than 50                           



                          ug/mL @ 12 hour post                           



                          ingestion                              

 

             Lab Interpretation (test Abnormal                               



             code = 00939-5)                                        



[facility]35





             Test Item    Value        Reference Range Interpretation Comments

 

             LIPASE (test code = 3535371302) 160 U/L      0-220                 

    

 

             Lab Interpretation (test code = Normal                             

    



             53270-4)                                            



[facility] [id number] 02:03:45





             Test Item    Value        Reference Range Interpretation Comments

 

             EBV Mononucleosis Screen (test code Negative     Negative          

        



             = 7808976033)                                        

 

             Lab Interpretation (test code = Normal                             

    



             07602-1)                                            



[id number]56[facility]57





             Test Item    Value        Reference    Interpretation Comments



                                       Range                     

 

             TROPONIN I (test 0.001 ng/mL  See_Comment                [Automated



             code = 8917847588)                                        message] 

The



                                                                 system which



                                                                 generated this



                                                                 result



                                                                 transmitted



                                                                 reference range

:



                                                                 <=0.034. The



                                                                 reference range



                                                                 was not used to



                                                                 interpret this



                                                                 result as



                                                                 normal/abnormal

.

 

             SKYLER (test code = Reference (Normal)                           



             SKYLER)         Range (defined by                           



                          the 99th percentile                           



                          reference limit): <=                           



                          0.034 ng/mL Note:                           



                          Cardiac troponin                           



                          begins to rise 3-4                           



                          hours after the                           



                          onset of ischemia.                           



                          Repeat in 4-6 hours                           



                          if the sample was                           



                          drawn within 3-4                           



                          hours of the onset                           



                          of the symptom and                           



                          found normal.                           



                          Diagnosis of                           



                          myocardial injury is                           



                          made with acute                           



                          changes in cTn                           



                          concentrations with                           



                          at least one serial                           



                          sample above the                           



                          99th percentile                           



                          upper reference                           



                          limit (URL), taken                           



                          together with the                           



                          patient's clinical                           



                          presentation.                           



                          Biotin has been                           



                          reported to cause a                           



                          negative bias,                           



                          interpret results                           



                          relative to                            



                          patient's use of                           



                          biotin.                                

 

             Lab Interpretation Normal                                 



             (test code =                                        



             78025-4)                                            



[facility]. METABOLIC PANEL ([id number])2022 
23:45:32





             Test Item    Value        Reference Range Interpretation Comments

 

             NA (test code = 133 mmol/L   135-145      L            



             6994922876)                                         

 

             K (test code = 4.3 mmol/L   3.5-5.0                   



             7044796707)                                         

 

             CL (test code = 102 mmol/L                       



             9095831800)                                         

 

             CO2 TOTAL (test code = 23 mmol/L    23-31                     



             6518556354)                                         

 

             AGAP (test code =              2-16                      



             9328048197)                                         

 

             BUN (test code = 14 mg/dL     7-23                      



             1309807625)                                         

 

             GLUCOSE (test code = 112 mg/dL           H            



             7877016993)                                         

 

             CREATININE (test code = 0.74 mg/dL   0.60-1.25                 



             0143572286)                                         

 

             TOTAL BILI (test code = 1.2 mg/dL    0.1-1.1      H            



             7678194001)                                         

 

             CALCIUM (test code = 7.9 mg/dL    8.6-10.6     L            



             7040337550)                                         

 

             T PROTEIN (test code = 7.4 g/dL     6.3-8.2                   



             3967631415)                                         

 

             ALBUMIN (test code = 3.2 g/dL     3.5-5.0      L            



             9351519680)                                         

 

             ALK PHOS (test code = 602 U/L             H            



             4155713616)                                         

 

             ALTv (test code = 154 U/L      5-50         H            



             1742-6)                                             

 

             AST(SGOT) (test code = 207 U/L      13-40        H            



             3777077310)                                         

 

             eGFR (test code =              mL/min/1.73m2              



             9846011653)                                         

 

             SKYLER (test code = SKYLER) Association of                           



                          Glomerular Filtration                           



                          Rate (GFR) and Staging                           



                          of Kidney Disease*                           



                          +---------------------                           



                          --+-------------------                           



                          --+-------------------                           



                          ------+| GFR                           



                          (mL/min/1.73 m2) ?|                           



                          With Kidney Damage ?|                           



                          ?Without Kidney                           



                          Damage+---------------                           



                          --------+-------------                           



                          --------+-------------                           



                          ------------+| ?>90 ?                           



                          ? ? ? ? ? ? ? ?|                           



                          ?Stage one ? ? ? ? ?|                           



                          ? Normal ? ? ? ? ? ? ?                           



                          ?+--------------------                           



                          ---+------------------                           



                          ---+------------------                           



                          -------+| ?60-89 ? ? ?                           



                          ? ? ? ? ?| ?Stage two                           



                          ? ? ? ? ?| ? Decreased                           



                          GFR ? ? ? ?                            



                          +---------------------                           



                          --+-------------------                           



                          --+-------------------                           



                          ------+| ?30-59 ? ? ?                           



                          ? ? ? ? ?| ?Stage                           



                          three ? ? ? ?| ? Stage                           



                          three ? ? ? ? ?                           



                          +---------------------                           



                          --+-------------------                           



                          --+-------------------                           



                          ------+| ?15-29 ? ? ?                           



                          ? ? ? ? ?| ?Stage four                           



                          ? ? ? ? | ? Stage four                           



                          ? ? ? ? ?                              



                          ?+--------------------                           



                          ---+------------------                           



                          ---+------------------                           



                          -------+| ?<15 (or                           



                          dialysis) ? ?| ?Stage                           



                          five ? ? ? ? | ? Stage                           



                          five ? ? ? ? ?                           



                          ?+--------------------                           



                          ---+------------------                           



                          ---+------------------                           



                          -------+ *Each stage                           



                          assumes the associated                           



                          GFR level has been in                           



                          effect for at least                           



                          three months. ?Stages                           



                          1 to 5, with or                           



                          without kidney                           



                          disease, indicate                           



                          chronic kidney                           



                          disease. Notes:                           



                          Determination of                           



                          stages one and two                           



                          (with eGFR                             



                          >59mL/min/1.73 m2)                           



                          requires estimation of                           



                          kidney damage for at                           



                          least three months as                           



                          defined by structural                           



                          or functional                           



                          abnormalities of the                           



                          kidney, manifested by                           



                          either:Pathological                           



                          abnormalities or                           



                          Markers of kidney                           



                          damage (including                           



                          abnormalities in the                           



                          composition of the                           



                          blood or urine or                           



                          abnormalities in                           



                          imaging tests).                           

 

             Lab Interpretation Abnormal                               



             (test code = 51247-3)                                        



[facility] WITH [id number] 23:28:46





             Test Item    Value        Reference Range Interpretation Comments

 

             WBC (test code =              See_Comment                [Automated



             6690-2)                                             message] The sy

stem



                                                                 which generated



                                                                 this result



                                                                 transmitted



                                                                 reference range

:



                                                                 4.20 - 10.70



                                                                 10*3/?L. The



                                                                 reference range

 was



                                                                 not used to



                                                                 interpret this



                                                                 result as



                                                                 normal/abnormal

.

 

             RBC (test code =              See_Comment  L             [Automated



             789-8)                                              message] The sy

stem



                                                                 which generated



                                                                 this result



                                                                 transmitted



                                                                 reference range

:



                                                                 4.26 - 5.52



                                                                 10*6/?L. The



                                                                 reference range

 was



                                                                 not used to



                                                                 interpret this



                                                                 result as



                                                                 normal/abnormal

.

 

             HGB (test code = 11.3 g/dL    12.2-16.4    L            



             718-7)                                              

 

             HCT (test code = 34.0 %       38.4-49.3    L            



             4544-3)                                             

 

             MCV (test code = 90.9 fL      81.7-95.6                 



             787-2)                                              

 

             MCH (test code = 30.2 pg      26.1-32.7                 



             785-6)                                              

 

             MCHC (test code = 33.2 g/dL    31.2-35.0                 



             786-4)                                              

 

             RDW-SD (test code = 59.5 fL      38.5-51.6    H            



             88871-9)                                            

 

             RDW-CV (test code = 17.9 %       12.1-15.4    H            



             788-0)                                              

 

             PLT (test code =              See_Comment  H             [Automated



             777-3)                                              message] The sy

stem



                                                                 which generated



                                                                 this result



                                                                 transmitted



                                                                 reference range

:



                                                                 150 - 328 10*3/

?L.



                                                                 The reference r

leslie



                                                                 was not used to



                                                                 interpret this



                                                                 result as



                                                                 normal/abnormal

.

 

             MPV (test code = 9.8 fL       9.8-13.0                  



             40895-8)                                            

 

             NRBC/100 WBC (test              See_Comment                [Automat

ed



             code = 6835919723)                                        message] 

The system



                                                                 which generated



                                                                 this result



                                                                 transmitted



                                                                 reference range

:



                                                                 0.0 - 10.0 /100



                                                                 WBCs. The refer

ence



                                                                 range was not u

sed



                                                                 to interpret th

is



                                                                 result as



                                                                 normal/abnormal

.

 

             NRBC x10^3 (test code <0.01        See_Comment                [Auto

mated



             = [id number])                                        message] The s

ystem



                                                                 which generated



                                                                 this result



                                                                 transmitted



                                                                 reference range

:



                                                                 10*3/?L. The



                                                                 reference range

 was



                                                                 not used to



                                                                 interpret this



                                                                 result as



                                                                 normal/abnormal

.

 

             GRAN MAT (NEUT) % 74.8 %                                 



             (test code = 770-8)                                        

 

             IMM GRAN % (test code 1.20 %                                 



             = [id number])                                        

 

             LYMPH % (test code = 13.3 %                                 



             736-9)                                              

 

             MONO % (test code = 8.4 %                                  



             5905-5)                                             

 

             EOS % (test code = 1.5 %                                  



             713-8)                                              

 

             BASO % (test code = 0.8 %                                  



             706-2)                                              

 

             GRAN MAT x10^3(ANC) 5.58 10*3/uL 1.99-6.95                 



             (test code =                                        



             6905177223)                                         

 

             IMM GRAN x10^3 (test 0.09 10*3/uL 0.00-0.06    H            



             code = 0790852944)                                        

 

             LYMPH x10^3 (test code 0.99 10*3/uL 1.09-3.23    L            



             = 731-0)                                            

 

             MONO x10^3 (test code 0.63 10*3/uL 0.36-1.02                 



             = 742-7)                                            

 

             EOS x10^3 (test code = 0.11 10*3/uL 0.06-0.53                 



             711-2)                                              

 

             BASO x10^3 (test code 0.06 10*3/uL 0.01-0.09                 



             = 704-7)                                            

 

             Lab Interpretation Abnormal                               



             (test code = 85974-4)                                        



[facility]
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
## 2022-04-19 NOTE — RAD REPORT
EXAM DESCRIPTION:  RAD - Forearm Left - 4/19/2022 6:42 am

 

CLINICAL HISTORY:  SWELLING

Pain and swelling

 

COMPARISON:  No comparisons

 

FINDINGS:  No fracture or dislocation is seen.
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
## 2022-04-19 NOTE — EDPHYS
Physician Documentation                                                                           

 [facility]                                                                 

Name: Shawn Rocha                                                                               

Age: 32 yrs                                                                                       

Sex: Male                                                                                         

: 1990                                                                                   

MRN: [id number]                                                                                   

Arrival Date: 2022                                                                          

Time: 05:51                                                                                       

Account#: [id number]                                                                            

Bed 4                                                                                             

Private MD:                                                                                       

ED Physician Mary Blackman                                                                    

HPI:                                                                                              

                                                                                             

09:42 This 32 yrs old Male presents to ER via Wheelchair with complaints of Syncope, Arm      ma2 

      Problem.                                                                                    

09:42 32-year-old female, was involved in a head trauma 2 days ago had epidural hematoma was  ma2 

      transferred to [facility], he was observed there and discharged yesterday. GCS        

      was 15. He also has multiple facial fractures that he saw maxillofacial surgery at          

      [facility] and is doing outpatient follow-up for. Patient presents with left arm      

      pain, he said that they have tried multiple IV access at the left arm and is having         

      forearm pain at the IV sites, the area is also red and tender. Mildly warm. Of note         

      patient denies headache. Today he was in the bathroom urinating and during that time he     

      had a syncope where he passed out and lost consciousness. For few seconds. Regained         

      consciousness spontaneously. At this time patient is not having any symptoms other than     

      left arm pain. He denies shortness of breath or chest pain..                                

                                                                                                  

Historical:                                                                                       

- Allergies:                                                                                      

06:10 No Known Allergies;                                                                     bb  

- PMHx:                                                                                           

06:10 drug abuse; HEP C;                                                                      bb  

- PSHx:                                                                                           

06:10 Abscess Drain from RUQ;                                                                 bb  

                                                                                                  

- Immunization history:: Adult Immunizations up to date, Client reports receiving the             

  2nd dose of the Covid vaccine, moderna X2.                                                      

- Social history:: Smoking status: Patient reports the use of cigarette tobacco                   

  products, smokes one-half pack cigarettes per day, Patient/guardian denies using                

  alcohol, street drugs.                                                                          

- Family history:: not pertinent.                                                                 

                                                                                                  

                                                                                                  

ROS:                                                                                              

09:42 Constitutional: Negative for fever, chills, and weight loss.                            ma2 

09:42 All other systems are negative.                                                             

                                                                                                  

Exam:                                                                                             

09:42 Abdomen/GI: Exam negative for                                                           ma2 

09:42 Constitutional:  This is a well developed, well nourished patient who is awake, alert,      

      and in no acute distress. Head/Face:  Has raccoon eyes, facial abrasions, otherwise         

      normocephalic, atraumatic. Eyes:  Pupils equal round and reactive to light,                 

      extra-ocular motions intact.  Lids and lashes normal.  Conjunctiva and sclera are           

      non-icteric and not injected.  Cornea within normal limits.  Periorbital areas with no      

      swelling, redness, or edema. ENT:  Nares patent. No nasal discharge, no septal              

      abnormalities noted.  Tympanic membranes are normal and external auditory canals are        

      clear.  Oropharynx with no redness, swelling, or masses, exudates, or evidence of           

      obstruction, uvula midline.  Mucous membranes moist. Neck:  Trachea midline, no             

      thyromegaly or masses palpated, and no cervical lymphadenopathy.  Supple, full range of     

      motion without nuchal rigidity, or vertebral point tenderness.  No Meningismus.             

      Chest/axilla:  Normal chest wall appearance and motion.  Nontender with no deformity.       

      No lesions are appreciated. Cardiovascular:  Regular rate and rhythm with a normal S1       

      and S2.  No gallops, murmurs, or rubs.  Normal PMI, no JVD.  No pulse deficits.             

      Respiratory:  Lungs have equal breath sounds bilaterally, clear to auscultation and         

      percussion.  No rales, rhonchi or wheezes noted.  No increased work of breathing, no        

      retractions or nasal flaring. Abdomen/GI:  Soft, non-tender, with normal bowel sounds.      

      No distension or tympany.  No guarding or rebound.  No evidence of tenderness               

      throughout. Back:  No spinal tenderness.  No costovertebral tenderness.  Full range of      

      motion. Skin:  Warm, dry with normal turgor.  Normal color with no rashes, no lesions,      

      and no evidence of cellulitis. MS/ Extremity:  There is tenderness over IV site in the      

      left forearm, otherwise the upper extremities not edematous, pulses intact at radial        

      and ulnar pulses, cap refill brisk, mild edema and erythema however not consistent with     

      cellulitis.  Pulses equal, no cyanosis.  Neurovascular intact.  Full, normal range of       

      motion. Neuro:  Awake and alert, GCS 15, oriented to person, place, time, and               

      situation.  Cranial nerves II-XII grossly intact.  Motor strength 5/5 in all                

      extremities.  Sensory grossly intact.  Cerebellar exam normal.  Normal gait. Psych:         

      Awake, alert, with orientation to person, place and time.  Behavior, mood, and affect       

      are within normal limits.                                                                   

                                                                                                  

Vital Signs:                                                                                      

06:04  / 84; Pulse 97; Resp 14; Temp 98.9(TE); Pulse Ox 98% on R/A; Weight 68.95 kg     bb  

      (R); Height 5 ft. 8 in. (172.72 cm) (R); Pain 8/10;                                         

09:23  / 75; Pulse 76; Resp 18; Pulse Ox 99% on R/A;                                    ha  

06:04 Body Mass Index 23.11 (68.95 kg, 172.72 cm)                                             bb  

                                                                                                  

MDM:                                                                                              

09:42 Differential Diagnosis: Patient had syncope while urinating, left upper extremity       ma2 

      ultrasound shows basilar vein thrombosis, clinically he also had thrombophlebitis. This     

      is superficial vein thrombosis associated with IV. IV is removed at this time, will         

      treat for thrombophlebitis. No indication for anticoagulation especially that he had        

      epidural hematoma. Either way this is not a PE although he had a syncope it was while       

      urinating and it was associated with facial pain and therefore this is likely vasovagal     

      attack, he does not have shortness of breath or chest pain or any other respiratory         

      symptoms his SPO2 is 99 on room air. Never had PE. Although this is a superficial vein      

      thrombosis, there is risk of propagating proximally to either PE or DVT, I made patient     

      aware of those risk and to return to ER immediately for any worsening of left arm           

      swelling or pain or any respiratory symptoms. Had lengthy discussion and clear return       

      precaution patient articulated understanding..                                              

09:47 Patient medically screened.                                                             ma2 

                                                                                                  

                                                                                             

06:29 Order name: CBC with Diff; Complete Time: 09:16                                         kdr 

                                                                                             

06:29 Order name: Chem 7; Complete Time: 08:                                                kdr 

                                                                                             

06:27 Order name: CT Head C Spine; Complete Time: 09:                                       kdr 

                                                                                             

06:29 Order name: PT-INR; Complete Time: 08:                                                kdr 

                                                                                             

08:50 Order name: Manual Differential; Complete Time: 09:16                                   EDMS

                                                                                             

06:34 Order name: XRAY Forearm LEFT; Complete Time: 09:16                                     vc1 

                                                                                             

06:37 Order name: UPPER EXTREMITY VENOUS UNILATE; Complete Time: 09:16                        EDMS

                                                                                                  

Administered Medications:                                                                         

08:20 Drug: NS 0.9% 1000 ml Route: IV; Rate: 1 bolus; Site: right antecubital;                ha  

08:20 Drug: morphine 4 mg Route: IVP; Site: right antecubital;                                ha  

08:49 Follow up: Response: No adverse reaction                                                ha  

08:46 Drug: Zofran (Ondansetron) 4 mg Route: IVP; Site: right antecubital;                    ha  

08:49 Follow up: Response: No adverse reaction                                                ha  

                                                                                                  

                                                                                                  

Disposition Summary:                                                                              

22 09:47                                                                                    

Discharge Ordered                                                                                 

      Location: Home                                                                          ma2 

      Condition: Stable                                                                       ma2 

      Diagnosis                                                                                   

        - Phlebitis and thrombophlebitis of other sites                                       ma2 

      Followup:                                                                               ma2 

        - With: Private Physician                                                                  

        - When: Tomorrow                                                                           

        - Reason: If symptoms return, Continuance of care                                          

      Discharge Instructions:                                                                     

        - Discharge Summary Sheet                                                             ma2 

        - Phlebitis, Easy-to-Read                                                             ma2 

      Forms:                                                                                      

        - Medication Reconciliation Form                                                      ma2 

        - Thank You Letter                                                                    ma2 

        - Antibiotic Education                                                                ma2 

        - Prescription Opioid Use                                                             ma2 

      Prescriptions:                                                                              

        - ketorolac 10 mg Oral tablet                                                              

            - take 1 tablet by ORAL route every 4-6 hours not to exceed 40 mg in 24hrs; 30    ma2 

      tablet; Refills: 0, Product Selection Permitted                                             

        - Clindamycin HCl 300 mg Oral Capsule                                                      

            - take 1 capsule by ORAL route every 6 hours for 10 days; 40 capsule; Refills: 0, ma2 

      Product Selection Permitted                                                                 

Signatures:                                                                                       

Dispatcher [employer]                           EDMS                                                 

Kim Bajwa RN                     RN   Mary Collins MD MD   [facility]                                                  

Au-StagerMelody RN RN                                                      

                                                                                                  

Corrections: (The following items were deleted from the chart)                                    

06:37 06:28 Extremity Venous Uni Ltd+US.RAD.BRZ ordered. EDMS                                 EDMS

06:43 06:28 Forearm Right+RAD.RAD.BRZ ordered. EDMS                                           EDMS

                                                                                                  

**************************************************************************************************

## 2022-04-19 NOTE — RAD REPORT
EXAM DESCRIPTION:  US - UPPER EXTREMITY VENOUS UNILATE - 4/19/2022 7:07 am

 

CLINICAL HISTORY:  Pain

Arm pain and swelling

 

COMPARISON:  No comparisons

 

FINDINGS:  Left upper extremity venous system was interrogated with Doppler technique. Thrombus is no
weston in the left basilic vein. Elsewhere, no left upper extremity DVT seen

 

IMPRESSION:  Positive for thrombus in the left basilic vein.

## 2022-04-22 ENCOUNTER — HOSPITAL ENCOUNTER (INPATIENT)
Dept: HOSPITAL 97 - ER | Age: 32
LOS: 4 days | Discharge: HOME | DRG: 300 | End: 2022-04-26
Attending: INTERNAL MEDICINE | Admitting: HOSPITALIST
Payer: SELF-PAY

## 2022-04-22 VITALS — BODY MASS INDEX: 23.1 KG/M2

## 2022-04-22 DIAGNOSIS — G44.309: ICD-10-CM

## 2022-04-22 DIAGNOSIS — Z87.820: ICD-10-CM

## 2022-04-22 DIAGNOSIS — J01.40: ICD-10-CM

## 2022-04-22 DIAGNOSIS — L03.114: ICD-10-CM

## 2022-04-22 DIAGNOSIS — Z20.822: ICD-10-CM

## 2022-04-22 DIAGNOSIS — F17.200: ICD-10-CM

## 2022-04-22 DIAGNOSIS — I80.8: Primary | ICD-10-CM

## 2022-04-22 DIAGNOSIS — Z86.19: ICD-10-CM

## 2022-04-22 LAB
ALBUMIN SERPL BCP-MCNC: 3.4 G/DL (ref 3.4–5)
ALP SERPL-CCNC: 145 U/L (ref 45–117)
ALT SERPL W P-5'-P-CCNC: 167 U/L (ref 12–78)
AST SERPL W P-5'-P-CCNC: 69 U/L (ref 15–37)
BUN BLD-MCNC: 8 MG/DL (ref 7–18)
GLUCOSE SERPLBLD-MCNC: 104 MG/DL (ref 74–106)
HCT VFR BLD CALC: 40.5 % (ref 39.6–49)
INR BLD: 0.99
LYMPHOCYTES # SPEC AUTO: 1.5 K/UL (ref 0.7–4.9)
PMV BLD: 7.2 FL (ref 7.6–11.3)
POTASSIUM SERPL-SCNC: 4.2 MMOL/L (ref 3.5–5.1)
RBC # BLD: 4.6 M/UL (ref 4.33–5.43)

## 2022-04-22 PROCEDURE — 99285 EMERGENCY DEPT VISIT HI MDM: CPT

## 2022-04-22 PROCEDURE — 80053 COMPREHEN METABOLIC PANEL: CPT

## 2022-04-22 PROCEDURE — 96375 TX/PRO/DX INJ NEW DRUG ADDON: CPT

## 2022-04-22 PROCEDURE — 83970 ASSAY OF PARATHORMONE: CPT

## 2022-04-22 PROCEDURE — 93971 EXTREMITY STUDY: CPT

## 2022-04-22 PROCEDURE — 36415 COLL VENOUS BLD VENIPUNCTURE: CPT

## 2022-04-22 PROCEDURE — 85610 PROTHROMBIN TIME: CPT

## 2022-04-22 PROCEDURE — 96372 THER/PROPH/DIAG INJ SC/IM: CPT

## 2022-04-22 PROCEDURE — 80202 ASSAY OF VANCOMYCIN: CPT

## 2022-04-22 PROCEDURE — 85025 COMPLETE CBC W/AUTO DIFF WBC: CPT

## 2022-04-22 PROCEDURE — 70450 CT HEAD/BRAIN W/O DYE: CPT

## 2022-04-22 PROCEDURE — 96365 THER/PROPH/DIAG IV INF INIT: CPT

## 2022-04-22 RX ADMIN — MORPHINE SULFATE PRN MG: 4 INJECTION, SOLUTION INTRAMUSCULAR; INTRAVENOUS at 16:53

## 2022-04-22 RX ADMIN — MORPHINE SULFATE PRN MG: 4 INJECTION, SOLUTION INTRAMUSCULAR; INTRAVENOUS at 21:13

## 2022-04-22 RX ADMIN — SODIUM CHLORIDE SCH MLS: 9 INJECTION, SOLUTION INTRAVENOUS at 18:00

## 2022-04-22 RX ADMIN — SODIUM CHLORIDE SCH MLS: 9 INJECTION, SOLUTION INTRAVENOUS at 16:45

## 2022-04-22 RX ADMIN — Medication SCH ML: at 21:52

## 2022-04-22 RX ADMIN — SODIUM CHLORIDE SCH MLS: 0.9 INJECTION, SOLUTION INTRAVENOUS at 16:57

## 2022-04-22 RX ADMIN — ACETAMINOPHEN PRN MG: 500 TABLET, FILM COATED ORAL at 19:46

## 2022-04-22 NOTE — XMS REPORT
Continuity of Care Document

                            Created on:2022



Patient:SUSY ALFARO

Sex:Male

:1990

External Reference #:[id number]





Demographics







                          Address                   [address]



                                                    [place] [address]

 

                          Home Phone                [phone]

 

                          Mobile Phone              [phone]

 

                          Email Address             [address]

 

                          Preferred Language        en

 

                          Marital Status            Unknown

 

                          Zoroastrian Affiliation     Unknown

 

                          Race                      Unknown

 

                          Additional Race(s)        White

 

                          Ethnic Group              Not  or 









Author







                          Organization              [facility]

t

 

                          Address                   [address]



                                                    [place] [address]

 

                          Phone                     [phone]









Support







                Name            Relationship    Address         Phone

 

                Naveen           Sibling         Unavailable     [phone]









Care Team Providers







                    Name                Role                Phone

 

                    Pcp,  Does Not Have A Primary Care Physician [phone]

 

                    Jelani MORAN           Attending Clinician [phone]

 

                    STEFANIE RAMIREZ       Attending Clinician Unavailable

 

                    Sadaf MOYER,  B    Attending Clinician [phone]

 

                    Dalton ARNDT,  C   Attending Clinician [phone]

 

                    Stefanie Ramirez MD    Attending Clinician [phone]

 

                    Kate Wharton MD   Attending Clinician [phone]

 

                    Therapy,  Covid Infusion Attending Clinician Unavailable

 

                    Stan ARNDT,  AYESHA        Attending Clinician [phone]

 

                    AYESHA PIERCE           Attending Clinician Unavailable

 

                    Doctor Unassigned,  Name Attending Clinician Unavailable

 

                    KATE WHARTON      Admitting Clinician Unavailable

 

                    Kate Wharton MD   Admitting Clinician [phone]









Problems







       Condition Condition Condition Status Onset  Resolution Last   Treating Co

mments 

Source



       Name   Details Category        Date   Date   Treatment Clinician        



                                                 Date                 

 

       Lip lesion Lip lesion Disease Active                              U

nivers



                                   1-11                               ity of



                                   00:00:                             [address]

 

       Syphilis, Syphilis, Disease Active                              Uni

vers



       secondary secondary               -07                               ity 

of



                                   00:00:                             [address]

 

       Chronic Chronic Disease Active                              [facility]



       hepatitis hepatitis               -07                               ity 

of



       B virus B virus               00:00:                             [place]



       infection infection               [address] [place] [address]

 

       Acute  Acute  Disease Active                              [facility]



       hyponatrem hyponatrem               -07                               it

y of



       ia     ia                   00:00:                             [address]

 

       Jarisch Jarisch Disease Active                              [facility]



       Herxheimer Herxheimer               1-07                               it

y of



       reaction reaction               00:00:                             [place]



                                   00                                 Medical



                                                                      Branch

 

       Chronic Chronic Disease Active                              [facility]



       hepatitis hepatitis                                              ity 

of



       C without C without               00:00:                             [place]

[place]



       hepatic hepatic                                                Medical



       coma   coma                                                    Branch

 

       Reactive Reactive Disease Active                              Unive

rs



       cervical cervical                                              ity of



       lymphadeno lymphadeno               00:00:                             [place]

[place]



       linda  linda                                                 Medical



                                                                      Branch







Allergies, Adverse Reactions, Alerts







       Allergy Allergy Status Severity Reaction(s) Onset  Inactive Treating Comm

ents 

Source



       Name   Type                        Date   Date   Clinician        

 

       NO KNOWN Drug   Active                                           [facility]



       ALLERGIE Class                                                   ity of



       S                                                              [facility]







Social History







           Social Habit Start Date Stop Date  Quantity   Comments   Source

 

           Exposure to                       Not sure              [facility]

 [place]



           SARS-CoV-2 (event)                                             Medica

l Branch

 

           Sex Assigned At 1990                       [facility]



           Birth      00:00:00   00:00:00                         [facility]









                Smoking Status  Start Date      Stop Date       Source

 

                Unknown if ever smoked                                 [facility]







Medications







       Ordered Filled Start  Stop   Current Ordering Indication Dosage Frequency

 Signature

                    Comments            Components          Source



     Medication Medication Date Date Medication? Clinician                (SIG) 

          



     Name Name                                                   

 

     bisacodyL       No             10mg      10 mg,           Unive

rs



     (DULCOLAX)                                Rectal,           ity o

f



     suppository      20:45: 20:55                          ONCE, 1           Te

x[facility]



     10 mg      00   :00                           dose, On           Medical



                                                  [facility]



                                                  22 at           



                                                  1445,           



                                                  Routine           

 

     enoxaparin            Yes            40mg      40 mg,           Unive

rs



     (LOVENOX)                                     Subcutaneo           ity 

of



     injection      15:00:                               us, DAILY,           Te

xas



     40 mg      00                                 First dose           Medical



                                                  on [facility]



                                                  22 at           



                                                  0900,           



                                                  Until           



                                                  Discontinu           



                                                  ed,            



                                                  Routine           

 

     acetaminoph      2022- No        4647 1{tbl}      Take 1           U

nivers



     en-codeine                                tablet by           ity

 of



     300-30 mg      00:00: 05:59                          mouth           [place]



     tablet      00   :00                           every 8           Medical



                                                  (eight)           Branch



                                                  hours as           



                                                  needed for           



                                                  Pain           



                                                  (scale           



                                                  7-10) for           



                                                  up to 7           



                                                  days.           



                                                  Indication           



                                                  s: acute           



                                                  pain           

 

     acetaminoph      2022- No        4647 1{tbl}      Take 1           U

nivers



     en-codeine                                tablet by           ity

 of



     300-30 mg      00:00: 05:59                          mouth           [place]



     tablet      00   :00                           every 8           Medical



                                                  (eight)           Branch



                                                  hours as           



                                                  needed for           



                                                  Pain           



                                                  (scale           



                                                  7-10) for           



                                                  up to 7           



                                                  days.           



                                                  Indication           



                                                  s: acute           



                                                  pain           

 

     acyclovir      2022- No        [id number] 400mg      Take 2           

[facility]



     200 mg                                capsules           ity of



     capsule      00:00: 05:59                          by mouth           [place]



               00   :00                           every 8           Medical



                                                  (eight)           Branch



                                                  hours for           



                                                  5 days.           

 

     acyclovir      2022- No        [id number] 400mg      Take 2           

[facility]



     200 mg                                capsules           ity of



     capsule      00:00: 05:59                          by mouth           [place]



               00   :00                           every 8           Medical



                                                  (eight)           Branch



                                                  hours for           



                                                  5 days.           

 

     acyclovir      2022- No        [id number] 400mg      Take 2           

[facility]



     200 mg                                capsules           ity of



     capsule      00:00: 00:00                          by mouth           [place]



               00   :00                           every 8           Medical



                                                  (eight)           Branch



                                                  hours for           



                                                  7 days.           

 

     acetaminoph      -0      Yes            650mg      650 mg,           Un

neeraj



     en        1-10                               Oral,           ity of



     (TYLENOL)      22:01:                               Q8HPRN,           [place]



     tablet 650      04                                 Starting           Medic

al



     mg                                           on Mon           Branch



                                                  1/10/22 at           



                                                  1601,           



                                                  Until           



                                                  Discontinu           



                                                  ed,            



                                                  Routine,           



                                                  Pain           



                                                  (scale           



                                                  1-3)           

 

     HYDROcodone      -0      Yes            1{tbl}      1 tablet,          

 [facility]



     -acetaminop      1-10                               Oral,           ity of



     hen (NORCO      22:00:                               Q6HPRN,           [facility]

s



     5) 5-325 mg      00                                 Starting           Medi

pedro



     tablet 1                                         on 



     tablet                                         1/10/22 at           



                                                  1600,           



                                                  Until           



                                                  Discontinu           



                                                  ed,            



                                                  Routine,           



                                                  Pain           



                                                  (scale           



                                                  4-6), Pain           



                                                  (scale           



                                                  7-10)           

 

     morpHINE      -0      Yes            4mg       4 mg, Slow           Uni

vers



     injection 4      1-10                               IV Push,           ity 

of



     mg        22:00:                               Q4HPRN,           [place]



               00                                 Starting           Medical



                                                  on Mon           Branch



                                                  1/10/22 at           



                                                  1600,           



                                                  Until           



                                                  Discontinu           



                                                  ed,            



                                                  Routine,           



                                                  can give w           



                                                  norco if           



                                                  neccesary           



                                                  for            



                                                  breakthrou           



                                                  gh pain           

 

     glycerin/mi      -0      Yes            225mL      225 mL,           Un

neeraj



     neral oil      1-10                               Rectal,           ity of



     (AGLO      21:58:                               PRN,           [place]



     ENEMA)      57                                 Starting           Medical



     (COMPOUNDED                                         on 



     ) Enem 225                                         1/10/22 at           



     mL                                           1558,           



                                                  Until           



                                                  Discontinu           



                                                  ed,            



                                                  Routine,           



                                                  Constipati           



                                                  on             



                                                  unresolved           



                                                  by oral           



                                                  medication           



                                                  s              

 

     magnesium            Yes            30mL      30 mL,           Univer

s



     hydroxide      -10                               Oral, BID,           ity 

of



     (MILK OF      02:00:                               First dose           [facility]

as



     MAGNESIA)      00                                 on [facility]           Medical



     400 mg/5 mL                                         22 at           [facility]

[facility]



     suspension                                         2000,           



     30 mL                                         Until           



                                                  Discontinu           



                                                  ed,            



                                                  Routine           

 

     bisacodyL      2022- No             10mg      10 mg,           Unive

rs



     (DULCOLAX)      1-10 01-10                          Oral,           ity of



     tablet 10      00:45: 14:11                          DAILY, 2           [facility]

as



     mg        00   :00                           doses,           Medical



                                                  First dose           Branch



                                                  on Sun           



                                                  22 at           



                                                  1845, Last           



                                                  dose on           



                                                  Mon            



                                                  1/10/22 at           



                                                  0900,           



                                                  Routine           

 

     acyclovir            Yes            400mg      400 mg,           Univ

ers



     (ZOVIRAX)      09                               Oral, Q8H,           ity 

of



     capsule 400      12:00:                               First dose           

[place]



     mg        00                                 on [facility]



                                                  22 at           Branch



                                                  0600,           



                                                  Until           



                                                  Discontinu           



                                                  ed, ASAP           

 

     doxycycline       No             100mg      100 mg,           U

nivers



     hyclate      11                          Oral,           ity of



     (Vibramycin      12:00: 08:08                          Q12HA2,           Te

xas



     ) capsule      00   :27                           First dose           Medi

pedro



     100 mg                                         on [facility]



                                                  22 at           



                                                  0600,           



                                                  Until           



                                                  Discontinu           



                                                  ed,            



                                                  ASAP<br>Re           



                                                  ason for           



                                                  Anti-Infec           



                                                  tive:           



                                                  Documented           



                                                  Infection<           



                                                  br>Documen           



                                                  weston            



                                                  Infection           



                                                  Site:           



                                                  Other<br>O           



                                                  ther site:           



                                                  syphillis<           



                                                  br>Duratio           



                                                  n of           



                                                  Therapy:           



                                                  10 days           

 

     nicotine            Yes            1{patch      1 Patch,           Un

neeraj



     (NICODERM)      -09                     }         Topical,           ity o

f



     21 mg/24 hr      01:00:                               Administer           

[place]



     patch 1      00                                 over 24           Medical



     Patch                                         Hours,           Branch



                                                  Q24H,           



                                                  First dose           



                                                  on Sat           



                                                  22 at           



                                                  1900,           



                                                  Until           



                                                  Discontinu           



                                                  ed,            



                                                  Routine           

 

     penicillin      -2022- No             310       3,000,000           U

nivers



     GK 3       01-09                          Units (3           ity of



     million      03:30: 05:37                          Million           [place]



     units in NS      00   :29                           Units), IV           Me

dical



     50 mL IV                                         Piggyback,           Branc

h



     infusion                                         Q4H ABX,           



     (CNR)                                         First dose           



                                                  on Fri           



                                                  22 at           



                                                  2130,           



                                                  Until           



                                                  Discontinu           



                                                  ed,            



                                                  Administer           



                                                  over 60           



                                                  Minutes,           



                                                  50             



                                                  mL<br>Reas           



                                                  on for           



                                                  Anti-Infec           



                                                  tive:           



                                                  Empiric           



                                                  Therapy           



                                                  for            



                                                  Suspected           



                                                  Infection<           



                                                  br>Empiric           



                                                  Therapy           



                                                  Site:           



                                                  Blood<br>D           



                                                  uration of           



                                                  therapy: 7           



                                                  days           

 

     bisacodyL       No             5mg       5 mg,           Univer

s



     (DULCOLAX)                                Oral,           ity of



     tablet 5 mg      02:45: 13:52                          DAILY, 2           [facility]

[facility]



               00   :00                           doses,           Medical



                                                  First dose           [facility]



                                                  on 22 at           



                                                  2045, Last           



                                                  dose on           



                                                  Sat 22           



                                                  at 0900,           



                                                  Routine           

 

     lactated       No             1000mL      at 100           Univ

ers



     ringers IV      -09                          mL/hr,           ity of



     infusion      00:30: 00:29                          1,000 mL,           [facility]

as



     1,000 mL      00   :00                           IV             Medical



                                                  Infusion,           [facility]



                                                  CONTINUOUS           



                                                  , Starting           



                                                  on 22 at           



                                                  1830,           



                                                  Until Sat           



                                                  22 at           



                                                  1829, ASAP           

 

     hydrOXYzine            Yes            25mg      25 mg,           Univ

ers



     (ATARAX)                                     Oral,           ity of



     tablet 25      23:27:                               Q6HPRN,           [place]



     mg        50                                 Starting           Medical



                                                  on Fri           [facility]



                                                  22 at           



                                                  1727,           



                                                  Until           



                                                  Discontinu           



                                                  ed,            



                                                  Routine,           



                                                  Anxiety           

 

     FENTanyl PF       No             50ug      50 mcg,           Un

neeraj



     (SUBLIMAZE                                Slow IV           ity o

f



     (PF))      23:20: 00:14                          Push,           [place]



     injection      00   :00                           ONCE, 1           Medical



     50 mcg                                         dose, On           [facility]



                                                  Fri 22           



                                                  at 1730,           



                                                  Routine           

 

     piperacilli      2022- No             4.5g      4.5 g, IV           

[facility]



     n-tazobacta                                Piggyback,           i

ty of



     m (ZOSYN)      22:45: 23:03                          Q6H ABX,           [facility]

as



     4.5 g in      00   :49                           First dose           Medic

al



     NaCl 0.9%                                         on Fri           [facility]



     (NS) 100 mL                                         22 at           



     MINI-BAG                                         1645,           



                                                  Until           



                                                  Discontinu           



                                                  ed,            



                                                  Administer           



                                                  over 30           



                                                  Minutes,           



                                                  100            



                                                  mL<br>Reas           



                                                  on for           



                                                  Anti-Infec           



                                                  tive:           



                                                  Documented           



                                                  Infection<           



                                                  br>Documen           



                                                  weston            



                                                  Infection           



                                                  Site:           



                                                  Abdominal<           



                                                  br>Duratio           



                                                  n of           



                                                  Therapy: 7           



                                                  days           

 

     sennosides-            Yes            1{tbl}      1 tablet,          

 [facility]



     docusate                                     Oral,           ity of



     sodium      21:45:                               DAILY,           [place]



     (SENOKOT-S)      00                                 First dose           Me

dical



     8.6-50 mg                                         on 



     per tablet                                         22 at           



     1 tablet                                         1545,           



                                                  Until           



                                                  Discontinu           



                                                  ed,            



                                                  Routine           

 

     polyethylen            Yes            17g       17 g,           Unive

rs



     e glycol      07                               Oral, BID,           ity o

f



     3350 powder      21:45:                               First dose           

[place]



     17 g      00                                 on Fri           [facility]



                                                  22 at           [facility]



                                                  1545,           



                                                  Until           



                                                  Discontinu           



                                                  ed,            



                                                  Routine           

 

     morpHINE      2022- No             6mg       6 mg, Slow           Un

neeraj



     injection 6      10                          IV Push,           ity

 of



     mg        21:31: 21:58                          Q3HPRN,           [place]



               05   :01                           Starting           Medical



                                                  on Fri           [facility]



                                                  22 at           



                                                  1531,           



                                                  Until Mon           



                                                  1/10/22 at           



                                                  1558,           



                                                  Routine,           



                                                  Pain           



                                                  (scale           



                                                  4-6), Pain           



                                                  (scale           



                                                  7-10), can           



                                                  give w           



                                                  norco if           



                                                  neccesary           



                                                  for            



                                                  breakthrou           



                                                  gh pain           

 

     ondansetron            Yes            4mg       4 mg, Slow           

[facility]



     (ZOFRAN                                     IV Push,           ity of



     (PF))      21:30:                               Q6HPRN,           [place]



     injection 4      02                                 Starting           Medi

pedro



     mg                                           on Fri           [facility]



                                                  22 at           



                                                  1530,           



                                                  Until           



                                                  Discontinu           



                                                  ed,            



                                                  Routine,           



                                                  Nausea and           



                                                  Vomiting           



                                                  (N/V)           

 

     morpHINE      2022- No                       PRN,           [facility]



     injection                                Starting           ity o

f



               19:16: 21:29                          on Fri           [place]



               10   :46                           22 at           Medical



                                                  1316,           Branch



                                                  Until 22 at           



                                                  1529,           



                                                  Routine           

 

     tc        2022- No        [id number] [facility]      10             [facility]



     [id number]                                millicurie           i

ty of



     amy       16:15: 17:45                          ,              [place]



     injection      00   :00                           Intravenou           Medi

pedro



     10                                           s, ONCE, 1           Branch



     millicurie                                         dose, On           



                                                  Fri 22           



                                                  at 1015,           



                                                  Routine           

 

     penicillin      2022- No             2.410      2.4            Unive

rs



     g                                   [place]illion           ity of



     benzathine      06:15: 08:32                          Units,           [place]

[place]



     (BICILLIN      00   :00                           Intramuscu           Medi

pedro



     L-A)                                         lar, ONCE,           [facility]



     injection                                         1 dose, On           



     2.4 Million                                         Fri 22           



     Units                                         at 0015,           



                                                  ASAP<br>Re           



                                                  ason for           



                                                  Anti-Infec           



                                                  tive:           



                                                  Documented           



                                                  Infection<           



                                                  br>Documen           



                                                  weston            



                                                  Infection           



                                                  Site:           



                                                  Blood<br>D           



                                                  uration of           



                                                  Therapy:           



                                                  Other (see           



                                                  Comments)           

 

     HYDROcodone      2022- No             1{tbl}      1 tablet,         

  [facility]



     -acetaminop      1-07 01-10                          Oral, Q6H,           i

ty of



     hen (NORCO      02:45: 21:58                          First dose           

[place]



     5) 5-325 mg      00   :01                           on Thu           Medica

l



     tablet 1                                         22 at           [place]



     tablet                                         ,           



                                                  Until           



                                                  Discontinu           



                                                  ed,            



                                                  Routine           

 

     morpHINE      2022- No             5mg       5 mg, Slow           Un

neeraj



     injection 5                                IV Push,           ity

 of



     mg        02:32: 21:31                          Q3HPRN,           [place]



               20   :17                           Starting           Medical



                                                  on u           [facility]



                                                  22 at           



                                                  203,           



                                                  Until 22 at           



                                                  1531,           



                                                  Routine,           



                                                  Pain           



                                                  (scale           



                                                  4-6), Pain           



                                                  (scale           



                                                  7-10), can           



                                                  give w           



                                                  norco if           



                                                  neccesary           



                                                  for            



                                                  breakthrou           



                                                  gh pain           

 

     ibuprofen            Yes            600mg      600 mg,           Univ

ers



     (IBU)                                     Oral,           ity of



     tablet 600      02:30:                               Q8HPRN,           [place]

[place]



     mg        45                                 Starting           Medical



                                                  on u           [facility]



                                                  22 at           



                                                  2030,           



                                                  Until           



                                                  Discontinu           



                                                  ed,            



                                                  Routine,           



                                                  Temp >           



                                                  38.5 C           

 

     lidocaine      2022- No             10mL      10 mL,           Unive

rs



     PF 2%                                Subcutaneo           ity of



     (XYLOCAINE-      01:15: 01:15                          us, ONCE           T

[facility]



     MPF)      00   :00                           NOW, 1           Medical



     injection                                         dose, On           [facility]



     10 mL                                         u 22           



                                                  at 1915,           



                                                  Routine           

 

     morpHINE      2022- No             4mg       4 mg, Slow           Un

neeraj



     injection 4      07                          IV Push,           ity

 of



     mg        18:47: 02:31                          Q4HPRN,           [place]



               23   :29                           Starting           Medical



                                                  on u           [facility]



                                                  22 at           



                                                  1247,           



                                                  Until Thu           



                                                  22 at           



                                                  2031,           



                                                  Routine,           



                                                  Pain           



                                                  (scale           



                                                  7-10)           

 

     acetaminoph      2022- No             650mg      650 mg,           U

nivers



     en        1-06 01-10                          Oral,           ity of



     (TYLENOL)      18:47: 22:01                          Q8HPRN,           [place]

[place]



     tablet 650      00   :15                           Starting           Medic

al



     mg                                           on u           [facility]



                                                  22 at           



                                                  1247,           



                                                  Until Mon           



                                                  1/10/22 at           



                                                  1601,           



                                                  Routine,           



                                                  Pain           



                                                  (scale           



                                                  1-3), Temp           



                                                  > 38.5 C           

 

     morpHINE      2022- No             2mg       2 mg, Slow           Un

neeraj



     injection 2                                IV Push,           ity

 of



     mg        14:53: 18:47                          Q4HPRN, [address] [place] [address]



               08   :58                           doses,           Medical



                                                  Starting           [facility]



                                                  on u           



                                                  22 at           



                                                  0853,           



                                                  Until Thu           



                                                  22 at           



                                                  1247,           



                                                  Routine,           



                                                  Pain           



                                                  (scale           



                                                  7-10)           

 

     heparin      2022- No             5000U      5,000           [facility]



     (porcine)      1-06 01-10                          Units,           ity of



     injection      14:00: 21:59                          Subcutaneo           T

[facility]



     5,000 Units      00   :38                           us, Q12H,           Med

ical



                                                  First dose           [facility]



                                                  on u           



                                                  22 at           



                                                  0800,           



                                                  Until           



                                                  Discontinu           



                                                  ed,            



                                                  Routine           

 

     ibuprofen      2022- No             400mg      400 mg,           Uni

vers



     (IBU)                                Oral,           ity of



     tablet 400      10:14: 18:47                          Q6HPRN,           [facility]

as



     mg        06   :58                           Starting           Medical



                                                  on u           Branch



                                                  22 at           



                                                  0414,           



                                                  Until Thu           



                                                  22 at           



                                                  1247,           



                                                  Routine,           



                                                  Pain           



                                                  (scale           



                                                  1-3), Temp           



                                                  > 38.5 C           

 

     ampicillin-      2022- No             3g        3 g, IV           Un

neeraj



     sulbactam                                Piggyback,           ity

 of



     (UNASYN) 3      09:30: 04:58                          Q6H ABX,           [facility]

[facility]



     g in NaCl      00   :28                           First dose           Medi

pedro



     0.9% (NS)                                         on Thu           [facility]anch



     100 mL                                         22 at           



     MINI-BAG                                         0330,           



                                                  Until           



                                                  Discontinu           



                                                  ed,            



                                                  Administer           



                                                  over 30           



                                                  Minutes,           



                                                  100            



                                                  mL<br>Reas           



                                                  on for           



                                                  Anti-Infec           



                                                  tive:           



                                                  Empiric           



                                                  Therapy           



                                                  for            



                                                  Suspected           



                                                  Infection<           



                                                  br>Empiric           



                                                  Therapy           



                                                  Site:           



                                                  HEENT<br>D           



                                                  uration of           



                                                  therapy: 7           



                                                  days           

 

     traMADoL       No             50mg      50 mg,           Univer

s



     (ULTRAM)                                Oral,           ity of



     tablet 50      08:25: 02:31                          [id number],           [place]

s



     mg        50   :29                           Starting           Medical



                                                  on Thu           Branch



                                                  22 at           



                                                  0225,           



                                                  Until Thu           



                                                  22 at           



                                                  2031,           



                                                  Routine,           



                                                  Pain           



                                                  (scale           



                                                  4-6)           

 

     ibuprofen      2022- No             600mg      600 mg,           Uni

vers



     (IBU)                                Oral,           ity of



     tablet 600      04:00: 02:53                          ONCE, 1           Mahesh

as



     mg        00   :00                           dose, On           Medical



                                                  Wed 22           Branch



                                                  at 2200,           



                                                  ASAP           

 

     vancomycin      2022- No             1000mg      1,000 mg,          

 Univers



     (VANCOCIN)                                IV             ity of



     1,000 mg in      03:00: 03:53                          [place]



     NaCl 0.9%      00   :00                           ONCE, 1           Medical



     (NS) 250 mL                                         dose, On           [facility]



     VIAL-MATE                                         Wed 22           



     IV                                           at 2100,           



     piggyback                                         Administer           



                                                  over 60           



                                                  Minutes,           



                                                  250            



                                                  mL<br>Reas           



                                                  on for           



                                                  Anti-Infec           



                                                  tive:           



                                                  Empiric           



                                                  Therapy           



                                                  for            



                                                  Suspected           



                                                  Infection<           



                                                  br>Empiric           



                                                  Therapy           



                                                  Site:           



                                                  Blood<br>D           



                                                  uration of           



                                                  therapy:           



                                                  72 hours           

 

     cefTRIAXone       No             1000mg      1,000 mg,         

  Univers



     (ROCEPHIN)                                IV             ity of



     1,000 mg in      03:00: 03:11                          Piggyback,          

 [place]



     NaCl 0.9%      00   :00                           ONCE, 1           Medical



     (NS) 50 mL                                         dose, On           [facility]

h



     MINI-BAG                                         Wed 22           



                                                  at 2100,           



                                                  Administer           



                                                  over 30           



                                                  Minutes,           



                                                  50             



                                                  mL<br&gt;R           



                                                  gaurav for           



                                                  Anti-Infec           



                                                  tive:           



                                                  Empiric           



                                                  Therapy           



                                                  for            



                                                  Suspected           



                                                  Infection<           



                                                  br>Empiric           



                                                  Therapy           



                                                  Site:           



                                                  Blood<br>D           



                                                  uration of           



                                                  therapy:           



                                                  72 hours           

 

     iopamidol      2022- No        [id number] 100mL      100 mL,         

  Univers



     (ISOVUE                                Intravenou           ity o

f



     370-500 mL)      02:17: 02:18                          s, ONCE, 1          

 [place]



     injection      00   :00                           dose, On           Medica

l



     100 mL                                         22           Branch



                                                  at 2030,           



                                                  Routine           

 

     morpHINE      2022- No             4mg       4 mg, Slow           Un

neeraj



     injection 4                                IV Push,           ity

 of



     mg        02:15: 01:38                          ONCE, 1           [place]



               00   :00                           dose, On           Medical



                                                  22           Branch



                                                  at 2015,           



                                                  STAT           

 

     NaCl 0.9%      2022- No             1000mL      at 999           Uni

vers



     (NS) IV                                mL/hr, IV           ity of



     infusion      01:15: 01:46                          Infusion,           Mahesh

as



     1,000 mL      00   :00                           ONCE, 1           Medical



                                                  dose, On           Branch



                                                  22           



                                                  at 1915,           



                                                  Routine           

 

     NaCl 0.9%      2022- No             1000mL      at 999           Uni

vers



     (NS) bolus      06                          mL/hr,           ity of



     infusion      23:45: 00:14                          1,000 mL,           [facility]

as



     1,000 mL      00   :00                           IV             Medical



                                                  Infusion,           Branch



                                                  ONCE, 1           



                                                  dose, On           



                                                  22           



                                                  at 1745,           



                                                  ASAP           

 

     ketorolac      2022- No             30mg      30 mg,           Unive

rs



     (TORADOL)                                Slow IV           ity of



     injection      23:26: 23:27                          Push,           [place]



     30 mg      00   :00                           ONCE, 1           Medical



                                                  dose, On           Branch



                                                  22           



                                                  at 1730,           



                                                  ASAP           

 

     casirivimab      2021- No        [id number] 1200mg      1,200 mg,    

       Univers



     -imdevimab                                Subcutaneo           it

y of



     (REGEN-COV      23:45: 22:33                          us, ONCE,           T

exas



     (EUA))      00   :00                           1 dose, On           Medical



     injection                                         Thu            Branch



     (CO-FORMULA                                         21           



     TION) 1,200                                         at 1745,           



     mg                                           Routine           







Vital Signs







             Vital Name   Observation Time Observation Value Comments     Source

 

             Systolic blood 2022 18:00:00 113 mm[Hg]                Univer

sity of



             pressure                                            [facility]

 

             Diastolic blood 2022 18:00:00 69 mm[Hg]                 Unive

rsity of



             pressure                                            [facility]

 

             Heart rate   2022 18:00:00 83 /min                   Universi

ty of



                                                                 [facility]

 

             Body temperature 2022 18:00:00 35.83 Roxana                 Univ

ersity of



                                                                 [facility]

 

             Oxygen saturation in 2022 18:00:00 99 /min                   

University of



             Arterial blood by                                        [place] Medi

pedro



             Pulse oximetry                                        Branch

 

             Respiratory rate 2022 09:55:00 16 /min                   Univ

ersity of



                                                                 [facility]

 

             Body height  2022 07:45:00 170.2 cm                  Universi

ty of



                                                                 [facility]

 

             Body weight  2022 07:45:00 65.318 kg                 Universi

ty of



                                                                 [facility]

 

             BMI          2022 07:45:00 22.55 kg/m2               Universi

ty of



                                                                 [facility]

 

             Systolic blood 2021 23:18:00 131 mm[Hg]                Univer

sity of



             pressure                                            [facility]

 

             Diastolic blood 2021 23:18:00 77 mm[Hg]                 Unive

rsity of



             pressure                                            [facility]

 

             Heart rate   2021 23:18:00 89 /min                   Universi

ty of



                                                                 [facility]

 

             Body temperature 2021 23:18:00 37.22 Roxana                 Univ

ersity of



                                                                 [facility]

 

             Respiratory rate 2021 23:18:00 20 /min                   Univ

ersity of



                                                                 [facility]

 

             Oxygen saturation in 2021 23:18:00 99 /min                   

University of



             Arterial blood by                                        [place] Medi

pedro



             Pulse oximetry                                        Branch

 

             Body height  2021 22:30:00 170.2 cm                  Universi

ty of



                                                                 [facility]

 

             Body weight  2021 22:30:00 61.689 kg                 Universi

ty of



                                                                 [facility]

 

             BMI          2021 22:30:00 21.30 kg/m2               Universi

ty of



                                                                 [facility]







Procedures







                Procedure       Date / Time     Performing Clinician Source



                                Performed                       

 

                XR KUB          2022 08:53:00 Mya Domingo o

[facility]

 

                GC & CHLAMYDIA AMPLIFIED 2022 16:21:00 Mya Domingo

[facility]

 

                GC & CHLAMYDIA AMPLIFIED 2022 16:19:00 DomingoMya  Uni

[facility] [place]



                ASSAY                                           [facility]

 

                COMP. METABOLIC PANEL 2022 12:36:00 Sanamniraj Kyung  [facility]



                ([id number])                                         [facility] [place]

 

                GC & CHLAMYDIA AMPLIFIED 2022 07:24:00 DomingoEldonah  [facility]

 

                HSV 1&2, VZV NAAT 2022 07:24:00 Mya Domingo  [facility]

 

                US ABDOMEN LIMITED 2022 15:45:14 [facility]

 

                COMP. METABOLIC PANEL 2022 11:36:00 [facility]



                ([id number])                         [facility]

 

                CBC WITH DIFF   2022 11:36:00 [facility]

 

                HEPATITIS B VIRUS (HBV) 2022 11:36:00 [facility]



                BY QUANTITATIVE NAAT                 [facility]

 

                XR ABDOMEN 2 VW 2022 01:05:00 [facility]

 

                HB ECG ROUTINE & RHYTHM 2022 23:18:53 [facility]



                STRIP                           [facility]

 

                NM HEPATOBILIARY W 2022 20:00:00 [facility]



                INTERVENTION                    [facility]

 

                FERRITIN SERUM  2022 11:07:00 [facility]

 

                IRON            2022 11:07:00 [facility]

 

                BILI UNCONJUGATED/BILI 2022 11:07:00 [facility]



                CONJUG                          [facility]

 

                COMP. METABOLIC PANEL 2022 11:07:00 [facility]



                ([id number])                         [facility]

 

                CBC WITH DIFF   2022 11:07:00 Brea   [place]

f [facility]

 

                CEREBROSPINAL FLUID 2022 03:31:00 Gurjit Lux [facility]



                PROTEIN                                         [facility]

 

                CEREBROSPINAL FLUID 2022 03:31:00 Gurjit Lux [facility]



                GLUCOSE                                         [facility]

 

                BODY FLUID DIRECT COUNT 2022 03:31:00 Gurjit Lux [facility]

 

                CSF/ SHUNT CULTURE 2022 03:31:00 [facility]

 

                FUNGUS (ROUTINE) CULTURE 2022 03:31:00 [facility]

 

                CSF CULTURE     2022 03:31:00 Gurjit Lux [facility]

 

                TRANSTHORACIC ECHO (TTE) 2022 21:37:48 [facility]

 

                CMV BY PCR      2022 15:25:00 Gurjit Lux [facility]

 

                GALV ONLY - SYPHILIS 2022 10:00:00 Gurjit Lux [facility]



                IGG/IGM                                         [facility]

 

                HEPATITIS B SURFACE 2022 10:00:00 Gurjit Lux [facility]



                ANTIBODY                                        [facility]

 

                RPR (QUANTITATIVE) 2022 10:00:00 Gurjit Lux [facility]

 

                MRSA / MSSA SCREEN BY 2022 09:25:00 Gurjit Lux [facility]



                PCR, NARES                                      [facility]

 

                THROAT CULTURE  2022 09:16:00 Gurjit Lux [facility]

 

                HAPTOGLOBIN, SERUM 2022 09:13:00 [facility]

 

                C-REACTIVE PROTEIN 2022 09:13:00 Gurjit Lux [facility]

 

                HEPATIC FUNCTION PANEL 2022 09:13:00 Gurjit Lux [facility]



                ([id number]) (ALB,T.PRO,BILI                                 [facility] 

Branch



                T,BU/BC,ALT,AST,ALK PHOS)                                 

 

                BASIC METABOLIC PANEL 2022 09:13:00 Gurjit Lux [facility]



                (NA, K, CL, CO2, GLUCOSE,                                 Medica

l Branch



                BUN, CREATININE, CA)                                 

 

                SEDIMENTATION RATE 2022 09:13:00 Gurjit Lux [facility]

 

                DIFF CONSULT    2022 09:13:00 [facility]

 

                CBC WITH DIFF   2022 09:13:00 [facility]

 

                D-DIMER         2022 09:13:00 Gurjit Lux [facility]

 

                HEPATITIS B SURFACE 2022 09:13:00 Gurjit Lux [facility]



                ANTIGEN                                         [facility]

 

                HCV ANTIBODY    2022 09:13:00 [facility]

 

                HEPATITIS A VIRUS 2022 09:13:00 Gurjit Lux [facility]



                ANTIBODY IGM                                    [facility]

 

                HEPATITIS B CORE ANTIBODY 2022 09:13:00 Gurjit Lux [facility]



                IGM                                             [facility]

 

                HEPATITIS C VIRUS (HCV) 2022 09:13:00 Gurjit Lux [facility]



                BY QUANTITATIVE NAAT                                 [facility]

 

                QUANTIFERON-TB ASSAY 2022 09:13:00 [facility]

 

                TYPHUS FEVER AB, IGM 2022 09:13:00 [facility]

 

                URINE DRUG (IMMUNOASSAY) 2022 09:00:00 Gurjit Lux [facility]



                - COMPREHENSIVE DRUG                                 [facility]



                SCREEN                                          

 

                URINE DRUG (LCMSMS) - 2022 09:00:00 Gurjit Lux [facility]



                SYNTHETIC OPIATES PANEL                                 Medical 

Branch

 

                CT ABDOMEN PELVIS W 2022 02:22:48 Abdiel Talavera [facility]



                CONTRAST                                        [facility]

 

                CT SOFT TISSUE NECK W 2022 02:22:48 Abdiel Talavera [facility]



                CONTRAST                                        [facility]

 

                CT CHEST PULMONARY 2022 02:22:48 Abdiel Talavera [facility]



                ANGIOGRAM                                       [facility]

 

                US GALL BLADDER 2022 01:24:40 Abdiel Talavera [facility]

 

                XR CHEST 1 VW   2022 00:32:07 Abdiel Talavera [facility]

 

                URINALYSIS      2022 23:30:00 Abdiel Talavera [facility]

 

                URINE CULTURE   2022 23:30:00 Abdiel Talavera [facility]

 

                HB ECG ROUTINE & RHYTHM 2022 23:28:50 Abdiel Talavera [facility]

 

                ASSIGNMENT OF BENEFITS 2022 23:13:59 Doctor Unassigned, Un

[facility]



                                                [place]         [facility]

 

                LACTIC ACID WHOLE BLOOD 2022 23:08:00 Abdiel Talavera [facility]

 

                BLOOD CULTURE SCREEN 2022 23:07:00 [facility]

[facility]

 

                CREATINE KINASE 2022 23:07:00 Gurjit Lux [facility]

 

                LIPASE          2022 23:07:00 Garry Anne [place] [facility]

f [facility]

 

                TROPONIN I      2022 23:07:00 Abdiel Talavera [facility]

 

                THYROID STIMULATING 2022 23:07:00 Gurjit Lux [facility]



                HORMONE                                         [facility]

 

                COMP. METABOLIC PANEL 2022 23:07:00 [facility]

[facility]



                ([id number])                                         [facility]

 

                ACETAMINOPHEN   2022 23:07:00 Gurjit Lux [facility]

 

                ETHANOL         2022 23:07:00 Gurjit Lux [facility]

 

                CBC WITH DIFF   2022 23:07:00 Abdiel Talavera [facility]

 

                EBV-MONONUCLEOSIS SCREEN 2022 23:07:00 Garry Anne [facility]

 

                N-TERMINAL PRO-BNP 2022 23:07:00 Gurjit Lux [facility]

 

                HIV 1/2 AG-AB WITH REFLEX 2022 23:07:00 Gurjit Lux

[facility]

 

                RAPID INFLUENZA A/B 2022 23:06:00 Abdiel Talavera [facility]

 

                COVID-19 (ID NOW RAPID 2022 23:06:00 Abdiel Talavera [facility]

[facility] [place]



                TESTING)                                        [facility] [place]

 

                LAB ONLY COVID  2022 23:06:00 Abdiel Talavera [facility]



                INTERPRETATION                                  [facility]

 

                CONSENT/REFUSAL FOR 2022 22:25:40 Doctor Unassigned, [facility]



                DIAGNOSIS AND TREATMENT                 [place]         [facility]

 

                NOTICE OF PRIVACY 2022 22:24:32 Doctor Unassigned, [facility]



                PRACTICES                       [place]         [facility]

 

                IMMTRAC2 CONSENT 2021 06:01:00 Doctor Unassigned, [facility]



                                                [place]         [facility]







Encounters







        Start   End     Encounter Admission Attending Care    Care    Encounter 

Source



        Date/Time Date/Time Type    Type    Clinicians Facility Department ID   

   

 

        2022 Transition         ANUEL Palomares  [id number]

[id number] [facility]



        00:00:00 00:00:00 of Care         Criss APPIAH   [id number]        

 [facility]   [id number]         [place]

s



                                                        [phone]         Medi

pedro



                                                        [address]             Branch

 

        2022 Inpatient X       OLEGARIO RAMIREZ [facility]    MEGAN     [id number]

[id number] [facility]



        16:45:00 16:48:00                                                 [facility]

 

        2022 [facility]         Abdiel Talavera  1.2.840.

[id number] 

Univers



        16:45:00 16:48:00 Encounter         Aakash Hoffman   350.1.1

3.10         [facility] 4.2.7.2.686  

       Chad Valera         [phone]  

       Medical



                                                        096             Branch

 

        2021 Nurse           Therapy, [facility]fusion [facility]  

  1.2.840.114 

[id number]                                Univers



        16:00:00 17:00:00 Visit           Armand Pierce 350.1.13.10   

      ity of



                                                [place] 4.2.7.2.686         [place]

s



                                                SURGICAL [phone]         Med

ical



                                                CENTER  [address]             Branch

 

        2021 Outpatient R       STAN  [facility]    [id number]

[id number] Univers



        16:00:00 16:00:00                 ARMAND                           ity of



                                                                        [facility]

 

        2021 Orders          Doctor  CATARINO    [id number]

21 Univers



        00:00:00 00:00:00 Only            Unassigned, GRANT   350.1.13.10       

  ity of



                                        [facility] [id number]         Mahesh

as



                                                        [phone]         [address]







Results







           Test Description Test Time  Test Comments Results    Result Comments 

Source









                    Blood Culture - Peripheral # 1 2022 00:01:47 









                      Test Item  Value      Reference Range Interpretation Comme

nts









             Blood Culture-Aerobic (test No organisms isolated No growth        

         Previous 

preliminary



             code = 17928-3)                                        verified res

ult was



                                                                 Culture In Prog

ress on



                                                                 2022 at 210

1



                                                                 CSTPrevious pre

liminary



                                                                 verified result

 was No



                                                                 growth at 24 ho

urs on



                                                                 2022 at 180

1



                                                                 CSTPrevious pre

liminary



                                                                 verified result

 was No



                                                                 growth at 48 ho

urs on



                                                                 2022 at 180

1



                                                                 CSTPrevious pre

liminary



                                                                 verified result

 was No



                                                                 growth at 72 ho

urs on



                                                                 2022 at 180

1 CST

 

             Blood Culture-Anaerobic No organisms isolated No growth            

     Previous preliminary



             (test code = 35138-4)                                        verifi

ed result was



                                                                 Culture In Prog

ress on



                                                                 2022 at 210

1



                                                                 CSTPrevious pre

liminary



                                                                 verified result

 was No



                                                                 growth at 24 ho

urs on



                                                                 2022 at 180

1



                                                                 CSTPrevious pre

liminary



                                                                 verified result

 was No



                                                                 growth at 48 ho

urs on



                                                                 2022 at 180

1



                                                                 CSTPrevious pre

liminary



                                                                 verified result

 was No



                                                                 growth at 72 ho

urs on



                                                                 2022 at 180

1 CST

 

             Lab Interpretation (test Normal                                 



             code = 87164-7)                                        



[facility] [id number] 
00:01:47





             Test Item    Value        Reference Range Interpretation Comments

 

             Blood Culture-Aerobic No organisms No growth                 Previo

us



             (test code = 17928-3) isolated                               prelim

inary



                                                                 verified result



                                                                 was Culture In



                                                                 Progress on



                                                                 2022 at 210

1



                                                                 CSTPrevious



                                                                 preliminary



                                                                 verified result



                                                                 was No growth a

t



                                                                 24 hours on



                                                                 2022 at 180

1



                                                                 CSTPrevious



                                                                 preliminary



                                                                 verified result



                                                                 was No growth a

t



                                                                 48 hours on



                                                                 2022 at 180

1



                                                                 CSTPrevious



                                                                 preliminary



                                                                 verified result



                                                                 was No growth a

t



                                                                 72 hours on



                                                                 2022 at 180

1



                                                                 CST

 

             Blood        No organisms No growth                 Previous



             Culture-Anaerobic isolated                               preliminar

y



             (test code = 43503-2)                                        verifi

ed result



                                                                 was Culture In



                                                                 Progress on



                                                                 2022 at 210

1



                                                                 CSTPrevious



                                                                 preliminary



                                                                 verified result



                                                                 was No growth a

t



                                                                 24 hours on



                                                                 2022 at 180

1



                                                                 CSTPrevious



                                                                 preliminary



                                                                 verified result



                                                                 was No growth a

t



                                                                 48 hours on



                                                                 2022 at 180

1



                                                                 CSTPrevious



                                                                 preliminary



                                                                 verified result



                                                                 was No growth a

t



                                                                 72 hours on



                                                                 2022 at 180

1



                                                                 CST

 

             Lab Interpretation Normal                                 



             (test code = 76114-1)                                        



[facility]ATIVE NAAT
2022 21:17:20





             Test Item    Value        Reference Range Interpretation Comments

 

             HCV Quantitative NAAT <1.00        Not Detected log              



             - log IU/mL (test code              IU/mL                     



             = 30421-5)                                          

 

             HCV Quantitative NAAT <10          Not Detected              



             - IU/mL (test code =              IU/mL                     



             92054-0)                                            

 

             HCV Quantitative Detected, not Not Detected A            



             Interpretation (test Quantifiable                           



             code = 2457658859)                                        

 

             SKYLER (test code = SKYLER) The Aptima HCV Quant                         

  



                          Dx assay is an                           



                          FDA-approved real-time                           



                          transcription-mediated                           



                          amplification (TMA)                           



                          test used for both                           



                          detection and                           



                          quantitation of                           



                          hepatitis C virus                           



                          (HCV) RNA in human                           



                          serum and plasma from                           



                          HCV-infected                           



                          individuals. ?It is                           



                          intended for use as an                           



                          aid in the diagnosis                           



                          of active HCV                           



                          infection and the                           



                          management of                           



                          HCV-infected patients                           



                          undergoing HCV                           



                          antiviral drug                           



                          therapy. ?It is not                           



                          approved for use as a                           



                          screening test for the                           



                          presence of HCV RNA in                           



                          blood or blood                           



                          products. The                           



                          quantitative range of                           



                          this assay is 1.00 -                           



                          8.00 log IU/mL or 10 -                           



                          100,000,000 IU/mL. An                           



                          interpretation of "Not                           



                          Detected" does not                           



                          rule out the presence                           



                          of inhibitors in the                           



                          patient specimen or                           



                          HCV RNA concentration                           



                          below the level of                           



                          detection of the test.                           



                          ?Care should be taken                           



                          when interpreting any                           



                          single viral load                           



                          determination.                           



                          Detected, not                           



                          Quantifiable: HCV RNA                           



                          detected, but at a                           



                          level below 10 IU/mL                           



                          (1.0 log IU/mL). ?HCV                           



                          RNA concentration is                           



                          below the lower limit                           



                          of quantitation of the                           



                          assay. Indeterminate:                           



                          Error indicated in the                           



                          generation of the                           



                          result. ?Please submit                           



                          a new specimen for                           



                          repeat testing if                           



                          clinically indicated.                           

 

             Lab Interpretation Abnormal                               



             (test code = 60879-9)                                        



[facility] [id number] 19:50:52





             Test Item    Value        Reference Range Interpretation Comments

 

             Nil (test code =              IU/mL                     



             25339-3)                                            

 

             TB1 minus Nil (test              IU/mL                     



             code = 60854-5)                                        

 

             TB2 minus Nil (test              IU/mL                     



             code = 8075959410)                                        

 

             Mitogen minus Nil              IU/mL                     



             (test code =                                        



             82577-5)                                            

 

             QFT Gold Plus Negative     Negative                  



             Result (test code =                                        



             84090-2)                                            

 

             SKYLER (test code = The QuantiFERON? TB Gold                          

 



             SKYLER)         Plus (in Tube) assay is                           



                          intended for use as an aid                           



                          in diagnosis of TB                           



                          infection. A qualitative                           



                          result (i.e., Negative,                           



                          Positive, or                           



                          Indeterminate) is based on                           



                          interpretation of the four                           



                          values, Nil, TB1 minus                           



                          Nil, TB2 minus Nil, and                           



                          Mitogen minus Nil. ?The                           



                          Nil value represents                           



                          nonspecific reactivity                           



                          produced by the patient                           



                          specimen. ?The TB1 minus                           



                          Nil value indicates the                           



                          interferon-gamma response                           



                          of CD4+ T lymphocytes,                           



                          specifically stimulated by                           



                          the TB1 antigens. ?The TB2                           



                          minus Nil value indicates                           



                          interferon-gamma response                           



                          of both CD4+ and CD8+ T                           



                          lymphocytes, stimulated by                           



                          TB2 antigens. ?The Mitogen                           



                          minus Nil value serves as                           



                          the positive control,                           



                          demonstrating the                           



                          successful responsiveness                           



                          of the T lymphocytes in                           



                          patient specimen. A                           



                          negative result suggests                           



                          that M. tuberculosis                           



                          infection is unlikely.                           



                          ?However, in patients with                           



                          high suspicion of                           



                          exposure, a negative test                           



                          should be repeated on a                           



                          new sample. A positive                           



                          result indicates an                           



                          interferon-gamma response                           



                          to M. tuberculosis                           



                          antigens, suggesting                           



                          infection with M.                           



                          tuberculosis. Positive                           



                          results in patients at                           



                          low-risk for tuberculosis                           



                          should be interpreted with                           



                          caution and repeat testing                           



                          on a new sample is                           



                          advised. ?If repeat                           



                          testing is positive,                           



                          treatment may be                           



                          indicated. ?Consult                           



                          Infectious Disease                           



                          Services for further                           



                          recommendations. False                           



                          positive results may occur                           



                          in patients with prior                           



                          infection with M. marinum,                           



                          M. szulgai, or M.                           



                          k[place]ii. For an                           



                          indeterminate result, the                           



                          likelihood of determining                           



                          infection with M.                           



                          tuberculosis cannot be                           



                          determined. ?If clinically                           



                          indicated, repeat testing                           



                          on a new sample is                           



                          advised. For further                           



                          information, refer to                           



                          http://www.cdc.gov/mmwr/pd                           



                          f/rr/cg5587.pdf and                           



                          https://doi.org/10.1093/ci                           



                          d/[email or web address].                              



[facility] (49486)2022 
13:01:28





             Test Item    Value        Reference Range Interpretation Comments

 

             NA (test code = 135 mmol/L   135-145                   



             5220353504)                                         

 

             K (test code = 4.4 mmol/L   3.5-5.0                   



             0701605665)                                         

 

             CL (test code = 102 mmol/L                       



             0958300252)                                         

 

             CO2 TOTAL (test code = 29 mmol/L    23-31                     



             0912796964)                                         

 

             AGAP (test code =              2-16                      



             3687851201)                                         

 

             BUN (test code = 10 mg/dL     7-23                      



             4417188187)                                         

 

             GLUCOSE (test code = 86 mg/dL                         



             5789068435)                                         

 

             CREATININE (test code = 0.67 mg/dL   0.60-1.25                 



             0968984126)                                         

 

             TOTAL BILI (test code = 2.1 mg/dL    0.1-1.1      H            



             7357148652)                                         

 

             CALCIUM (test code = 8.2 mg/dL    8.6-10.6     L            



             2116770151)                                         

 

             T PROTEIN (test code = 7.4 g/dL     6.3-8.2                   



             7444787750)                                         

 

             ALBUMIN (test code = 3.2 g/dL     3.5-5.0      L            



             9786554466)                                         

 

             ALK PHOS (test code = 443 U/L             H            



             3924941612)                                         

 

             ALTv (test code = 120 U/L      5-50         H            



             1742-6)                                             

 

             AST(SGOT) (test code = 107 U/L      13-40        H            



             [id number])                                         

 

             eGFR (test code =              mL/min/1.73m2              



             4752883078)                                         

 

             SKYLER (test code = SKYLER) Association of                           



                          Glomerular Filtration                           



                          Rate (GFR) and Staging                           



                          of Kidney Disease*                           



                          +---------------------                           



                          --+-------------------                           



                          --+-------------------                           



                          ------+| GFR                           



                          (mL/min/1.73 m2) ?|                           



                          With Kidney Damage ?|                           



                          ?Without Kidney                           



                          Damage+---------------                           



                          --------+-------------                           



                          --------+-------------                           



                          ------------+| ?>90 ?                           



                          ? ? ? ? ? ? ? ?|                           



                          ?Stage one ? ? ? ? ?|                           



                          ? Normal ? ? ? ? ? ? ?                           



                          ?+--------------------                           



                          ---+------------------                           



                          ---+------------------                           



                          -------+| ?60-89 ? ? ?                           



                          ? ? ? ? ?| ?Stage two                           



                          ? ? ? ? ?| ? Decreased                           



                          GFR ? ? ? ?                            



                          +---------------------                           



                          --+-------------------                           



                          --+-------------------                           



                          ------+| ?30-59 ? ? ?                           



                          ? ? ? ? ?| ?Stage                           



                          three ? ? ? ?| ? Stage                           



                          three ? ? ? ? ?                           



                          +---------------------                           



                          --+-------------------                           



                          --+-------------------                           



                          ------+| ?15-29 ? ? ?                           



                          ? ? ? ? ?| ?Stage four                           



                          ? ? ? ? | ? Stage four                           



                          ? ? ? ? ?                              



                          ?+--------------------                           



                          ---+------------------                           



                          ---+------------------                           



                          -------+| ?<15 (or                           



                          dialysis) ? ?| ?Stage                           



                          five ? ? ? ? | ? Stage                           



                          five ? ? ? ? ?                           



                          ?+--------------------                           



                          ---+------------------                           



                          ---+------------------                           



                          -------+ *Each stage                           



                          assumes the associated                           



                          GFR level has been in                           



                          effect for at least                           



                          three months. ?Stages                           



                          1 to 5, with or                           



                          without kidney                           



                          disease, indicate                           



                          chronic kidney                           



                          disease. Notes:                           



                          Determination of                           



                          stages one and two                           



                          (with eGFR                             



                          >59mL/min/1.73 m2)                           



                          requires estimation of                           



                          kidney damage for at                           



                          least three months as                           



                          defined by structural                           



                          or functional                           



                          abnormalities of the                           



                          kidney, manifested by                           



                          either:Pathological                           



                          abnormalities or                           



                          Markers of kidney                           



                          damage (including                           



                          abnormalities in the                           



                          composition of the                           



                          blood or urine or                           



                          abnormalities in                           



                          imaging tests).                           

 

             Lab Interpretation Abnormal                               



             (test code = 43420-0)                                        



[facility] [id number] 04:58:33





             Test Item    Value        Reference Range Interpretation Comments

 

             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

ETIVE INFORMATION:



             (test code = 5325-6)                                        Typhus 

Fever Antibody, IgM



                                                                 ?Less than 1:64

 ......



                                                                 Negative-No sig

nificant



                                                                 level of ? ? ? 

? ? ? ? ? ? ?



                                                                 ? ?IgM antibody

 detected.



                                                                 ?1:64 or greate

r .....



                                                                 Positive-Presen

ce of IgM



                                                                 antibody ? ? ? 

? ? ? ? ? ? ?



                                                                 ? ?detected, wh

ich may



                                                                 indicate a ? ? 

? ? ? ? ? ? ?



                                                                 ? ? ?current or

 recent



                                                                 infection; ? ? 

? ? ? ? ? ? ?



                                                                 ? ? ?however, l

ow levels of



                                                                 IgM  ? ? ? ? ? 

? ? ? ? ? ?



                                                                 ?antibodies may

 occasionally



                                                                 persist ? ? ? ?

 ? ? ? ? ? ?



                                                                 ? ?for more minda

n 12 months



                                                                 ? ? ? ? ? ? ? ?

 ? ? ?



                                                                 ?post-infection

. Antibody



                                                                 reactivity to R

ickettsia



                                                                 typhi antigen s

hould be



                                                                 considered grou

p-reactive



                                                                 for the Typhus 

Fever group,



                                                                 which includes 

Rickettsia



                                                                 prowazekii.  Se

roconversion



                                                                 between acute a

nd



                                                                 convalescent se

ra is



                                                                 considered stro

ng evidence



                                                                 of recent infec

tion. The



                                                                 best evidence i

s a



                                                                 significant telma

nge (fourfold



                                                                 difference in t

iter) on two



                                                                 appropriately t

imed



                                                                 specimens, wher

e both tests



                                                                 are done in the

 same



                                                                 laboratory at t

he same time.



                                                                 Acute-phase spe

cimens are



                                                                 collected durin

g the first



                                                                 week of illness

 and



                                                                 convalescent-ph

ase samples



                                                                 are generally o

btained 2-4



                                                                 weeks after res

olution of



                                                                 illness. Ideall

y these



                                                                 samples should 

be tested



                                                                 simultaneously 

at the same



                                                                 facility. If th

e sample



                                                                 submitted was c

ollected



                                                                 during the actu

e-phase of



                                                                 illness, submit

 a marked



                                                                 convalescent sa

mple within



                                                                 25 days for rosendo

red



                                                                 testing.Perform

ed By: [employer] [place] [address]



                                                                 [id number]

 Director:



                                                                 Lurdes France MD



                                                                 [Automated mess

age] The



                                                                 system which ge

nerated this



                                                                 result transmit

[place] reference



                                                                 range: <1:64. T

he reference



                                                                 range was not u

sed to



                                                                 interpret this 

result as



                                                                 normal/abnormal

.



[facility] NAAT
2022 22:42:32





             Test Item    Value        Reference Range Interpretation Comments

 

             HBV Quantitative NAAT              Not Detected log              



             - log IU/mL (test code              IU/mL                     



             = [id number])                                        

 

             HBV Quantitative NAAT              Not Detected              



             IU/ml (test code =              IU/mL                     



             46406-2)                                            

 

             HBV Quantitative Detected     Not Detected A            



             Interpretation (test                                        



             code = 29610-3)                                        

 

             SKYLER (test code = SKYLER) The Aptima HBV Quant                         

  



                          assay is an                            



                          FDA-approved in vitro                           



                          nucleic acid                           



                          amplification test for                           



                          the quantitation of                           



                          hepatitis B virus                           



                          (HBV) DNA in human                           



                          plasma and serum. ?It                           



                          is intended for use as                           



                          an aid in the                           



                          management of patients                           



                          with chronic HBV                           



                          infections undergoing                           



                          HBV antiviral drug                           



                          therapy. ?It is not                           



                          approved for use as a                           



                          screening test for the                           



                          presence of HBV DNA in                           



                          blood or blood                           



                          products or as a                           



                          diagnostic test to                           



                          confirm the presence                           



                          of HBV infection. The                           



                          quantitative range of                           



                          this assay is 1.00 -                           



                          9.00 log IU/mL or 10 -                           



                          1,000,000,000 IU/mL.                           



                          An interpretation of                           



                          "Not Detected" does                           



                          not rule out the                           



                          presence of inhibitors                           



                          in the patient                           



                          specimen or HBV DNA                           



                          concentration below                           



                          the level of detection                           



                          of the test. ?Care                           



                          should be taken when                           



                          interpreting any                           



                          single viral load                           



                          determination.                           



                          Detected, not                           



                          Quantifiable: HBV DNA                           



                          detected, but at a                           



                          level below 10 IU/mL                           



                          (1.0 log IU/mL). ?HBV                           



                          DNA concentration is                           



                          below the lower limit                           



                          of quantitation of the                           



                          assay. Indeterminate:                           



                          Error indicated in the                           



                          generation of the                           



                          result. ?Please submit                           



                          a new specimen for                           



                          repeat testing if                           



                          clinically indicated.                           

 

             Lab Interpretation Abnormal                               



             (test code = 22736-6)                                        



[facility] METABOLIC PANEL ([id number])2022 
12:29:46





             Test Item    Value        Reference Range Interpretation Comments

 

             NA (test code = 131 mmol/L   135-145      L            



             [id number])                                         

 

             K (test code = 4.1 mmol/L   3.5-5.0                   



             0488452887)                                         

 

             CL (test code = 98 mmol/L                        



             1543471628)                                         

 

             CO2 TOTAL (test code = 27 mmol/L    23-31                     



             8515804687)                                         

 

             AGAP (test code =              2-16                      



             0966474710)                                         

 

             BUN (test code = 9 mg/dL      7-23                      



             0225071911)                                         

 

             GLUCOSE (test code = 90 mg/dL                         



             4598914553)                                         

 

             CREATININE (test code = 0.79 mg/dL   0.60-1.25                 



             4927414266)                                         

 

             TOTAL BILI (test code = 2.3 mg/dL    0.1-1.1      H            



             9796438688)                                         

 

             CALCIUM (test code = 8.1 mg/dL    8.6-10.6     L            



             3048107882)                                         

 

             T PROTEIN (test code = 7.7 g/dL     6.3-8.2                   



             9948053204)                                         

 

             ALBUMIN (test code = 3.3 g/dL     3.5-5.0      L            



             5445611785)                                         

 

             ALK PHOS (test code = 508 U/L             H            



             1682235550)                                         

 

             ALTv (test code = 138 U/L      5-50         H            



             1742-6)                                             

 

             AST(SGOT) (test code = 121 U/L      13-40        H            



             4592072983)                                         

 

             eGFR (test code =              mL/min/1.73m2              



             6334414232)                                         

 

             SKYLER (test code = SKYLER) Association of                           



                          Glomerular Filtration                           



                          Rate (GFR) and Staging                           



                          of Kidney Disease*                           



                          +---------------------                           



                          --+-------------------                           



                          --+-------------------                           



                          ------+| GFR                           



                          (mL/min/1.73 m2) ?|                           



                          With Kidney Damage ?|                           



                          ?Without Kidney                           



                          Damage+---------------                           



                          --------+-------------                           



                          --------+-------------                           



                          ------------+| ?>90 ?                           



                          ? ? ? ? ? ? ? ?|                           



                          ?Stage one ? ? ? ? ?|                           



                          ? Normal ? ? ? ? ? ? ?                           



                          ?+--------------------                           



                          ---+------------------                           



                          ---+------------------                           



                          -------+| ?60-89 ? ? ?                           



                          ? ? ? ? ?| ?Stage two                           



                          ? ? ? ? ?| ? Decreased                           



                          GFR ? ? ? ?                            



                          +---------------------                           



                          --+-------------------                           



                          --+-------------------                           



                          ------+| ?30-59 ? ? ?                           



                          ? ? ? ? ?| ?Stage                           



                          three ? ? ? ?| ? Stage                           



                          three ? ? ? ? ?                           



                          +---------------------                           



                          --+-------------------                           



                          --+-------------------                           



                          ------+| ?15-29 ? ? ?                           



                          ? ? ? ? ?| ?Stage four                           



                          ? ? ? ? | ? Stage four                           



                          ? ? ? ? ?                              



                          ?+--------------------                           



                          ---+------------------                           



                          ---+------------------                           



                          -------+| ?<15 (or                           



                          dialysis) ? ?| ?Stage                           



                          five ? ? ? ? | ? Stage                           



                          five ? ? ? ? ?                           



                          ?+--------------------                           



                          ---+------------------                           



                          ---+------------------                           



                          -------+ *Each stage                           



                          assumes the associated                           



                          GFR level has been in                           



                          effect for at least                           



                          three months. ?Stages                           



                          1 to 5, with or                           



                          without kidney                           



                          disease, indicate                           



                          chronic kidney                           



                          disease. Notes:                           



                          Determination of                           



                          stages one and two                           



                          (with eGFR                             



                          >59mL/min/1.73 m2)                           



                          requires estimation of                           



                          kidney damage for at                           



                          least three months as                           



                          defined by structural                           



                          or functional                           



                          abnormalities of the                           



                          kidney, manifested by                           



                          either:Pathological                           



                          abnormalities or                           



                          Markers of kidney                           



                          damage (including                           



                          abnormalities in the                           



                          composition of the                           



                          blood or urine or                           



                          abnormalities in                           



                          imaging tests).                           

 

             Lab Interpretation Abnormal                               



             (test code = 69643-5)                                        



[facility] WITH [id number] 12:04:01





             Test Item    Value        Reference Range Interpretation Comments

 

             WBC (test code =              See_Comment                [Automated



             8690-2)                                             message] The sy

stem



                                                                 which generated



                                                                 this result



                                                                 transmitted



                                                                 reference range

:



                                                                 4.20 - 10.70



                                                                 10*3/?L. The



                                                                 reference range

 was



                                                                 not used to



                                                                 interpret this



                                                                 result as



                                                                 normal/abnormal

.

 

             RBC (test code =              See_Comment  L             [Automated



             699-8)                                              message] The sy

stem



                                                                 which generated



                                                                 this result



                                                                 transmitted



                                                                 reference range

:



                                                                 4.26 - 5.52



                                                                 10*6/?L. The



                                                                 reference range

 was



                                                                 not used to



                                                                 interpret this



                                                                 result as



                                                                 normal/abnormal

.

 

             HGB (test code = 9.7 g/dL     12.2-16.4    L            



             718-7)                                              

 

             HCT (test code = 29.2 %       38.4-49.3    L            



             4544-3)                                             

 

             MCV (test code = 89.6 fL      81.7-95.6                 



             787-2)                                              

 

             MCH (test code = 29.8 pg      26.1-32.7                 



             785-6)                                              

 

             MCHC (test code = 33.2 g/dL    31.2-35.0                 



             786-4)                                              

 

             RDW-SD (test code = 55.9 fL      38.5-51.6    H            



             20645-8)                                            

 

             RDW-CV (test code = 17.0 %       12.1-15.4    H            



             788-0)                                              

 

             PLT (test code =              See_Comment  H             [Automated



             777-3)                                              message] The sy

stem



                                                                 which generated



                                                                 this result



                                                                 transmitted



                                                                 reference range

:



                                                                 150 - 328 10*3/

?L.



                                                                 The reference r

leslie



                                                                 was not used to



                                                                 interpret this



                                                                 result as



                                                                 normal/abnormal

.

 

             MPV (test code = 9.5 fL       9.8-13.0     L            



             67645-1)                                            

 

             NRBC/100 WBC (test              See_Comment                [Automat

ed



             code = 2687961131)                                        message] 

The system



                                                                 which generated



                                                                 this result



                                                                 transmitted



                                                                 reference range

:



                                                                 0.0 - 10.0 /100



                                                                 WBCs. The refer

ence



                                                                 range was not u

sed



                                                                 to interpret th

is



                                                                 result as



                                                                 normal/abnormal

.

 

             NRBC x10^3 (test code <0.01        See_Comment                [Auto

mated



             = [id number])                                        message] The s

ystem



                                                                 which generated



                                                                 this result



                                                                 transmitted



                                                                 reference range

:



                                                                 10*3/?L. The



                                                                 reference range

 was



                                                                 not used to



                                                                 interpret this



                                                                 result as



                                                                 normal/abnormal

.

 

             GRAN MAT (NEUT) % 77.6 %                                 



             (test code = 770-8)                                        

 

             IMM GRAN % (test code 0.90 %                                 



             = [id number])                                        

 

             LYMPH % (test code = 9.7 %                                  



             736-9)                                              

 

             MONO % (test code = 9.5 %                                  



             5905-5)                                             

 

             EOS % (test code = 1.8 %                                  



             713-8)                                              

 

             BASO % (test code = 0.5 %                                  



             706-2)                                              

 

             GRAN MAT x10^3(ANC) 5.99 10*3/uL 1.99-6.95                 



             (test code =                                        



             0315825695)                                         

 

             IMM GRAN x10^3 (test 0.07 10*3/uL 0.00-0.06    H            



             code = 3913824848)                                        

 

             LYMPH x10^3 (test code 0.75 10*3/uL 1.09-3.23    L            



             = 731-0)                                            

 

             MONO x10^3 (test code 0.73 10*3/uL 0.36-1.02                 



             = 742-7)                                            

 

             EOS x10^3 (test code = 0.14 10*3/uL 0.06-0.53                 



             711-2)                                              

 

             BASO x10^3 (test code 0.04 10*3/uL 0.01-0.09                 



             = 704-7)                                            

 

             Lab Interpretation Abnormal                               



             (test code = 98268-1)                                        



[id number]53





             Test Item    Value        Reference Range Interpretation Comments

 

             Specimen Tested Plasma                                 



             (test code =                                        



             7579618564)                                         

 

             CMV PCR - log <2.5         See_Comment                [Automated



             IU/mL (test                                         message] The



             code = 32232-8)                                        system which



                                                                 generated this



                                                                 result



                                                                 transmitted



                                                                 reference range

:



                                                                 <2.5 log IU/mL.



                                                                 The reference



                                                                 range was not



                                                                 used to interpr

et



                                                                 this result as



                                                                 normal/abnormal

.

 

             CMV PCR - IU/mL <300         See_Comment                [Automated



             (test code =                                        message] The



             77752-6)                                            system which



                                                                 generated this



                                                                 result



                                                                 transmitted



                                                                 reference range

:



                                                                 <300 IU/mL. The



                                                                 reference range



                                                                 was not used to



                                                                 interpret this



                                                                 result as



                                                                 normal/abnormal

.

 

             CMV PCR - log <2.7         See_Comment                [Automated



             copies/mL (test                                        message] The



             code = 54571-4)                                        system which



                                                                 generated this



                                                                 result



                                                                 transmitted



                                                                 reference range

:



                                                                 <2.7 log



                                                                 copies/mL. The



                                                                 reference range



                                                                 was not used to



                                                                 interpret this



                                                                 result as



                                                                 normal/abnormal

.

 

             CMV PCR -    <516         See_Comment                [Automated



             copies/mL (test                                        message] The



             code = 79579-0)                                        system which



                                                                 generated this



                                                                 result



                                                                 transmitted



                                                                 reference range

:



                                                                 <516 copies/mL.



                                                                 The reference



                                                                 range was not



                                                                 used to interpr

et



                                                                 this result as



                                                                 normal/abnormal

.

 

             SKYLER (test code Test Information:                           



             = SKYLER)       Cytomegalovirus (CMV)                           



                          DNA, Quantitation. The                           



                          quantitative range of                           



                          this assay is 2.5 -                           



                          6.5 log IU/mL (300 -                           



                          3,000,000 IU/mL) ?or                           



                          2.7 - 6.7 log                           



                          copies/mL (516 -                           



                          5,160,000 copies/mL).                           



                          ?One IU/mL of CMV DNA                           



                          is approximately 1.72                           



                          copies/mL.  ?A                           



                          negative result (less                           



                          than 2.5 log IU/mL or                           



                          less than 300 IU/mL)                           



                          does not rule out the                           



                          presence of PCR                           



                          inhibitors in the                           



                          patient specimen or                           



                          CMV DNA concentrations                           



                          below the level of                           



                          detection by the                           



                          assay. Inhibition may                           



                          also lead to                           



                          underestimation of                           



                          viral quantitation.                           



                          ?The limit of                           



                          quantification for                           



                          this DNA assay is 2.5                           



                          log IU/mL (300 IU/mL)                           



                          or 2.7 log copies/mL                           



                          (516 copies/mL). ?If                           



                          the assay DID NOT                           



                          DETECT the virus, the                           



                          test result will be                           



                          reported as "<2.5 log                           



                          IU/mL (<300 IU/mL)"                           



                          and "<2.7 log                           



                          copies/mL (<516                           



                          copies/mL)." ?If the                           



                          assay DETECTED the                           



                          presence of the virus                           



                          but was not able to                           



                          accurately quantify                           



                          the number of copies,                           



                          the test result will                           



                          be reported as                           



                          "Detected, not                           



                          quantifiable."                           



                          Indeterminate: ?Unable                           



                          to generate a valid                           



                          test result on this                           



                          specimen. ?Please                           



                          submit a new specimen                           



                          for repeat testing if                           



                          clinically indicated.                           



                          This is a                              



                          laboratory-developed                           



                          test using a                           



                           labeled                           



                          ASR (Analyte Specific                           



                          Reagent) as the                           



                          reagent providing the                           



                          specificity of the                           



                          assay. ?This test was                           



                          developed and its                           



                          performance                            



                          characteristics                           



                          determined by [facility]                           



                          Clinical Microbiology                           



                          Laboratory. It has not                           



                          been cleared or                           



                          approved by the [place]                           



                          Food and Drug                           



                          Administration;                           



                          however, the FDA has                           



                          determined that such                           



                          clearance or approval                           



                          is not necessary. This                           



                          test is used for                           



                          clinical purposes. It                           



                          should not be regarded                           



                          as investigational or                           



                          for research. This                           



                          laboratory is                           



                          certified under the                           



                          Clinical Laboratory                           



                          Improvement Amendments                           



                          of 1988 (CLIA-88) as                           



                          qualified to perform                           



                          high complexity                           



                          clinical laboratory                           



                          testing.                               



[id number] 19:59:34





             Test Item    Value        Reference Range Interpretation Comments

 

             HAPTOGLOB (test code = 7329703053) 234 mg/dL           H     

       

 

             Lab Interpretation (test code = Abnormal                           

    



             19814-0)                                            



[facility] [id number] 17:58:12





             Test Item    Value        Reference Range Interpretation Comments

 

             FERRITIN (test code = 135.0 ng/mL  18.0-464.0                



             [id number])                                         

 

             SKYLER (test code = SKYLER) Biotin has been                           



                          reported to cause a                           



                          negative bias,                           



                          interpret results                           



                          relative to                            



                          patient's use of                           



                          biotin.                                

 

             Lab Interpretation (test Normal                                 



             code = 49483-4)                                        



[facility]





             Test Item    Value        Reference Range Interpretation Comments

 

             IRON (test code = 4462479905) 50 ug/dL                       

  

 

             Lab Interpretation (test code = Normal                             

    



             35907-0)                                            



[facility]. METABOLIC PANEL (81167)2022 
11:54:31





             Test Item    Value        Reference Range Interpretation Comments

 

             NA (test code = 132 mmol/L   135-145      L            



             [id number])                                         

 

             K (test code = 3.6 mmol/L   3.5-5.0                   



             [id number])                                         

 

             CL (test code = 101 mmol/L                       



             1968656781)                                         

 

             CO2 TOTAL (test code = 24 mmol/L    23-31                     



             8525361184)                                         

 

             AGAP (test code =              2-16                      



             8944780896)                                         

 

             BUN (test code = 8 mg/dL      7-23                      



             9207841484)                                         

 

             GLUCOSE (test code = 115 mg/dL           H            



             3638861857)                                         

 

             CREATININE (test code = 0.75 mg/dL   0.60-1.25                 



             3520970180)                                         

 

             TOTAL BILI (test code = 1.8 mg/dL    0.1-1.1      H            



             9200729676)                                         

 

             CALCIUM (test code = 7.9 mg/dL    8.6-10.6     L            



             3205624806)                                         

 

             T PROTEIN (test code = 7.2 g/dL     6.3-8.2                   



             4299093916)                                         

 

             ALBUMIN (test code = 3.1 g/dL     3.5-5.0      L            



             4589942079)                                         

 

             ALK PHOS (test code = 471 U/L             H            



             0571208555)                                         

 

             ALTv (test code = 128 U/L      5-50         H            



             1742-6)                                             

 

             AST(SGOT) (test code = 104 U/L      13-40        H            



             731991)                                         

 

             eGFR (test code =              mL/min/1.73m2              



             2309962655)                                         

 

             SKYLER (test code = SKYLER) Association of                           



                          Glomerular Filtration                           



                          Rate (GFR) and Staging                           



                          of Kidney Disease*                           



                          +---------------------                           



                          --+-------------------                           



                          --+-------------------                           



                          ------+| GFR                           



                          (mL/min/1.73 m2) ?|                           



                          With Kidney Damage ?|                           



                          ?Without Kidney                           



                          Damage+---------------                           



                          --------+-------------                           



                          --------+-------------                           



                          ------------+| ?>90 ?                           



                          ? ? ? ? ? ? ? ?|                           



                          ?Stage one ? ? ? ? ?|                           



                          ? Normal ? ? ? ? ? ? ?                           



                          ?+--------------------                           



                          ---+------------------                           



                          ---+------------------                           



                          -------+| ?60-89 ? ? ?                           



                          ? ? ? ? ?| ?Stage two                           



                          ? ? ? ? ?| ? Decreased                           



                          GFR ? ? ? ?                            



                          +---------------------                           



                          --+-------------------                           



                          --+-------------------                           



                          ------+| ?30-59 ? ? ?                           



                          ? ? ? ? ?| ?Stage                           



                          three ? ? ? ?| ? Stage                           



                          three ? ? ? ? ?                           



                          +---------------------                           



                          --+-------------------                           



                          --+-------------------                           



                          ------+| ?15-29 ? ? ?                           



                          ? ? ? ? ?| ?Stage four                           



                          ? ? ? ? | ? Stage four                           



                          ? ? ? ? ?                              



                          ?+--------------------                           



                          ---+------------------                           



                          ---+------------------                           



                          -------+| ?<15 (or                           



                          dialysis) ? ?| ?Stage                           



                          five ? ? ? ? | ? Stage                           



                          five ? ? ? ? ?                           



                          ?+--------------------                           



                          ---+------------------                           



                          ---+------------------                           



                          -------+ *Each stage                           



                          assumes the associated                           



                          GFR level has been in                           



                          effect for at least                           



                          three months. ?Stages                           



                          1 to 5, with or                           



                          without kidney                           



                          disease, indicate                           



                          chronic kidney                           



                          disease. Notes:                           



                          Determination of                           



                          stages one and two                           



                          (with eGFR                             



                          >59mL/min/1.73 m2)                           



                          requires estimation of                           



                          kidney damage for at                           



                          least three months as                           



                          defined by structural                           



                          or functional                           



                          abnormalities of the                           



                          kidney, manifested by                           



                          either:Pathological                           



                          abnormalities or                           



                          Markers of kidney                           



                          damage (including                           



                          abnormalities in the                           



                          composition of the                           



                          blood or urine or                           



                          abnormalities in                           



                          imaging tests).                           

 

             Lab Interpretation Abnormal                               



             (test code = 18709-2)                                        



[facility] [id number] 
11:54:31





             Test Item    Value        Reference Range Interpretation Comments

 

             BILI CONJ (test code = 6828626376) 0.1 mg/dL    0.0-0.3            

       

 

             BILI UNCON (test code = 8602301146) 0.5 mg/dL    0.1-1.1           

        

 

             Lab Interpretation (test code = Normal                             

    



             29925-5)                                            



[facility] WITH [id number] 11:27:05





             Test Item    Value        Reference Range Interpretation Comments

 

             WBC (test code =              See_Comment                [Automated



             3090-2)                                             message] The sy

stem



                                                                 which generated



                                                                 this result



                                                                 transmitted



                                                                 reference range

:



                                                                 4.20 - 10.70



                                                                 10*3/?L. The



                                                                 reference range

 was



                                                                 not used to



                                                                 interpret this



                                                                 result as



                                                                 normal/abnormal

.

 

             RBC (test code =              See_Comment  L             [Automated



             599-8)                                              message] The sy

stem



                                                                 which generated



                                                                 this result



                                                                 transmitted



                                                                 reference range

:



                                                                 4.26 - 5.52



                                                                 10*6/?L. The



                                                                 reference range

 was



                                                                 not used to



                                                                 interpret this



                                                                 result as



                                                                 normal/abnormal

.

 

             HGB (test code = 9.6 g/dL     12.2-16.4    L            



             718-7)                                              

 

             HCT (test code = 29.3 %       38.4-49.3    L            



             4544-3)                                             

 

             MCV (test code = 89.1 fL      81.7-95.6                 



             787-2)                                              

 

             MCH (test code = 29.2 pg      26.1-32.7                 



             785-6)                                              

 

             MCHC (test code = 32.8 g/dL    31.2-35.0                 



             786-4)                                              

 

             RDW-SD (test code = 55.4 fL      38.5-51.6    H            



             15656-9)                                            

 

             RDW-CV (test code = 17.0 %       12.1-15.4    H            



             788-0)                                              

 

             PLT (test code =              See_Comment  H             [Automated



             777-3)                                              message] The sy

stem



                                                                 which generated



                                                                 this result



                                                                 transmitted



                                                                 reference range

:



                                                                 150 - 328 10*3/

?L.



                                                                 The reference r

leslie



                                                                 was not used to



                                                                 interpret this



                                                                 result as



                                                                 normal/abnormal

.

 

             MPV (test code = 9.3 fL       9.8-13.0     L            



             09672-7)                                            

 

             NRBC/100 WBC (test              See_Comment                [Automat

ed



             code = 5252502436)                                        message] 

The system



                                                                 which generated



                                                                 this result



                                                                 transmitted



                                                                 reference range

:



                                                                 0.0 - 10.0 /100



                                                                 WBCs. The refer

ence



                                                                 range was not u

sed



                                                                 to interpret th

is



                                                                 result as



                                                                 normal/abnormal

.

 

             NRBC x10^3 (test code <0.01        See_Comment                [Auto

mated



             = 0428616987)                                        message] The s

ystem



                                                                 which generated



                                                                 this result



                                                                 transmitted



                                                                 reference range

:



                                                                 10*3/?L. The



                                                                 reference range

 was



                                                                 not used to



                                                                 interpret this



                                                                 result as



                                                                 normal/abnormal

.

 

             GRAN MAT (NEUT) % 66.5 %                                 



             (test code = 770-8)                                        

 

             IMM GRAN % (test code 1.70 %                                 



             = 8227191585)                                        

 

             LYMPH % (test code = 14.9 %                                 



             736-9)                                              

 

             MONO % (test code = 14.3 %                                 



             5905-5)                                             

 

             EOS % (test code = 1.9 %                                  



             713-8)                                              

 

             BASO % (test code = 0.7 %                                  



             706-2)                                              

 

             GRAN MAT x10^3(ANC) 4.99 10*3/uL 1.99-6.95                 



             (test code =                                        



             1954500226)                                         

 

             IMM GRAN x10^3 (test 0.13 10*3/uL 0.00-0.06    H            



             code = 9403765258)                                        

 

             LYMPH x10^3 (test code 1.12 10*3/uL 1.09-3.23                 



             = 731-0)                                            

 

             MONO x10^3 (test code 1.07 10*3/uL 0.36-1.02    H            



             = 742-7)                                            

 

             EOS x10^3 (test code = 0.14 10*3/uL 0.06-0.53                 



             711-2)                                              

 

             BASO x10^3 (test code 0.05 10*3/uL 0.01-0.09                 



             = 704-7)                                            

 

             Lab Interpretation Abnormal                               



             (test code = 88820-9)                                        



[id number]30:26





             Test Item    Value        Reference Range Interpretation Comments

 

             CRP (test code = 5615672591) 8.4 mg/dL    <0.8         H           

 

 

             Lab Interpretation (test code = Abnormal                           

    



             39190-5)                                            



[facility] [id number] 20:05:03





             Test Item    Value        Reference Range Interpretation Comments

 

             T. pallidum Particle Reactive     Nonreactive  A            



             Agglutination (test code =                                        



             0610189925)                                         

 

             SKYLER (test code = SKYLER) Based on IgG/IgM,                           



                          RPR, and TPPA                           



                          results, probable                           



                          active T. pallidum                           



                          infection. ?                           

 

             Lab Interpretation (test Abnormal                               



             code = 85337-3)                                        



[facility] 19[facility]56[facility]12





             Test Item    Value        Reference Range Interpretation Comments

 

             RPR (Quantitative) (test code = 1:256        Nonreactive  A        

    



             58711-9)                                            

 

             Lab Interpretation (test code = Abnormal                           

    



             65851-9)                                            



[facility] [id number] 19:03:09
LEUKOCYTES ARE UNREMARKABLE.  FEW REACTIVE LYMPHOCYTES IDENTIFIED. MILD 
NORMOCYTIC NORMOCHROMIC ANEMIA. PLATELETS ARE UNREMARKABLE.[facility] - SYPHILIS IGG/[id number] 16:58:56





             Test Item    Value        Reference Range Interpretation Comments

 

             Syphilis IgG/IgM (test Reactive     Non-reactive A            



             code = 15802-7)                                        

 

             SKYLER (test code = SKYLER)  Non-reactive - No                           



                          serologic evidence                           



                          of T. pallidum                           



                          infection. Cannot                           



                          exclude incubating                           



                          or early syphilis.                           



                          Submit a second                           



                          specimen in 2-4                           



                          weeks if syphilis is                           



                          clinically                             



                          suspected. Equivocal                           



                          - Further testing to                           



                          follow. Reactive -                           



                          Further testing to                           



                          follow.                                

 

             Lab Interpretation (test Abnormal                               



             code = 59016-0)                                        



[facility] [id number] 15:42:39





             Test Item    Value        Reference Range Interpretation Comments

 

             HCV Ab (test code = Positive                               



             44935-4)                                            

 

             HCV                                                 



             Semi-Quantitative                                        



             (test code =                                        



             32531-3)                                            

 

              APRI (test code =                                        



             5142405992)                                         

 

             SKYLER (test code = Positive for HCV antibody                         

  



             SKYLER)         with a high signal to                           



                          cutoff ratio (s/c).                           



                          ?Supplementary test for                           



                          Hepatitis C Virus RNA                           



                          Real-Time PCR is                           



                          recommended if clinically                           



                          indicated. ?If any                           



                          questions, please contact                           



                          Clinical Chemistry                           



                          Director on call at                           



                          [phone].APRI score <                           



                          0.5: Suggestive of little                           



                          to no fibrosisAPRI score >                           



                          1.5: Suggestive of                           



                          moderate to severe                           



                          fibrosisAPRI score > 2.0:                           



                          Highly suggestive of                           



                          cirrhosis.                             



[facility] WITH [id number] 14:43:40





             Test Item    Value        Reference Range Interpretation Comments

 

             WBC (test code =              See_Comment                [Automated



             6690-2)                                             message] The sy

stem



                                                                 which generated



                                                                 this result



                                                                 transmitted



                                                                 reference range

:



                                                                 4.20 - 10.70



                                                                 10*3/?L. The



                                                                 reference range

 was



                                                                 not used to



                                                                 interpret this



                                                                 result as



                                                                 normal/abnormal

.

 

             RBC (test code =              See_Comment  L             [Automated



             789-8)                                              message] The sy

stem



                                                                 which generated



                                                                 this result



                                                                 transmitted



                                                                 reference range

:



                                                                 4.26 - 5.52



                                                                 10*6/?L. The



                                                                 reference range

 was



                                                                 not used to



                                                                 interpret this



                                                                 result as



                                                                 normal/abnormal

.

 

             HGB (test code = 11.8 g/dL    12.2-16.4    L            



             718-7)                                              

 

             HCT (test code = 34.7 %       38.4-49.3    L            



             4544-3)                                             

 

             MCV (test code = 89.0 fL      81.7-95.6                 



             787-2)                                              

 

             MCH (test code = 30.3 pg      26.1-32.7                 



             785-6)                                              

 

             MCHC (test code = 34.0 g/dL    31.2-35.0                 



             786-4)                                              

 

             RDW-SD (test code = 57.9 fL      38.5-51.6    H            



             25861-9)                                            

 

             RDW-CV (test code = 18.0 %       12.1-15.4    H            



             788-0)                                              

 

             PLT (test code =              See_Comment                [Automated



             777-3)                                              message] The sy

stem



                                                                 which generated



                                                                 this result



                                                                 transmitted



                                                                 reference range

:



                                                                 150 - 328 10*3/

?L.



                                                                 The reference r

leslie



                                                                 was not used to



                                                                 interpret this



                                                                 result as



                                                                 normal/abnormal

.

 

             MPV (test code = 10.8 fL      9.8-13.0                  



             25348-5)                                            

 

             NRBC/100 WBC (test              See_Comment                [Automat

ed



             code = 5599388805)                                        message] 

The system



                                                                 which generated



                                                                 this result



                                                                 transmitted



                                                                 reference range

:



                                                                 0.0 - 10.0 /100



                                                                 WBCs. The refer

ence



                                                                 range was not u

sed



                                                                 to interpret th

is



                                                                 result as



                                                                 normal/abnormal

.

 

             NRBC x10^3 (test code <0.01        See_Comment                [Auto

mated



             = 5616315334)                                        message] The s

ystem



                                                                 which generated



                                                                 this result



                                                                 transmitted



                                                                 reference range

:



                                                                 10*3/?L. The



                                                                 reference range

 was



                                                                 not used to



                                                                 interpret this



                                                                 result as



                                                                 normal/abnormal

.

 

             GRAN MAT (NEUT) % 68.8 %                                 



             (test code = 770-8)                                        

 

             IMM GRAN % (test code 1.20 %                                 



             = 8569118441)                                        

 

             LYMPH % (test code = 16.6 %                                 



             736-9)                                              

 

             MONO % (test code = 11.0 %                                 



             5905-5)                                             

 

             EOS % (test code = 1.6 %                                  



             713-8)                                              

 

             BASO % (test code = 0.8 %                                  



             706-2)                                              

 

             GRAN MAT x10^3(ANC) 5.06 10*3/uL 1.99-6.95                 



             (test code =                                        



             5200960592)                                         

 

             IMM GRAN x10^3 (test 0.09 10*3/uL 0.00-0.06    H            



             code = 1437470828)                                        

 

             LYMPH x10^3 (test code 1.22 10*3/uL 1.09-3.23                 



             = 731-0)                                            

 

             MONO x10^3 (test code 0.81 10*3/uL 0.36-1.02                 



             = 742-7)                                            

 

             EOS x10^3 (test code = 0.12 10*3/uL 0.06-0.53                 



             711-2)                                              

 

             BASO x10^3 (test code 0.06 10*3/uL 0.01-0.09                 



             = 704-7)                                            

 

             Lab Interpretation Abnormal                               



             (test code = 83983-9)                                        



[id number] 
11:33:57





             Test Item    Value        Reference Range Interpretation Comments

 

             HBsAB (test code = Negative                               



             4244850412)                                         

 

             HBsAb                     mIU/mL                    



             Semi-Quantitative                                        



             (test code =                                        



             0783534988)                                         

 

             SKYLER (test code = Interpretation:                           



             SKYLER)         ?Hepatitis B Surface                           



                          Antibody ? ?  ? ?                           



                          Negative - Patient is                           



                          considered to be not                           



                          immune to infection with                           



                          HBV. ? ? Positive -                           



                          Anti-HBs detected at                           



                          greater than or equal to                           



                          12 mIU/mL. ?Patient is                           



                          considered to be immune                           



                          to infection with HBV. ?                           



[facility] [id number] 
10:57:13





             Test Item    Value        Reference Range Interpretation Comments

 

             HBCM         Negative                               



             Semi-Quantitative                                        



             (test code =                                        



             37948-6)                                            

 

             SKYLER (test code = Biotin has been reported                          

 



             SKYLER)         to cause a negative bias,                           



                          interpret results                           



                          relative to patient's use                           



                          of biotin.                             



[facility] [id number] 
10:57:13





             Test Item    Value        Reference Range Interpretation Comments

 

             HAVM         Negative                               



             Semi-Quantitative                                        



             (test code =                                        



             89615-5)                                            

 

             SKYLER (test code = HAVAb IgM Interpretative                          

 



             SKYLER)         Information: Reactive                           



                          greater than or equal to                           



                          1.2 Biotin has been                           



                          reported to cause a                           



                          negative bias, interpret                           



                          results relative to                           



                          patient's use of biotin.                           



[facility] [place] [place] B SURFACE [id number] 10:52:05





             Test Item    Value        Reference Range Interpretation Comments

 

             HBsAg Semi-Quantitative (test code = Positive     Negative     A   

         



             5195-3)                                             

 

             Lab Interpretation (test code = Abnormal                           

    



             41871-8)                                            



[facility] [id number] 10:30:25





             Test Item    Value        Reference Range Interpretation Comments

 

             ESR (test code =              See_Comment  H             [Automated

 message]



             0584072403)                                         The system [employer]



                                                                 generated this 

result



                                                                 transmitted ref

erence



                                                                 range: 0 - 10 m

m/HR.



                                                                 The reference r

leslie



                                                                 was not used to



                                                                 interpret this 

result



                                                                 as normal/abnor

mal.

 

             Lab Interpretation (test Abnormal                               



             code = 76297-4)                                        



[facility] WITH [id number] 10:27:44





             Test Item    Value        Reference Range Interpretation Comments

 

             HIV          Negative     Negative                  



             Semi-quantitative                                        



             (test code =                                        



             52423-4)                                            

 

             SKYLER (test code = Non-reactive for HIV-1                           



             SKYLER)         antigen and HIV-1/HIV-2                           



                          antibodies. ?No                           



                          laboratory evidence of                           



                          HIV infection. ?Repeat in                           



                          2-4 weeks if acute HIV                           



                          infection is suspected.                           



[facility] (ALB,T.PRO,BILI
T,BU/BC,ALT,AST,ALK PHOS)2022 10:06:02





             Test Item    Value        Reference Range Interpretation Comments

 

             TOTAL BILI (test code = 0987284137) 2.1 mg/dL    0.1-1.1      H    

        

 

             BILI UNCON (test code = 8932505879) 0.5 mg/dL    0.1-1.1           

        

 

             BILI CONJ (test code = 7520021064) 0.4 mg/dL    0.0-0.3      H     

       

 

             T PROTEIN (test code = 5648760911) 7.7 g/dL     6.3-8.2            

       

 

             ALBUMIN (test code = 3043290740) 3.3 g/dL     3.5-5.0      L       

     

 

             ALK PHOS (test code = 6361778288) 544 U/L             H      

      

 

             ALTv (test code = 1742-6) 150 U/L      5-50         H            

 

             AST(SGOT) (test code = 7931336302) 153 U/L      13-40        H     

       

 

             Lab Interpretation (test code = Abnormal                           

    



             69553-4)                                            



[facility] METABOLIC PANEL (NA, K, CL, CO2, 
GLUCOSE, BUN, CREATININE, CA)2022 10:06:02





             Test Item    Value        Reference Range Interpretation Comments

 

             NA (test code = 136 mmol/L   135-145                   



             8244464165)                                         

 

             K (test code = 4.2 mmol/L   3.5-5.0                   



             4555652583)                                         

 

             CL (test code = 106 mmol/L                       



             5561608570)                                         

 

             CO2 TOTAL (test code = 23 mmol/L    23-31                     



             4736619350)                                         

 

             AGAP (test code =              2-16                      



             9615078094)                                         

 

             BUN (test code = 10 mg/dL     7-23                      



             8838384421)                                         

 

             GLUCOSE (test code = 74 mg/dL                         



             9619441292)                                         

 

             CREATININE (test code = 0.78 mg/dL   0.60-1.25                 



             4717467281)                                         

 

             CALCIUM (test code = 7.9 mg/dL    8.6-10.6     L            



             6340426393)                                         

 

             eGFR (test code =              mL/min/1.73m2              



             1831149995)                                         

 

             SKYLER (test code = SKYLER) Association of                           



                          Glomerular Filtration                           



                          Rate (GFR) and Staging                           



                          of Kidney Disease*                           



                          +---------------------                           



                          --+-------------------                           



                          --+-------------------                           



                          ------+| GFR                           



                          (mL/min/1.73 m2) ?|                           



                          With Kidney Damage ?|                           



                          ?Without Kidney                           



                          Damage+---------------                           



                          --------+-------------                           



                          --------+-------------                           



                          ------------+| ?>90 ?                           



                          ? ? ? ? ? ? ? ?|                           



                          ?Stage one ? ? ? ? ?|                           



                          ? Normal ? ? ? ? ? ? ?                           



                          ?+--------------------                           



                          ---+------------------                           



                          ---+------------------                           



                          -------+| ?60-89 ? ? ?                           



                          ? ? ? ? ?| ?Stage two                           



                          ? ? ? ? ?| ? Decreased                           



                          GFR ? ? ? ?                            



                          +---------------------                           



                          --+-------------------                           



                          --+-------------------                           



                          ------+| ?30-59 ? ? ?                           



                          ? ? ? ? ?| ?Stage                           



                          three ? ? ? ?| ? Stage                           



                          three ? ? ? ? ?                           



                          +---------------------                           



                          --+-------------------                           



                          --+-------------------                           



                          ------+| ?15-29 ? ? ?                           



                          ? ? ? ? ?| ?Stage four                           



                          ? ? ? ? | ? Stage four                           



                          ? ? ? ? ?                              



                          ?+--------------------                           



                          ---+------------------                           



                          ---+------------------                           



                          -------+| ?<15 (or                           



                          dialysis) ? ?| ?Stage                           



                          five ? ? ? ? | ? Stage                           



                          five ? ? ? ? ?                           



                          ?+--------------------                           



                          ---+------------------                           



                          ---+------------------                           



                          -------+ *Each stage                           



                          assumes the associated                           



                          GFR level has been in                           



                          effect for at least                           



                          three months. ?Stages                           



                          1 to 5, with or                           



                          without kidney                           



                          disease, indicate                           



                          chronic kidney                           



                          disease. Notes:                           



                          Determination of                           



                          stages one and two                           



                          (with eGFR                             



                          >59mL/min/1.73 m2)                           



                          requires estimation of                           



                          kidney damage for at                           



                          least three months as                           



                          defined by structural                           



                          or functional                           



                          abnormalities of the                           



                          kidney, manifested by                           



                          either:Pathological                           



                          abnormalities or                           



                          Markers of kidney                           



                          damage (including                           



                          abnormalities in the                           



                          composition of the                           



                          blood or urine or                           



                          abnormalities in                           



                          imaging tests).                           

 

             Lab Interpretation Abnormal                               



             (test code = 90486-5)                                        



[id number] 09:54:02





             Test Item    Value        Reference    Interpretation Comments



                                       Range                     

 

             D-DIMER (test code =              See_Comment  H             [Autom

ated



             0322302570)                                         message] The



                                                                 system which



                                                                 generated this



                                                                 result



                                                                 transmitted



                                                                 reference range

:



                                                                 <0.50 ?g/mL



                                                                 (FEU). The



                                                                 reference range



                                                                 was not used to



                                                                 interpret this



                                                                 result as



                                                                 normal/abnormal

.

 

             SKYLER (test code = This test may be                           



             SKYLER)         used in conjunction                           



                          with a clinical                           



                          pretest probability                           



                          (PTP) assessment                           



                          model to exclude                           



                          venous                                 



                          thromboembolism                           



                          (VTE) in patients                           



                          suspected of deep                           



                          venous thrombosis                           



                          (DVT) and pulmonary                           



                          embolism (PE)  A                           



                          D-Dimer value less                           



                          than 0.50 ?g/ml                           



                          (FEU) has a negative                           



                          predicative value of                           



                          96 to 100% (95%                           



                          CI)and 97 to 100%                           



                          (95% CI) as an aid                           



                          in the diagnosis of                           



                          deep vein thrombosis                           



                          (DVT) and pulmonary                           



                          embolism when there                           



                          is low or moderate                           



                          pretest probability                           



                          of PE or DVT.                           



                          D-Dimer values are                           



                          expressed in initial                           



                          fibrinogen                             



                          equivalent units                           



                          (FEU)" The assay                           



                          results should be                           



                          used with other                           



                          information,                           



                          including the                           



                          clinical context, in                           



                          forming a diagnosis.                           



                                                                 

 

             Lab Interpretation Abnormal                               



             (test code =                                        



             24872-2)                                            



[id number]





             Test Item    Value        Reference Range Interpretation Comments

 

             CK (test code = 3488482337) <20                 L            

 

             Lab Interpretation (test code = Abnormal                           

    



             11669-3)                                            



[facility] [id number] 08:58:33





             Test Item    Value        Reference Range Interpretation Comments

 

             TSH (test code =              See_Comment                [Automated

 message]



             9201193374)                                         The system [employer]



                                                                 generated this 

result



                                                                 transmitted ref

erence



                                                                 range: 0.45 - 4

.70



                                                                 mIU/L. The refe

rence



                                                                 range was not u

sed to



                                                                 interpret this 

result



                                                                 as normal/abnor

mal.

 

             Lab Interpretation (test Normal                                 



             code = 80566-9)                                        



[id number] 08:37:09





             Test Item    Value        Reference Range Interpretation Comments

 

             NT-proBNP (test code 373 pg/mL    See_Comment  H             [Autom

ated



             = 0942849189)                                        message] The



                                                                 system which



                                                                 generated this



                                                                 result



                                                                 transmitted



                                                                 reference range

:



                                                                 <=125. The



                                                                 reference range



                                                                 was not used to



                                                                 interpret this



                                                                 result as



                                                                 normal/abnormal

.

 

             SKYLER (test code = SKYLER) Biotin has been                           



                          reported to                            



                          cause a negative                           



                          bias, interpret                           



                          results relative                           



                          to patient's use                           



                          of biotin.                             

 

             Lab Interpretation Abnormal                               



             (test code = 95503-8)                                        



[facility]





             Test Item    Value        Reference Range Interpretation Comments

 

             ALCOHOL (test code = <10          mg/dL                     



             6192923195)                                         

 

             SKYLER (test code = Toxic Greater than or                           



             SKYLER)         equal to 80 mg/dL. NOTE:                           



                          Whole blood values are                           



                          approximately 10% to 15%                           



                          lower than serum and                           



                          plasma.                                



[facility]02





             Test Item    Value        Reference Range Interpretation Comments

 

             ACETAMINOP (test code = <10.0        10.0-30.0    L            



             6944596761)                                         

 

             SKYLER (test code = SKYLER) Toxic: Greater than                          

 



                          200 ug/mL @ 4 hour                           



                          post ingestion or                           



                          greater than 50                           



                          ug/mL @ 12 hour post                           



                          ingestion                              

 

             Lab Interpretation (test Abnormal                               



             code = 93949-3)                                        



[facility]35





             Test Item    Value        Reference Range Interpretation Comments

 

             LIPASE (test code = 1121163719) 160 U/L      0-220                 

    

 

             Lab Interpretation (test code = Normal                             

    



             19370-3)                                            



[facility] [id number] 02:03:45





             Test Item    Value        Reference Range Interpretation Comments

 

             EBV Mononucleosis Screen (test code Negative     Negative          

        



             = 7283527728)                                        

 

             Lab Interpretation (test code = Normal                             

    



             38098-3)                                            



[id number]57





             Test Item    Value        Reference    Interpretation Comments



                                       Range                     

 

             TROPONIN I (test 0.001 ng/mL  See_Comment                [Automated



             code = 2520693185)                                        message] 

The



                                                                 system which



                                                                 generated this



                                                                 result



                                                                 transmitted



                                                                 reference range

:



                                                                 <=0.034. The



                                                                 reference range



                                                                 was not used to



                                                                 interpret this



                                                                 result as



                                                                 normal/abnormal

.

 

             SKYLER (test code = Reference (Normal)                           



             SKYLER)         Range (defined by                           



                          the 99th percentile                           



                          reference limit): <=                           



                          0.034 ng/mL Note:                           



                          Cardiac troponin                           



                          begins to rise 3-4                           



                          hours after the                           



                          onset of ischemia.                           



                          Repeat in 4-6 hours                           



                          if the sample was                           



                          drawn within 3-4                           



                          hours of the onset                           



                          of the symptom and                           



                          found normal.                           



                          Diagnosis of                           



                          myocardial injury is                           



                          made with acute                           



                          changes in cTn                           



                          concentrations with                           



                          at least one serial                           



                          sample above the                           



                          99th percentile                           



                          upper reference                           



                          limit (URL), taken                           



                          together with the                           



                          patient's clinical                           



                          presentation.                           



                          Biotin has been                           



                          reported to cause a                           



                          negative bias,                           



                          interpret results                           



                          relative to                            



                          patient's use of                           



                          biotin.                                

 

             Lab Interpretation Normal                                 



             (test code =                                        



             42430-9)                                            



[facility]. METABOLIC PANEL ([id number])2022 
23:45:32





             Test Item    Value        Reference Range Interpretation Comments

 

             NA (test code = 133 mmol/L   135-145      L            



             4184332045)                                         

 

             K (test code = 4.3 mmol/L   3.5-5.0                   



             6091209173)                                         

 

             CL (test code = 102 mmol/L                       



             0965646142)                                         

 

             CO2 TOTAL (test code = 23 mmol/L    23-31                     



             7337242269)                                         

 

             AGAP (test code =              2-16                      



             8763327181)                                         

 

             BUN (test code = 14 mg/dL     7-23                      



             7143521355)                                         

 

             GLUCOSE (test code = 112 mg/dL           H            



             6665088298)                                         

 

             CREATININE (test code = 0.74 mg/dL   0.60-1.25                 



             5679201941)                                         

 

             TOTAL BILI (test code = 1.2 mg/dL    0.1-1.1      H            



             8481616345)                                         

 

             CALCIUM (test code = 7.9 mg/dL    8.6-10.6     L            



             5322441267)                                         

 

             T PROTEIN (test code = 7.4 g/dL     6.3-8.2                   



             6629265448)                                         

 

             ALBUMIN (test code = 3.2 g/dL     3.5-5.0      L            



             3669564477)                                         

 

             ALK PHOS (test code = 602 U/L             H            



             9750332431)                                         

 

             ALTv (test code = 154 U/L      5-50         H            



             1742-6)                                             

 

             AST(SGOT) (test code = 207 U/L      13-40        H            



             0151226221)                                         

 

             eGFR (test code =              mL/min/1.73m2              



             5014890731)                                         

 

             SKYLER (test code = SKYLER) Association of                           



                          Glomerular Filtration                           



                          Rate (GFR) and Staging                           



                          of Kidney Disease*                           



                          +---------------------                           



                          --+-------------------                           



                          --+-------------------                           



                          ------+| GFR                           



                          (mL/min/1.73 m2) ?|                           



                          With Kidney Damage ?|                           



                          ?Without Kidney                           



                          Damage+---------------                           



                          --------+-------------                           



                          --------+-------------                           



                          ------------+| ?>90 ?                           



                          ? ? ? ? ? ? ? ?|                           



                          ?Stage one ? ? ? ? ?|                           



                          ? Normal ? ? ? ? ? ? ?                           



                          ?+--------------------                           



                          ---+------------------                           



                          ---+------------------                           



                          -------+| ?60-89 ? ? ?                           



                          ? ? ? ? ?| ?Stage two                           



                          ? ? ? ? ?| ? Decreased                           



                          GFR ? ? ? ?                            



                          +---------------------                           



                          --+-------------------                           



                          --+-------------------                           



                          ------+| ?30-59 ? ? ?                           



                          ? ? ? ? ?| ?Stage                           



                          three ? ? ? ?| ? Stage                           



                          three ? ? ? ? ?                           



                          +---------------------                           



                          --+-------------------                           



                          --+-------------------                           



                          ------+| ?15-29 ? ? ?                           



                          ? ? ? ? ?| ?Stage four                           



                          ? ? ? ? | ? Stage four                           



                          ? ? ? ? ?                              



                          ?+--------------------                           



                          ---+------------------                           



                          ---+------------------                           



                          -------+| ?<15 (or                           



                          dialysis) ? ?| ?Stage                           



                          five ? ? ? ? | ? Stage                           



                          five ? ? ? ? ?                           



                          ?+--------------------                           



                          ---+------------------                           



                          ---+------------------                           



                          -------+ *Each stage                           



                          assumes the associated                           



                          GFR level has been in                           



                          effect for at least                           



                          three months. ?Stages                           



                          1 to 5, with or                           



                          without kidney                           



                          disease, indicate                           



                          chronic kidney                           



                          disease. Notes:                           



                          Determination of                           



                          stages one and two                           



                          (with eGFR                             



                          >59mL/min/1.73 m2)                           



                          requires estimation of                           



                          kidney damage for at                           



                          least three months as                           



                          defined by structural                           



                          or functional                           



                          abnormalities of the                           



                          kidney, manifested by                           



                          either:Pathological                           



                          abnormalities or                           



                          Markers of kidney                           



                          damage (including                           



                          abnormalities in the                           



                          composition of the                           



                          blood or urine or                           



                          abnormalities in                           



                          imaging tests).                           

 

             Lab Interpretation Abnormal                               



             (test code = 10710-7)                                        



[facility] WITH [id number] 23:28:46





             Test Item    Value        Reference Range Interpretation Comments

 

             WBC (test code =              See_Comment                [Automated



             6690-2)                                             message] The sy

stem



                                                                 which generated



                                                                 this result



                                                                 transmitted



                                                                 reference range

:



                                                                 4.20 - 10.70



                                                                 10*3/?L. The



                                                                 reference range

 was



                                                                 not used to



                                                                 interpret this



                                                                 result as



                                                                 normal/abnormal

.

 

             RBC (test code =              See_Comment  L             [Automated



             789-8)                                              message] The sy

stem



                                                                 which generated



                                                                 this result



                                                                 transmitted



                                                                 reference range

:



                                                                 4.26 - 5.52



                                                                 10*6/?L. The



                                                                 reference range

 was



                                                                 not used to



                                                                 interpret this



                                                                 result as



                                                                 normal/abnormal

.

 

             HGB (test code = 11.3 g/dL    12.2-16.4    L            



             718-7)                                              

 

             HCT (test code = 34.0 %       38.4-49.3    L            



             4544-3)                                             

 

             MCV (test code = 90.9 fL      81.7-95.6                 



             787-2)                                              

 

             MCH (test code = 30.2 pg      26.1-32.7                 



             785-6)                                              

 

             MCHC (test code = 33.2 g/dL    31.2-35.0                 



             786-4)                                              

 

             RDW-SD (test code = 59.5 fL      38.5-51.6    H            



             77591-8)                                            

 

             RDW-CV (test code = 17.9 %       12.1-15.4    H            



             788-0)                                              

 

             PLT (test code =              See_Comment  H             [Automated



             777-3)                                              message] The sy

stem



                                                                 which generated



                                                                 this result



                                                                 transmitted



                                                                 reference range

:



                                                                 150 - 328 10*3/

?L.



                                                                 The reference r

leslie



                                                                 was not used to



                                                                 interpret this



                                                                 result as



                                                                 normal/abnormal

.

 

             MPV (test code = 9.8 fL       9.8-13.0                  



             83230-7)                                            

 

             NRBC/100 WBC (test              See_Comment                [Automat

ed



             code = 3722775561)                                        message] 

The system



                                                                 which generated



                                                                 this result



                                                                 transmitted



                                                                 reference range

:



                                                                 0.0 - 10.0 /100



                                                                 WBCs. The refer

ence



                                                                 range was not u

sed



                                                                 to interpret th

is



                                                                 result as



                                                                 normal/abnormal

.

 

             NRBC x10^3 (test code <0.01        See_Comment                [Auto

mated



             = [id number])                                        message] The s

ystem



                                                                 which generated



                                                                 this result



                                                                 transmitted



                                                                 reference range

:



                                                                 10*3/?L. The



                                                                 reference range

 was



                                                                 not used to



                                                                 interpret this



                                                                 result as



                                                                 normal/abnormal

.

 

             GRAN MAT (NEUT) % 74.8 %                                 



             (test code = 770-8)                                        

 

             IMM GRAN % (test code 1.20 %                                 



             = [id number])                                        

 

             LYMPH % (test code = 13.3 %                                 



             736-9)                                              

 

             MONO % (test code = 8.4 %                                  



             5905-5)                                             

 

             EOS % (test code = 1.5 %                                  



             713-8)                                              

 

             BASO % (test code = 0.8 %                                  



             706-2)                                              

 

             GRAN MAT x10^3(ANC) 5.58 10*3/uL 1.99-6.95                 



             (test code =                                        



             1692418893)                                         

 

             IMM GRAN x10^3 (test 0.09 10*3/uL 0.00-0.06    H            



             code = [id number])                                        

 

             LYMPH x10^3 (test code 0.99 10*3/uL 1.09-3.23    L            



             = 731-0)                                            

 

             MONO x10^3 (test code 0.63 10*3/uL 0.36-1.02                 



             = 742-7)                                            

 

             EOS x10^3 (test code = 0.11 10*3/uL 0.06-0.53                 



             711-2)                                              

 

             BASO x10^3 (test code 0.06 10*3/uL 0.01-0.09                 



             = 704-7)                                            

 

             Lab Interpretation Abnormal                               



             (test code = 88599-3)                                        



[facility]

## 2022-04-22 NOTE — ER
Nurse's Notes                                                                                     

 [facility]                                                                 

Name: Shawn Rocha                                                                               

Age: 32 yrs                                                                                       

Sex: Male                                                                                         

: 1990                                                                                   

MRN: [id number]                                                                                   

Arrival Date: 2022                                                                          

Time: 07:50                                                                                       

Account#: [id number]                                                                            

Bed 19                                                                                            

Private MD:                                                                                       

Diagnosis: Post-traumatic headache, unspecified, not intractable-right middle crainal fossa       

  hemorrhage, stable 22;Acute pansinusitis;Cellulitis and acute lymphangitis of              

  other parts of limb;Acute embolism and thrombosis of superficial veins of left upper            

  extremity                                                                                       

                                                                                                  

Presentation:                                                                                     

                                                                                             

07:57 Chief complaint: Patient states: left arm red swollen since IV fluids/antibiotics given iw  

      4-15 at [facility], was seen here on  and started on clindamycin but arm has gotten        

      worse. Coronavirus screen: At this time, the client does not indicate any symptoms          

      associated with coronavirus-19. Ebola Screen: Patient negative for fever greater than       

      or equal to 101.5 degrees Fahrenheit, and additional compatible Ebola Virus Disease         

      symptoms Patient denies exposure to infectious person. Patient denies travel to an          

      Ebola-affected area in the 21 days before illness onset. No symptoms or risks               

      identified at this time. Initial Sepsis Screen: Does the patient meet any 2 criteria?       

      No. Patient's initial sepsis screen is negative. Does the patient have a suspected          

      source of infection? No. Patient's initial sepsis screen is negative. Risk Assessment:      

      Do you want to hurt yourself or someone else? Patient reports no desire to harm self or     

      others. Onset of symptoms was 2022.                                               

07:57 Method Of Arrival: Ambulatory                                                           iw  

07:57 Acuity: TODD 3                                                                           iw  

                                                                                                  

Historical:                                                                                       

- Allergies:                                                                                      

08:01 No Known Allergies;                                                                     iw  

- Home Meds:                                                                                      

08:01 Keppra Oral [Active]; Clindamycin Oral [Active];                                        iw  

- PMHx:                                                                                           

08:01 drug abuse; HEP C;                                                                      iw  

- PSHx:                                                                                           

08:01 Abscess Drain from RUQ;                                                                 iw  

                                                                                                  

- Immunization history:: Adult Immunizations unknown.                                             

- Social history:: Smoking status: unknown.                                                       

                                                                                                  

                                                                                                  

Screenin:34 Abuse screen: Denies threats or abuse. Nutritional screening: No deficits noted.        jd3 

      Tuberculosis screening: No symptoms or risk factors identified. Fall Risk Ambulatory        

      Aid- None/Bed Rest/Nurse Assist (0 pts). Gait- Normal/Bed Rest/Wheelchair (0 pts)           

      Mental Status- Oriented to own ability (0 pts). Total Clifton Fall Scale indicates No         

      Risk (0-24 pts).                                                                            

                                                                                                  

Assessment:                                                                                       

08:25 General: Appears in no apparent distress. uncomfortable, Behavior is calm, cooperative, jd3 

      appropriate for age. Pain: Complains of pain in left arm Quality of pain is described       

      as shooting, tender, throbbing. Neuro: Level of Consciousness is awake, alert, obeys        

      commands, Oriented to person, place, time, situation. Cardiovascular: Capillary refill      

      < 3 seconds Patient's skin is warm and dry. Respiratory: Airway is patent Respiratory       

      effort is even, unlabored, Respiratory pattern is regular, symmetrical, Denies cough,       

      shortness of breath. GI: No signs and/or symptoms were reported involving the               

      gastrointestinal system. : No signs and/or symptoms were reported regarding the           

      genitourinary system. EENT: Sclera/Cornea are reddened in left eye. Derm: Skin is           

      intact, Skin is dry, Skin is normal, Skin temperature is warm splotchy red streaking        

      noted to left forearm and traveling up throw the upper left arm. Musculoskeletal:           

      Circulation, motion, and sensation intact. Range of motion: intact in all extremities,      

      Swelling present in left forearm.                                                           

09:46 Reassessment: Patient appears in no apparent distress at this time. No changes from     jd3 

      previously documented assessment. Patient and/or family updated on plan of care and         

      expected duration. Pain level reassessed. Patient is alert, oriented x 3, equal             

      unlabored respirations, skin warm/dry/pink.                                                 

10:46 Reassessment: Patient appears in no apparent distress at this time. Patient and/or      jd3 

      family updated on plan of care and expected duration. Pain level reassessed. Patient is     

      alert, oriented x 3, equal unlabored respirations, skin warm/dry/pink. awaiting             

      admission.                                                                                  

11:45 Reassessment: No changes from previously documented assessment. Patient and/or family   jd3 

      updated on plan of care and expected duration. Pain level reassessed. Patient is alert,     

      oriented x 3, equal unlabored respirations, skin warm/dry/pink. awaiting admission.         

14:10 Reassessment: Patient appears in no apparent distress at this time. No changes from     jd3 

      previously documented assessment. Patient and/or family updated on plan of care and         

      expected duration. Pain level reassessed. Patient is alert, oriented x 3, equal             

      unlabored respirations, skin warm/dry/pink.                                                 

                                                                                                  

Vital Signs:                                                                                      

07:57  / 95; Pulse 76; Resp 16; Temp 98.4; Pulse Ox 100% on R/A; Weight 67.13 kg;       iw  

      Height 5 ft. 7 in. (170.18 cm); Pain 8/10;                                                  

09:46  / 85; Pulse 76; Resp 18 S; Pulse Ox 99% on R/A;                                  jd3 

10:46  / 86; Pulse 75; Resp 17 S; Pulse Ox 99% on R/A;                                  jd3 

11:45  / 96; Pulse 65; Resp 17 S; Pulse Ox 99% on R/A;                                  jd3 

14:10  / 92; Pulse 67; Resp 16 S; Pulse Ox 98% on R/A;                                  jd3 

07:57 Body Mass Index 23.18 (67.13 kg, 170.18 cm)                                             iw  

                                                                                                  

ED Course:                                                                                        

07:50 Patient arrived in ED.                                                                  ds1 

07:56 Franc Wright RN is Primary Nurse.                                                  jd3 

07:59 Sunil Peng MD is Attending Physician.                                             telma 

08:00 Triage completed.                                                                       iw  

08:01 Arm band placed on.                                                                     iw  

08:25 Inserted saline lock: 22 gauge in right forearm, using aseptic technique. Blood         jd3 

      collected.                                                                                  

08:34 Patient has correct armband on for positive identification. Placed in gown. Bed in low  jd3 

      position. Call light in reach. Side rails up X 1. Pulse ox on. NIBP on.                     

08:38 CT Head Brain wo Cont In Process Unspecified.                                           EDMS

09:13 UPPER EXTREMITY VENOUS UNILATE In Process Unspecified.                                  EDMS

09:27 Mary Mirza MD is Hospitalizing Provider.                                           telma 

14:10 No provider procedures requiring assistance completed. Patient admitted, IV remains in  jd3 

      place.                                                                                      

                                                                                                  

Administered Medications:                                                                         

09:20 Drug: NS 0.9% 1000 ml Route: IV; Rate: 1 bolus; Site: right forearm;                    jd3 

10:20 Follow up: Response: No adverse reaction; IV Status: Completed infusion; IV Intake:     jd3 

      1000ml                                                                                      

09:20 Drug: Unasyn (ampicillin-sulbactam) 3 grams Route: IVPB; Infused Over: 30 mins; Site:   jd3 

      right forearm;                                                                              

10:20 Follow up: Response: No adverse reaction; IV Status: Completed infusion                 jd3 

09:20 Drug: morphine 2 mg Route: IVP; Site: right forearm;                                    jd3 

09:20 Drug: Zofran (Ondansetron) 4 mg Route: IVP; Site: right forearm;                        jd3 

10:20 Follow up: Response: No adverse reaction                                                jd3 

09:40 Drug: Lovenox (enoxaparin) 40 mg Route: Sub-Q; Site: abdomen;                           jd3 

10:40 Follow up: Response: No adverse reaction                                                jd3 

09:45 Drug: morphine 2 mg Route: IVP; Site: right forearm;                                    jd3 

10:20 Follow up: Response: No adverse reaction; RASS: Alert and Calm (0)                      jd3 

10:45 Follow up: Response: No adverse reaction; RASS: Alert and Calm (0)                      jd3 

11:26 Drug: morphine 4 mg Route: IVP; Site: right forearm;                                    jd3 

11:46 Follow up: Response: No adverse reaction; RASS: Alert and Calm (0)                      jd3 

                                                                                                  

                                                                                                  

Intake:                                                                                           

10:20 IV: 1000ml; Total: 1000ml.                                                              jd3 

                                                                                                  

Outcome:                                                                                          

09:42 Decision to Hospitalize by Provider.                                                    telma 

15:06 Admitted to Med/surg accompanied by tech, via wheelchair, room 213, with chart, Report  jd3 

      called to  report given to nurse for room 213                                               

15:06 Condition: stable                                                                           

15:06 Instructed on the need for admit, Demonstrated understanding of instructions.               

15:07 Patient left the ED.                                                                    jd3 

                                                                                                  

Signatures:                                                                                       

Dispatcher [employer]                           EDSunil Garcia MD MD cha Sanford, Demi                                ds1                                                  

Alysha Patterson RN RN iw Davies, Jonathon, RN RN   jd3                                                  

                                                                                                  

Corrections: (The following items were deleted from the chart)                                    

08:34 08:25 EENT: No signs and/or symptoms were reported regarding the EENT system. jd3       jd3 

10:48 10:48 Response: No adverse reaction; RASS: Alert and Calm (0) jd3                       jd3 

                                                                                                  

**************************************************************************************************

## 2022-04-22 NOTE — EDPHYS
Physician Documentation                                                                           

 [facility]                                                                 

Name: Shawn Rocha                                                                               

Age: 32 yrs                                                                                       

Sex: Male                                                                                         

: 1990                                                                                   

MRN: [id number]                                                                                   

Arrival Date: 2022                                                                          

Time: 07:50                                                                                       

Account#: [id number]                                                                            

Bed 19                                                                                            

Private MD:                                                                                       

ED Physician Sunil Peng                                                                      

HPI:                                                                                              

                                                                                             

08:12 This 32 yrs old  Male presents to ER via Ambulatory with complaints of Arm     telma 

      Pain.                                                                                       

08:12 The patient or guardian complains of decreased range of motion, pain, swelling,         telma 

      tenderness. The complaints affect the dorsal aspect of left forearm and palmar aspect       

      of left forearm. Context: The problem was sustained at home, resulted from.                 

                                                                                                  

Historical:                                                                                       

- Allergies:                                                                                      

08:01 No Known Allergies;                                                                     iw  

- Home Meds:                                                                                      

08:01 Keppra Oral [Active]; Clindamycin Oral [Active];                                        iw  

- PMHx:                                                                                           

08:01 drug abuse; HEP C;                                                                      iw  

- PSHx:                                                                                           

08:01 Abscess Drain from RUQ;                                                                 iw  

                                                                                                  

- Immunization history:: Adult Immunizations unknown.                                             

- Social history:: Smoking status: unknown.                                                       

                                                                                                  

                                                                                                  

ROS:                                                                                              

08:19 Constitutional: Negative for fever, chills, and weight loss, Eyes: Negative for injury, telma 

      pain, redness, and discharge, ENT: Negative for injury, pain, and discharge, Neck:          

      Negative for injury, pain, and swelling, Cardiovascular: Negative for chest pain,           

      palpitations, and edema, Respiratory: Negative for shortness of breath, cough,              

      wheezing, and pleuritic chest pain, Abdomen/GI: Negative for abdominal pain, nausea,        

      vomiting, diarrhea, and constipation, Back: Negative for injury and pain, : Negative      

      for injury, bleeding, discharge, and swelling, MS/Extremity: Negative for injury and        

      deformity, Neuro: Negative for headache, weakness, numbness, tingling, and seizure,         

      Psych: Negative for depression, anxiety, suicide ideation, homicidal ideation, and          

      hallucinations, Allergy/Immunology: Negative for hives, rash, and allergies, Endocrine:     

      Negative for neck swelling, polydipsia, polyuria, polyphagia, and marked weight             

      changes, Hematologic/Lymphatic: Negative for swollen nodes, abnormal bleeding, and          

      unusual bruising.                                                                           

08:19 MS/extremity: Positive for decreased range of motion, erythema, pain, swelling,             

      tenderness, of the dorsal aspect of left forearm and palmar aspect of left forearm.         

08:19 Skin: Positive for                                                                          

                                                                                                  

Exam:                                                                                             

08:19 Constitutional:  This is a well developed, well nourished patient who is awake, alert,  telma 

      and in no acute distress. Head/Face:  Normocephalic, atraumatic. Eyes:  Pupils equal        

      round and reactive to light, extra-ocular motions intact.  Lids and lashes normal.          

      Conjunctiva and sclera are non-icteric and not injected.  Cornea within normal limits.      

      Periorbital areas with no swelling, redness, or edema. ENT:  Nares patent. No nasal         

      discharge, no septal abnormalities noted.  Tympanic membranes are normal and external       

      auditory canals are clear.  Oropharynx with no redness, swelling, or masses, exudates,      

      or evidence of obstruction, uvula midline.  Mucous membranes moist. Neck:  Trachea          

      midline, no thyromegaly or masses palpated, and no cervical lymphadenopathy.  Supple,       

      full range of motion without nuchal rigidity, or vertebral point tenderness.  No            

      Meningismus. Chest/axilla:  Normal chest wall appearance and motion.  Nontender with no     

      deformity.  No lesions are appreciated. Cardiovascular:  Regular rate and rhythm with a     

      normal S1 and S2.  No gallops, murmurs, or rubs.  Normal PMI, no JVD.  No pulse             

      deficits. Respiratory:  Lungs have equal breath sounds bilaterally, clear to                

      auscultation and percussion.  No rales, rhonchi or wheezes noted.  No increased work of     

      breathing, no retractions or nasal flaring. Abdomen/GI:  Soft, non-tender, with normal      

      bowel sounds.  No distension or tympany.  No guarding or rebound.  No evidence of           

      tenderness throughout. Back:  No spinal tenderness.  No costovertebral tenderness.          

      Full range of motion. Male :  Normal genitalia with no discharge or lesions. Skin:        

      Warm, dry with normal turgor.  Normal color with no rashes, no lesions, and no evidence     

      of cellulitis. Neuro:  Awake and alert, GCS 15, oriented to person, place, time, and        

      situation.  Cranial nerves II-XII grossly intact.  Motor strength 5/5 in all                

      extremities.  Sensory grossly intact.  Cerebellar exam normal.  Normal gait. Psych:         

      Awake, alert, with orientation to person, place and time.  Behavior, mood, and affect       

      are within normal limits.                                                                   

08:19 Musculoskeletal/extremity: Extremities: grossly normal except: noted in the palmar          

      aspect of left forearm and dorsal aspect of left forearm:                                   

08:19 Skin: cellulitis, that is moderate, confluent, on the  dorsal aspect of left forearm        

      and palmar aspect of left forearm.                                                          

                                                                                                  

Vital Signs:                                                                                      

07:57  / 95; Pulse 76; Resp 16; Temp 98.4; Pulse Ox 100% on R/A; Weight 67.13 kg;       iw  

      Height 5 ft. 7 in. (170.18 cm); Pain 8/10;                                                  

09:46  / 85; Pulse 76; Resp 18 S; Pulse Ox 99% on R/A;                                  jd3 

10:46  / 86; Pulse 75; Resp 17 S; Pulse Ox 99% on R/A;                                  jd3 

11:45  / 96; Pulse 65; Resp 17 S; Pulse Ox 99% on R/A;                                  jd3 

14:10  / 92; Pulse 67; Resp 16 S; Pulse Ox 98% on R/A;                                  jd3 

07:57 Body Mass Index 23.18 (67.13 kg, 170.18 cm)                                             iw  

                                                                                                  

MDM:                                                                                              

07:59 Patient medically screened.                                                             telma 

08:21 Differential diagnosis: contusion. Data reviewed: vital signs, nurses notes, lab test   telma 

      result(s), radiologic studies, CT scan, doppler, plain films. Data interpreted: Cardiac     

      monitor: rate is 76 beats/min, rhythm is regular, Pulse oximetry: on room air is 100 %.     

      Test interpretation: by ED physician or midlevel provider: plain radiologic studies.        

      Counseling: I had a detailed discussion with the patient and/or guardian regarding: the     

      historical points, exam findings, and any diagnostic results supporting the                 

      discharge/admit diagnosis, lab results, radiology results.                                  

                                                                                                  

                                                                                             

08:11 Order name: CBC with Diff; Complete Time: 08:59                                         telma 

                                                                                             

08:11 Order name: Comprehensive Metabolic Panel; Complete Time: 08:59                         telma 

                                                                                             

08:11 Order name: PT-INR; Complete Time: 08:59                                                telma 

                                                                                             

08:11 Order name: Pth,Intact; Complete Time: 09:24                                            telma 

                                                                                             

11:34 Order name: COVID-19 SARS RT PCR (Document "Date of Onset" if Symptomatic)              dh3 

                                                                                             

08:16 Order name: UPPER EXTREMITY VENOUS UNILATE; Complete Time: 09:24                        EDMS

                                                                                             

08:18 Order name: CT Head Brain wo Cont; Complete Time: 08:59                                 telma 

                                                                                             

13:20 Order name: CBC with Automated Diff                                                     EDMS

                                                                                             

13:20 Order name: CBC with Automated Diff                                                     EDMS

                                                                                             

13:20 Order name: Comprehensive Metabolic Panel                                               EDMS

                                                                                             

13:20 Order name: Comprehensive Metabolic Panel                                               EDMS

                                                                                             

08:25 Order name: IV; Complete Time: 08:25                                                    jd3 

                                                                                             

09:26 Order name: Misc. Order: elevate left upper extremity; Complete Time: 09:33             telma 

                                                                                             

13:20 Order name: NPO                                                                         EDMS

                                                                                                  

Administered Medications:                                                                         

09:20 Drug: NS 0.9% 1000 ml Route: IV; Rate: 1 bolus; Site: right forearm;                    jd3 

10:20 Follow up: Response: No adverse reaction; IV Status: Completed infusion; IV Intake:     jd3 

      1000ml                                                                                      

09:20 Drug: Unasyn (ampicillin-sulbactam) 3 grams Route: IVPB; Infused Over: 30 mins; Site:   jd3 

      right forearm;                                                                              

10:20 Follow up: Response: No adverse reaction; IV Status: Completed infusion                 jd3 

09:20 Drug: morphine 2 mg Route: IVP; Site: right forearm;                                    jd3 

09:20 Drug: Zofran (Ondansetron) 4 mg Route: IVP; Site: right forearm;                        jd3 

10:20 Follow up: Response: No adverse reaction                                                jd3 

09:40 Drug: Lovenox (enoxaparin) 40 mg Route: Sub-Q; Site: abdomen;                           jd3 

10:40 Follow up: Response: No adverse reaction                                                jd3 

09:45 Drug: morphine 2 mg Route: IVP; Site: right forearm;                                    jd3 

10:20 Follow up: Response: No adverse reaction; RASS: Alert and Calm (0)                      jd3 

10:45 Follow up: Response: No adverse reaction; RASS: Alert and Calm (0)                      jd3 

11:26 Drug: morphine 4 mg Route: IVP; Site: right forearm;                                    jd3 

11:46 Follow up: Response: No adverse reaction; RASS: Alert and Calm (0)                      jd3 

                                                                                                  

                                                                                                  

Disposition Summary:                                                                              

22 09:42                                                                                    

Hospitalization Ordered                                                                           

      Hospitalization Status: Inpatient Admission                                             telma 

      Provider: Mary Mirza cha 

      Location: Telemetry/MedSurg (Inpatient)                                                 telma 

      Condition: Fair                                                                         telma 

      Problem: new                                                                            telma 

      Symptoms: have improved                                                                 telma 

      Bed/Room Type: Standard                                                                 telma 

      Room Assignment: 213(22 14:21)                                                    ss  

      Diagnosis                                                                                   

        - Post-traumatic headache, unspecified, not intractable - right middle crainal fossa  telma 

      hemorrhage, stable 22                                                                  

        - Acute pansinusitis                                                                  telma 

        - Cellulitis and acute lymphangitis of other parts of limb                            telma 

        - Acute embolism and thrombosis of superficial veins of left upper extremity          telma 

      Discharge Instructions:                                                                     

        - Discharge Summary Sheet                                                             jd3 

      Forms:                                                                                      

        - Medication Reconciliation Form                                                      telma 

        - SBAR form                                                                           [facility] 

Signatures:                                                                                       

Dispatcher [employer]                           EDMS                                                 

Sunil Peng MD MD cha Williams, Irene RN                     [place]   iw                                                   

Eloisa Young RN RN                                                      

Franc Wright RN                    RN   jd3                                                  

                                                                                                  

Corrections: (The following items were deleted from the chart)                                    

08:14 08:12 Extremity Venous Uni Ltd+US.RAD.BRZ ordered. EDMS                                 EDMS

12:35 11:12 COVID 19 CPL+MR.LAB.BRZ ordered. EDMS                                             EDMS

14:21 09:42 telma                                                                               ss  

                                                                                                  

**************************************************************************************************

## 2022-04-22 NOTE — RAD REPORT
EXAM DESCRIPTION:  CT - Head Brain Wo Cont - 4/22/2022 8:36 am

 

CLINICAL HISTORY:  Head trauma, intracranial venous injury suspected

 

COMPARISON:  Facial Bones W/ Mpr dated 4/15/2022; Head C Spine Mpr Wo Con dated 4/19/2022; Head C Spi
ne Mpr Wo Con dated 4/15/2022

 

TECHNIQUE:  Axial 5 mm thick images of the head were obtained without IV contrast.

 

All CT scans are performed using dose optimization technique as appropriate and may include automated
 exposure control or mA/KV adjustment according to patient size.

 

FINDINGS:  Subacute hemorrhage is again noted in the anterior aspect and floor of the right middle cr
anial fossa. The size of this subacute bleed has not changed since April 19 imaging. There is no incr
ease in edema or mass effect in this region. No new site of intracranial hemorrhage identified. There
 is no overall cerebral edema, significant mass effect or progressive midline shift. No cortical elroy
a or sulcal effacement. Acute infarction changes are not evident. Ventricles are normal.

 

Mastoid air cells are clear. Patchy paranasal sinus mucosal thickening seen without air-fluid level.

 

No acute bony findings.

 

 

IMPRESSION:  Right middle cranial fossa subacute hemorrhage has not changed since April 19.

 

No new hemorrhage, mass effect, edema or other significant change since April 19.

## 2022-04-22 NOTE — RAD REPORT
EXAM DESCRIPTION:  US - UPPER EXTREMITY VENOUS UNILATE - 4/22/2022 9:11 am

 

CLINICAL HISTORY:  Left arm pain and swelling

 

COMPARISON:  None.

 

TECHNIQUE:  Real-time sonographic evaluation of the left upper extremity deep venous systems was perf
ormed.

 

FINDINGS:  Normal compressibility, flow augmentation, phasic flow and spontaneous flow are identified
 in the left upper extremity deep venous system. No intraluminal filling defects seen. Internal jugul
ar and subclavian veins are normal as well.

 

The superficial left basilic vein shows thrombus filling and enlarging the lumen of the vessel extend
ing from the antecubital fossa distally to near the wrist.

 

IMPRESSION:  Superficial venous thrombosis in the left basilic vein from elbow to wrist.

 

No thrombus in the deep venous system identified.

## 2022-04-23 LAB
ALBUMIN SERPL BCP-MCNC: 2.6 G/DL (ref 3.4–5)
ALP SERPL-CCNC: 112 U/L (ref 45–117)
ALT SERPL W P-5'-P-CCNC: 126 U/L (ref 12–78)
AST SERPL W P-5'-P-CCNC: 53 U/L (ref 15–37)
BUN BLD-MCNC: 6 MG/DL (ref 7–18)
GLUCOSE SERPLBLD-MCNC: 75 MG/DL (ref 74–106)
HCT VFR BLD CALC: 35.1 % (ref 39.6–49)
LYMPHOCYTES # SPEC AUTO: 1.7 K/UL (ref 0.7–4.9)
PMV BLD: 7.7 FL (ref 7.6–11.3)
POTASSIUM SERPL-SCNC: 3.8 MMOL/L (ref 3.5–5.1)
RBC # BLD: 3.88 M/UL (ref 4.33–5.43)

## 2022-04-23 RX ADMIN — SODIUM CHLORIDE SCH MLS: 9 INJECTION, SOLUTION INTRAVENOUS at 04:20

## 2022-04-23 RX ADMIN — MORPHINE SULFATE PRN MG: 4 INJECTION, SOLUTION INTRAMUSCULAR; INTRAVENOUS at 17:42

## 2022-04-23 RX ADMIN — HYDROCORTISONE SODIUM SUCCINATE SCH MG: 100 INJECTION, POWDER, FOR SOLUTION INTRAMUSCULAR; INTRAVENOUS at 17:30

## 2022-04-23 RX ADMIN — HYDROCORTISONE SODIUM SUCCINATE SCH MG: 100 INJECTION, POWDER, FOR SOLUTION INTRAMUSCULAR; INTRAVENOUS at 17:00

## 2022-04-23 RX ADMIN — MORPHINE SULFATE PRN MG: 4 INJECTION, SOLUTION INTRAMUSCULAR; INTRAVENOUS at 06:12

## 2022-04-23 RX ADMIN — SODIUM CHLORIDE SCH MLS: 9 INJECTION, SOLUTION INTRAVENOUS at 17:38

## 2022-04-23 RX ADMIN — SODIUM CHLORIDE SCH MLS: 9 INJECTION, SOLUTION INTRAVENOUS at 09:02

## 2022-04-23 RX ADMIN — ACETAMINOPHEN PRN MG: 500 TABLET, FILM COATED ORAL at 09:04

## 2022-04-23 RX ADMIN — MORPHINE SULFATE PRN MG: 4 INJECTION, SOLUTION INTRAMUSCULAR; INTRAVENOUS at 22:09

## 2022-04-23 RX ADMIN — Medication SCH ML: at 09:00

## 2022-04-23 RX ADMIN — ENOXAPARIN SODIUM SCH MG: 40 INJECTION SUBCUTANEOUS at 17:38

## 2022-04-23 RX ADMIN — ACETAMINOPHEN PRN MG: 500 TABLET, FILM COATED ORAL at 01:20

## 2022-04-23 RX ADMIN — SODIUM CHLORIDE SCH MLS: 9 INJECTION, SOLUTION INTRAVENOUS at 00:45

## 2022-04-23 RX ADMIN — MORPHINE SULFATE PRN MG: 4 INJECTION, SOLUTION INTRAMUSCULAR; INTRAVENOUS at 01:20

## 2022-04-23 RX ADMIN — MORPHINE SULFATE PRN MG: 4 INJECTION, SOLUTION INTRAMUSCULAR; INTRAVENOUS at 11:07

## 2022-04-23 RX ADMIN — SODIUM CHLORIDE SCH MLS: 0.9 INJECTION, SOLUTION INTRAVENOUS at 04:18
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
## 2022-04-23 NOTE — P.PN
Subjective


Date of Service: 04/23/22


Subjective: No new changes, No C/O voiced, Improving





Review of Systems


10-point ROS is otherwise unremarkable





Physical Examination





- Vital Signs


Temperature: 98.2 F


Blood Pressure: 131/86


Pulse: 68


Respirations: 14


Pulse Ox (%): 98





- Physical Exam


General: Alert, In no apparent distress, Oriented x3


Respiratory: Clear to auscultation bilaterally, Normal air movement


Cardiovascular: Regular rate/rhythm, Normal S1 S2, No murmurs


Gastrointestinal: Normal bowel sounds, Soft and benign, Non-distended, No 

tenderness


Musculoskeletal: No clubbing, No swelling, No tenderness


Neurological: Sensation intact, Cranial nerves 3-12 intact





- Studies


Laboratory Data (last 24 hrs)





04/23/22 02:38: Sodium 139, Potassium 3.8, BUN 6 L, Creatinine 0.85, Glucose 75,

Total Bilirubin 0.4, AST 53 H,  H, Alkaline Phosphatase 112


04/23/22 02:38: WBC 4.8  D, Hgb 11.6 L, Hct 35.1 L, Plt Count 269





Medications List Reviewed: Yes





Assessment & Plan





- Problems (Diagnosis)


(1) Thrombophlebitis


Current Visit: Yes   Status: Acute   





(2) Cellulitis


Current Visit: Yes   Status: Acute   





(3) History of intracranial hemorrhage


Current Visit: Yes   Status: Acute   





- Plan





PLAN:


Continue plan of care as mentioned below:


1. Continue with IV antibiotic


2. Continue with local wound care


3. Monitor CBC


4. Strict blood sugar monitoring


5. Pain control


6. Steroids


7. GI and DVT prophylaxis


Discharge Plan: Home


Plan to discharge in: 48 Hours





- Advance Directives


Does patient have a Living Will: No


Does patient have a Durable POA for Healthcare: No





- Code Status/Comfort Care


Code Status: Full Code


Critical Care: No


Time Spent Managing PTS Care (In Minutes): 35

## 2022-04-24 VITALS — OXYGEN SATURATION: 100 %

## 2022-04-24 RX ADMIN — SODIUM CHLORIDE SCH MLS: 9 INJECTION, SOLUTION INTRAVENOUS at 02:11

## 2022-04-24 RX ADMIN — MORPHINE SULFATE PRN MG: 4 INJECTION, SOLUTION INTRAMUSCULAR; INTRAVENOUS at 05:20

## 2022-04-24 RX ADMIN — SODIUM CHLORIDE SCH MLS: 9 INJECTION, SOLUTION INTRAVENOUS at 16:30

## 2022-04-24 RX ADMIN — Medication SCH ML: at 08:44

## 2022-04-24 RX ADMIN — HYDROCORTISONE SODIUM SUCCINATE SCH MG: 100 INJECTION, POWDER, FOR SOLUTION INTRAMUSCULAR; INTRAVENOUS at 10:05

## 2022-04-24 RX ADMIN — HYDROCORTISONE SODIUM SUCCINATE SCH MG: 100 INJECTION, POWDER, FOR SOLUTION INTRAMUSCULAR; INTRAVENOUS at 02:10

## 2022-04-24 RX ADMIN — Medication SCH ML: at 02:15

## 2022-04-24 RX ADMIN — MORPHINE SULFATE PRN MG: 4 INJECTION, SOLUTION INTRAMUSCULAR; INTRAVENOUS at 10:05

## 2022-04-24 RX ADMIN — SODIUM CHLORIDE SCH MLS: 9 INJECTION, SOLUTION INTRAVENOUS at 07:33

## 2022-04-24 RX ADMIN — SODIUM CHLORIDE SCH MLS: 9 INJECTION, SOLUTION INTRAVENOUS at 08:43

## 2022-04-24 RX ADMIN — ACETAMINOPHEN PRN MG: 500 TABLET, FILM COATED ORAL at 08:44

## 2022-04-24 RX ADMIN — ENOXAPARIN SODIUM SCH MG: 40 INJECTION SUBCUTANEOUS at 16:32

## 2022-04-24 RX ADMIN — MORPHINE SULFATE PRN MG: 4 INJECTION, SOLUTION INTRAMUSCULAR; INTRAVENOUS at 14:25

## 2022-04-24 RX ADMIN — HYDROCORTISONE SODIUM SUCCINATE SCH MG: 100 INJECTION, POWDER, FOR SOLUTION INTRAMUSCULAR; INTRAVENOUS at 16:44

## 2022-04-24 RX ADMIN — Medication SCH ML: at 20:33

## 2022-04-24 NOTE — RAD REPORT
EXAM DESCRIPTION:  US - UPPER EXTREMITY VENOUS UNILATE - 4/24/2022 4:44 pm

 

CLINICAL HISTORY:  Left arm pain and swelling

 

COMPARISON:  None.

 

TECHNIQUE:  Real-time sonographic evaluation of the left upper extremity deep venous systems was perf
ormed.

 

FINDINGS:  Normal compressibility, flow augmentation, phasic flow and spontaneous flow are identified
 in the left upper extremity deep venous system. No intraluminal filling defects seen. Internal jugul
ar and subclavian veins are normal as well.

 

Echogenic thrombus is present filling and distending the superficial basilic vein from the elbow join
t to near the wrist joint. This is similar to the April 19 imaging with no progression or propagation
 of the superficial venous thrombosis.

 

IMPRESSION:  No DVT in the left upper extremity.

 

Basilic vein superficial venous thrombosis showing no identifiable change from the recent imaging.
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
## 2022-04-25 RX ADMIN — HYDROCORTISONE SODIUM SUCCINATE SCH MG: 100 INJECTION, POWDER, FOR SOLUTION INTRAMUSCULAR; INTRAVENOUS at 09:42

## 2022-04-25 RX ADMIN — ASPIRIN SCH MG: 81 TABLET, COATED ORAL at 09:42

## 2022-04-25 RX ADMIN — SODIUM CHLORIDE SCH MLS: 9 INJECTION, SOLUTION INTRAVENOUS at 09:41

## 2022-04-25 RX ADMIN — SODIUM CHLORIDE SCH MLS: 9 INJECTION, SOLUTION INTRAVENOUS at 15:51

## 2022-04-25 RX ADMIN — NICOTINE SCH MG: 21 PATCH TRANSDERMAL at 18:07

## 2022-04-25 RX ADMIN — MORPHINE SULFATE PRN MG: 4 INJECTION, SOLUTION INTRAMUSCULAR; INTRAVENOUS at 15:50

## 2022-04-25 RX ADMIN — HYDROCORTISONE SODIUM SUCCINATE SCH MG: 100 INJECTION, POWDER, FOR SOLUTION INTRAMUSCULAR; INTRAVENOUS at 15:53

## 2022-04-25 RX ADMIN — MORPHINE SULFATE PRN MG: 4 INJECTION, SOLUTION INTRAMUSCULAR; INTRAVENOUS at 05:59

## 2022-04-25 RX ADMIN — SODIUM CHLORIDE SCH MLS: 9 INJECTION, SOLUTION INTRAVENOUS at 01:04

## 2022-04-25 RX ADMIN — MORPHINE SULFATE PRN MG: 4 INJECTION, SOLUTION INTRAMUSCULAR; INTRAVENOUS at 10:55

## 2022-04-25 RX ADMIN — ENOXAPARIN SODIUM SCH MG: 40 INJECTION SUBCUTANEOUS at 15:52

## 2022-04-25 RX ADMIN — MORPHINE SULFATE PRN MG: 4 INJECTION, SOLUTION INTRAMUSCULAR; INTRAVENOUS at 01:03

## 2022-04-25 RX ADMIN — Medication SCH ML: at 21:49

## 2022-04-25 RX ADMIN — Medication SCH ML: at 09:00

## 2022-04-25 RX ADMIN — HYDROCORTISONE SODIUM SUCCINATE SCH MG: 100 INJECTION, POWDER, FOR SOLUTION INTRAMUSCULAR; INTRAVENOUS at 01:03

## 2022-04-25 RX ADMIN — MORPHINE SULFATE PRN MG: 4 INJECTION, SOLUTION INTRAMUSCULAR; INTRAVENOUS at 21:19

## 2022-04-25 RX ADMIN — SODIUM CHLORIDE SCH MLS: 9 INJECTION, SOLUTION INTRAVENOUS at 18:07

## 2022-04-25 RX ADMIN — SODIUM CHLORIDE SCH MLS: 9 INJECTION, SOLUTION INTRAVENOUS at 05:58

## 2022-04-25 RX ADMIN — SODIUM CHLORIDE SCH: 9 INJECTION, SOLUTION INTRAVENOUS at 17:00

## 2022-04-25 NOTE — P.PN
Subjective


Date of Service: 04/25/22


Chief Complaint: Left upper extremity cellulitis


Subjective: No new changes





Physical Examination





- Vital Signs


Temperature: 97.8 F


Blood Pressure: 127/67


Pulse: 67


Respirations: 16


Pulse Ox (%): 99





- Studies


Medications List Reviewed: Yes





Assessment And Plan


Physician Review: Patient Assessed, Agree with Above Assessment and Plan


Physician Review Additional Text: 





04/25/22 14:30





- Physical Exam


General: Alert, In no apparent distress, Oriented x3


Respiratory: Clear to auscultation bilaterally, Normal air movement


Cardiovascular: Regular rate/rhythm, Normal S1 S2, No murmurs


Gastrointestinal: Normal bowel sounds, Soft and benign, Non-distended, No 

tenderness


Musculoskeletal: No clubbing, No swelling, No tenderness


Neurological: Sensation intact, Cranial nerves 3-12 intact








Assessment & Plan





- Problems (Diagnosis)


(1) Thrombophlebitis


Current Visit: Yes   Status: Acute   





(2) Cellulitis


Current Visit: Yes   Status: Acute   





(3) History of intracranial hemorrhage


Current Visit: Yes   Status: Acute   





- Plan





Pain symptoms


Continue current pain management regimen


Continue antibiotics and local wound care


LFTs trended down, prior history of alcohol use, continue to follow


If further improvement in the Lfts in am,  may be considered plan for discharge


Otherwise may need GI evaluation 


4. Strict blood sugar monitoring


5. Pain control





7. GI and DVT prophylaxis

## 2022-04-26 VITALS — SYSTOLIC BLOOD PRESSURE: 134 MMHG | TEMPERATURE: 97.9 F | DIASTOLIC BLOOD PRESSURE: 82 MMHG

## 2022-04-26 LAB
ALBUMIN SERPL BCP-MCNC: 2.8 G/DL (ref 3.4–5)
ALP SERPL-CCNC: 92 U/L (ref 45–117)
ALT SERPL W P-5'-P-CCNC: 78 U/L (ref 12–78)
AST SERPL W P-5'-P-CCNC: 25 U/L (ref 15–37)
BUN BLD-MCNC: 12 MG/DL (ref 7–18)
GLUCOSE SERPLBLD-MCNC: 120 MG/DL (ref 74–106)
POTASSIUM SERPL-SCNC: 3.7 MMOL/L (ref 3.5–5.1)

## 2022-04-26 RX ADMIN — Medication SCH ML: at 08:31

## 2022-04-26 RX ADMIN — HYDROCORTISONE SODIUM SUCCINATE SCH MG: 100 INJECTION, POWDER, FOR SOLUTION INTRAMUSCULAR; INTRAVENOUS at 08:30

## 2022-04-26 RX ADMIN — SODIUM CHLORIDE SCH MLS: 9 INJECTION, SOLUTION INTRAVENOUS at 00:28

## 2022-04-26 RX ADMIN — SODIUM CHLORIDE SCH MLS: 9 INJECTION, SOLUTION INTRAVENOUS at 08:30

## 2022-04-26 RX ADMIN — MORPHINE SULFATE PRN MG: 4 INJECTION, SOLUTION INTRAMUSCULAR; INTRAVENOUS at 04:15

## 2022-04-26 RX ADMIN — NICOTINE SCH MG: 21 PATCH TRANSDERMAL at 08:29

## 2022-04-26 RX ADMIN — HYDROCORTISONE SODIUM SUCCINATE SCH MG: 100 INJECTION, POWDER, FOR SOLUTION INTRAMUSCULAR; INTRAVENOUS at 00:27

## 2022-04-26 RX ADMIN — ASPIRIN SCH MG: 81 TABLET, COATED ORAL at 08:29

## 2022-04-26 RX ADMIN — SODIUM CHLORIDE SCH MLS: 9 INJECTION, SOLUTION INTRAVENOUS at 05:58

## 2022-04-26 RX ADMIN — MORPHINE SULFATE PRN MG: 4 INJECTION, SOLUTION INTRAMUSCULAR; INTRAVENOUS at 08:30
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
